# Patient Record
Sex: FEMALE | Race: WHITE | NOT HISPANIC OR LATINO | Employment: OTHER | URBAN - NONMETROPOLITAN AREA
[De-identification: names, ages, dates, MRNs, and addresses within clinical notes are randomized per-mention and may not be internally consistent; named-entity substitution may affect disease eponyms.]

---

## 2017-02-17 ENCOUNTER — APPOINTMENT (OUTPATIENT)
Dept: GENERAL RADIOLOGY | Facility: HOSPITAL | Age: 82
End: 2017-02-17

## 2017-02-17 ENCOUNTER — APPOINTMENT (OUTPATIENT)
Dept: CT IMAGING | Facility: HOSPITAL | Age: 82
End: 2017-02-17

## 2017-02-17 ENCOUNTER — HOSPITAL ENCOUNTER (EMERGENCY)
Facility: HOSPITAL | Age: 82
Discharge: HOME | End: 2017-02-17
Attending: EMERGENCY MEDICINE | Admitting: EMERGENCY MEDICINE

## 2017-02-17 VITALS
OXYGEN SATURATION: 99 % | SYSTOLIC BLOOD PRESSURE: 123 MMHG | BODY MASS INDEX: 25.52 KG/M2 | HEIGHT: 63 IN | WEIGHT: 144 LBS | RESPIRATION RATE: 17 BRPM | DIASTOLIC BLOOD PRESSURE: 47 MMHG | TEMPERATURE: 97.9 F | HEART RATE: 60 BPM

## 2017-02-17 DIAGNOSIS — R55 VASO-VAGAL REACTION: Primary | ICD-10-CM

## 2017-02-17 LAB
ALBUMIN SERPL-MCNC: 4.1 G/DL (ref 3.5–5)
ALBUMIN/GLOB SERPL: 1.6 G/DL (ref 1.1–2.5)
ALP SERPL-CCNC: 71 U/L (ref 24–120)
ALT SERPL W P-5'-P-CCNC: 34 U/L (ref 0–54)
ANION GAP SERPL CALCULATED.3IONS-SCNC: 10 MMOL/L (ref 4–13)
AST SERPL-CCNC: 31 U/L (ref 7–45)
BASOPHILS # BLD AUTO: 0.02 10*3/MM3 (ref 0–0.2)
BASOPHILS NFR BLD AUTO: 0.3 % (ref 0–2)
BILIRUB SERPL-MCNC: 0.8 MG/DL (ref 0.1–1)
BUN BLD-MCNC: 19 MG/DL (ref 5–21)
BUN/CREAT SERPL: 23.8 (ref 7–25)
CALCIUM SPEC-SCNC: 9.6 MG/DL (ref 8.4–10.4)
CHLORIDE SERPL-SCNC: 104 MMOL/L (ref 98–110)
CO2 SERPL-SCNC: 27 MMOL/L (ref 24–31)
CREAT BLD-MCNC: 0.8 MG/DL (ref 0.5–1.4)
DEPRECATED RDW RBC AUTO: 46.1 FL (ref 40–54)
EOSINOPHIL # BLD AUTO: 0.03 10*3/MM3 (ref 0–0.7)
EOSINOPHIL NFR BLD AUTO: 0.5 % (ref 0–4)
ERYTHROCYTE [DISTWIDTH] IN BLOOD BY AUTOMATED COUNT: 13.8 % (ref 12–15)
GFR SERPL CREATININE-BSD FRML MDRD: 67 ML/MIN/1.73
GLOBULIN UR ELPH-MCNC: 2.5 GM/DL
GLUCOSE BLD-MCNC: 133 MG/DL (ref 70–100)
GLUCOSE BLDC GLUCOMTR-MCNC: 135 MG/DL (ref 70–130)
HCT VFR BLD AUTO: 37.5 % (ref 37–47)
HGB BLD-MCNC: 12 G/DL (ref 12–16)
IMM GRANULOCYTES # BLD: 0.02 10*3/MM3 (ref 0–0.03)
IMM GRANULOCYTES NFR BLD: 0.3 % (ref 0–5)
LYMPHOCYTES # BLD AUTO: 1.67 10*3/MM3 (ref 0.72–4.86)
LYMPHOCYTES NFR BLD AUTO: 25.8 % (ref 15–45)
MCH RBC QN AUTO: 29.4 PG (ref 28–32)
MCHC RBC AUTO-ENTMCNC: 32 G/DL (ref 33–36)
MCV RBC AUTO: 91.9 FL (ref 82–98)
MONOCYTES # BLD AUTO: 0.34 10*3/MM3 (ref 0.19–1.3)
MONOCYTES NFR BLD AUTO: 5.3 % (ref 4–12)
NEUTROPHILS # BLD AUTO: 4.39 10*3/MM3 (ref 1.87–8.4)
NEUTROPHILS NFR BLD AUTO: 67.8 % (ref 39–78)
PLATELET # BLD AUTO: 274 10*3/MM3 (ref 130–400)
PMV BLD AUTO: 9.9 FL (ref 6–12)
POTASSIUM BLD-SCNC: 4.5 MMOL/L (ref 3.5–5.3)
PROT SERPL-MCNC: 6.6 G/DL (ref 6.3–8.7)
RBC # BLD AUTO: 4.08 10*6/MM3 (ref 4.2–5.4)
SODIUM BLD-SCNC: 141 MMOL/L (ref 135–145)
TROPONIN I SERPL-MCNC: 0 NG/ML (ref 0–0.07)
WBC NRBC COR # BLD: 6.47 10*3/MM3 (ref 4.8–10.8)

## 2017-02-17 PROCEDURE — 71010 HC CHEST PA OR AP: CPT

## 2017-02-17 PROCEDURE — 96360 HYDRATION IV INFUSION INIT: CPT

## 2017-02-17 PROCEDURE — 82962 GLUCOSE BLOOD TEST: CPT

## 2017-02-17 PROCEDURE — 70450 CT HEAD/BRAIN W/O DYE: CPT

## 2017-02-17 PROCEDURE — 99283 EMERGENCY DEPT VISIT LOW MDM: CPT

## 2017-02-17 PROCEDURE — 80053 COMPREHEN METABOLIC PANEL: CPT | Performed by: EMERGENCY MEDICINE

## 2017-02-17 PROCEDURE — 84484 ASSAY OF TROPONIN QUANT: CPT

## 2017-02-17 PROCEDURE — 93005 ELECTROCARDIOGRAM TRACING: CPT | Performed by: EMERGENCY MEDICINE

## 2017-02-17 PROCEDURE — 85025 COMPLETE CBC W/AUTO DIFF WBC: CPT | Performed by: EMERGENCY MEDICINE

## 2017-02-17 PROCEDURE — 96361 HYDRATE IV INFUSION ADD-ON: CPT

## 2017-02-17 PROCEDURE — 93010 ELECTROCARDIOGRAM REPORT: CPT | Performed by: INTERNAL MEDICINE

## 2017-02-17 RX ADMIN — SODIUM CHLORIDE 500 ML: 0.9 INJECTION, SOLUTION INTRAVENOUS at 08:26

## 2017-02-17 NOTE — ED PROVIDER NOTES
Subjective   Patient is a 89 y.o. female presenting with syncope.   Syncope   Episode history:  Single  Most recent episode:  Today  Timing:  Constant  Progression:  Resolved  Chronicity:  New  Context: not blood draw, not bowel movement, not dehydration, not exertion, not inactivity, not medication change, not with normal activity, not sight of blood, not sitting down, not standing up and not urination    Witnessed: yes    Worsened by:  Nothing  Associated symptoms: no anxiety, no chest pain, no difficulty breathing, no dizziness, no fever, no headaches, no malaise/fatigue, no nausea, no palpitations, no recent fall, no recent injury, no recent surgery, no rectal bleeding, no seizures, no shortness of breath, no visual change, no vomiting and no weakness    Risk factors: no congenital heart disease        Review of Systems   Constitutional: Negative.  Negative for fever and malaise/fatigue.   HENT: Negative.    Eyes: Negative.    Respiratory: Negative.  Negative for shortness of breath.    Cardiovascular: Positive for syncope. Negative for chest pain and palpitations.   Gastrointestinal: Negative.  Negative for nausea and vomiting.   Musculoskeletal: Negative.  Negative for back pain and neck pain.   Skin: Negative.    Neurological: Negative for dizziness, seizures, weakness and headaches.   All other systems reviewed and are negative.      Past Medical History   Diagnosis Date   • Bradycardia    • CAD in native artery    • Cardiac device in situ 03/29/2011     RCA stent   • Chest pain    • Diabetes mellitus type 2, uncomplicated    • GERD (gastroesophageal reflux disease)    • Hard of hearing    • History of myocardial infarction 03/29/2011     EF 45% 2/11 cath   • Hyperlipemia, mixed    • Hypertension, benign    • Palpitation    • SSS (sick sinus syndrome)    • Status cardiac pacemaker    • Syncope        No Known Allergies    Past Surgical History   Procedure Laterality Date   • Hysterectomy       total   •  Pacemaker implantation     • Bladder suspension       with hysterectomy       Family History   Problem Relation Age of Onset   • Coronary artery disease Mother        Social History     Social History   • Marital status:      Spouse name: N/A   • Number of children: N/A   • Years of education: N/A     Social History Main Topics   • Smoking status: Never Smoker   • Smokeless tobacco: None   • Alcohol use No   • Drug use: None   • Sexual activity: Not Asked     Other Topics Concern   • None     Social History Narrative           Objective   Physical Exam   Constitutional: She is oriented to person, place, and time. She appears well-developed and well-nourished.   HENT:   Head: Normocephalic and atraumatic.   Eyes: Conjunctivae and EOM are normal. Pupils are equal, round, and reactive to light.   Neck: Normal range of motion. Neck supple.   Cardiovascular: Normal rate, regular rhythm, normal heart sounds and intact distal pulses.    Pulmonary/Chest: Effort normal and breath sounds normal.   Abdominal: Soft. Bowel sounds are normal.   Musculoskeletal: Normal range of motion.   Neurological: She is alert and oriented to person, place, and time. She has normal reflexes.   Skin: Skin is warm and dry.   Psychiatric: She has a normal mood and affect.   Nursing note and vitals reviewed.      Procedures         ED Course  ED Course   Comment By Time   nsr Robinson Michelle MD 02/17 0802   Case discussed extensively with the patient Robinson Michelle MD 02/17 1002    The patient's symptoms are now better.  Patient is not having pain.  No chest pain, difficulty breathing, nausea, vomiting or palpitations.  No anxiety or dizziness.  Vital signs have been reviewed and appear to be correct.  Physical exam findings are improved.  Alert.  Oriented X3 .  No acute distress.  Breath sounds normal.  No respiratory distress, decreased breath sounds or wheezes.  Normal heart rate and rhythm.  Heart sounds normal.  .  Abdomen soft and  nontender.  No abdominal tenderness or guarding or rebound tenderness.  Skin warm and dry.  No cyanosis or diaphoresis.  Oriented X 3.  Not anxious.  No alteration in mental status or weakness.         Robinson Michelle MD 02/17 1002   Neck: The patient be admitted to the hospital but they want to go home we will discharge home with a follow-up Holter and try and 2-D echo Robinson Michelle MD 02/17 1002                  [facility]    Final diagnoses:   Vaso-vagal reaction            Robinson Michelle MD  02/18/17 2025

## 2017-02-17 NOTE — ED NOTES
"Patient c/o of near syncope. Patient states she was standing in the kitchen this morning cooking breakfast and a \"strange sensation\" came over her, and she had to sit down. Patient denies pain. Patient denies shortness of breath. Patient denies dizziness. Patient states reports episode lasted about fifteen minutes. Patient states she has not yet eaten this morning. Patient states she has not taken any medications this morning. Patient states she was treated for a double ear infection last week. Patient also report hypertension at home.     Jessie Mcgee RN  02/17/17 0736       Jessie Mcgee RN  02/17/17 0737    "

## 2017-03-08 ENCOUNTER — OFFICE VISIT (OUTPATIENT)
Dept: CARDIOLOGY | Facility: CLINIC | Age: 82
End: 2017-03-08

## 2017-03-08 ENCOUNTER — CLINICAL SUPPORT (OUTPATIENT)
Dept: CARDIOLOGY | Facility: CLINIC | Age: 82
End: 2017-03-08

## 2017-03-08 VITALS
BODY MASS INDEX: 23.82 KG/M2 | HEIGHT: 65 IN | DIASTOLIC BLOOD PRESSURE: 65 MMHG | SYSTOLIC BLOOD PRESSURE: 140 MMHG | WEIGHT: 143 LBS | HEART RATE: 82 BPM

## 2017-03-08 DIAGNOSIS — I49.5 SSS (SICK SINUS SYNDROME) (HCC): ICD-10-CM

## 2017-03-08 DIAGNOSIS — I25.10 CORONARY ARTERY DISEASE INVOLVING NATIVE CORONARY ARTERY OF NATIVE HEART WITHOUT ANGINA PECTORIS: Primary | ICD-10-CM

## 2017-03-08 DIAGNOSIS — Z95.0 CARDIAC PACEMAKER IN SITU: Primary | ICD-10-CM

## 2017-03-08 DIAGNOSIS — R07.2 PRECORDIAL PAIN: ICD-10-CM

## 2017-03-08 DIAGNOSIS — K21.9 GASTROESOPHAGEAL REFLUX DISEASE WITHOUT ESOPHAGITIS: ICD-10-CM

## 2017-03-08 DIAGNOSIS — E78.2 MIXED HYPERLIPIDEMIA: ICD-10-CM

## 2017-03-08 PROCEDURE — 93280 PM DEVICE PROGR EVAL DUAL: CPT | Performed by: INTERNAL MEDICINE

## 2017-03-08 PROCEDURE — 93000 ELECTROCARDIOGRAM COMPLETE: CPT | Performed by: INTERNAL MEDICINE

## 2017-03-08 PROCEDURE — 99214 OFFICE O/P EST MOD 30 MIN: CPT | Performed by: INTERNAL MEDICINE

## 2017-03-08 NOTE — PROGRESS NOTES
Dual Chamber Pacemaker Evaluation Report  In office    March 8, 2017    Primary Cardiologist: Kaylen  : [employer] Model: Revo MRI  Implant date: January 21, 2013    Reason for evaluation:routine  Indication for pacemaker: sick sinus syndrome    Measurements  Atrial sensing - P wave: 5.1 mV  Atrial threshold: 0.5V@ 0.4ms  Atrial lead impedance: 456 ohms  Ventricular sensing - R wave: 10.5 mV  Ventricular threshold: 1 V @ 0.4 ms  Ventricular lead impedance:   440 ohms     Diagnostic Data  Atrial paced: 27.9 %  Ventricular paced: <0.2 %  Other: 2 SVT/ a-flutter episodes- both 8-10 seconds each, max v rate 182 bpm, pt believes this may have been around the time she felt the symptoms she mentioned to Dr. Abdullahi   Notified Dr. Abdullahi of results.  Battery status: satisfactory  3 V      Final Parameters  Mode:  AAIR+  Lower rate: 60 bpm   Upper rate: 130 bpm  AV Delay: paced- 180 ms  Sensed-150 ms  Atrial - Amplitude: 1.5 V   Pulse width: 0.4 ms   Sensitivity: 0.3 mV     Ventricular - Amplitude: 2 V  Pulse width: 0.4 ms  Sensitivity: 0.9 mV    Changes made: changed atrial output to 1.5V  Conclusions: normal pacemaker function and stable pacing and sensing thresholds    Follow up: 6 months

## 2017-03-08 NOTE — PROGRESS NOTES
Emiliano Recinos  [id number]  2/25/1923  89 y.o.  female    Referring Provider: Florentin Costello MD    Reason for Follow-up Visit: Dizziness Recent increasing dyspnea on exzertion    Overall doing well  Intermittent  chest pain mostly epigastric  Occurs mostly with exertion  Moderateshortness of breath  Compliant with medications    History of present illness:  Emiliano Recinos is a 89 y.o. yo female with history of dizziness who presents today for   Chief Complaint   Patient presents with   • Coronary Artery Disease     6 mon fu    • Dizziness   .    History  Past Medical History   Diagnosis Date   • Bradycardia    • CAD in native artery    • Cardiac device in situ 03/29/2011     RCA stent   • Chest pain    • Diabetes mellitus type 2, uncomplicated    • GERD (gastroesophageal reflux disease)    • Hard of hearing    • History of myocardial infarction 03/29/2011     EF 45% 2/11 cath   • Hyperlipemia, mixed    • Hypertension, benign    • Palpitation    • SSS (sick sinus syndrome)    • Status cardiac pacemaker    • Syncope    ,   Past Surgical History   Procedure Laterality Date   • Hysterectomy       total   • Pacemaker implantation     • Bladder suspension       with hysterectomy   ,   Family History   Problem Relation Age of Onset   • Coronary artery disease Mother    • Heart attack Mother    ,   Social History   Substance Use Topics   • Smoking status: Never Smoker   • Smokeless tobacco: Never Used   • Alcohol use No   ,     Medications  Current Outpatient Prescriptions   Medication Sig Dispense Refill   • aspirin 81 MG tablet Take 81 mg by mouth daily.     • clopidogrel (PLAVIX) 75 MG tablet Take 75 mg by mouth daily.     • furosemide (LASIX) 20 MG tablet Take  by mouth as needed.     • lisinopril (PRINIVIL,ZESTRIL) 5 MG tablet Take 2.5 mg by mouth daily.     • MULTIPLE VITAMIN PO Take  by mouth.       • nitroglycerin (NITROSTAT) 0.4 MG SL tablet Place 0.4 mg under the tongue daily.     • omeprazole (PriLOSEC)  "20 MG capsule Take 20 mg by mouth daily.     • rosuvastatin (CRESTOR) 10 MG tablet Take 10 mg by mouth. Monday, Wed, Fri       No current facility-administered medications for this visit.        Allergies:  Review of patient's allergies indicates no known allergies.    Review of Systems  Review of Systems   Constitution: Negative.   HENT: Negative.    Eyes: Negative.    Cardiovascular: Positive for chest pain, dyspnea on exertion and near-syncope. Negative for claudication, cyanosis, irregular heartbeat, leg swelling, orthopnea, palpitations, paroxysmal nocturnal dyspnea and syncope.   Respiratory: Negative.    Endocrine: Negative.    Hematologic/Lymphatic: Negative.    Skin: Negative.    Gastrointestinal: Negative for anorexia.   Genitourinary: Negative.    Neurological: Negative.    Psychiatric/Behavioral: Negative.        Objective     Physical Exam:  Visit Vitals   • /65   • Pulse 82   • Ht 65\" (165.1 cm)   • Wt 143 lb (64.9 kg)   • BMI 23.8 kg/m2     Physical Exam   Constitutional: She appears well-developed.   HENT:   Head: Normocephalic.   Neck: Normal carotid pulses and no JVD present. No tracheal tenderness present. Carotid bruit is not present. No tracheal deviation and no edema present.   Cardiovascular: Regular rhythm and normal pulses.    Murmur heard.   Systolic murmur is present with a grade of 2/6   Pulmonary/Chest: Effort normal. No stridor.   Abdominal: Soft.   Neurological: She is alert. She has normal strength. No cranial nerve deficit or sensory deficit.   Skin: Skin is warm.   Psychiatric: She has a normal mood and affect. Her speech is normal and behavior is normal.       Results Review:       ECG 12 Lead  Date/Time: 3/8/2017 10:37 AM  Performed by: JENNIFER RALPH  Authorized by: JENNIFER RALPH   Comparison: compared with previous ECG from 2/17/2017  Similar to previous ECG  Rhythm: sinus rhythm  Rate: normal  T Waves: T waves normal  QRS axis: normal  Clinical impression: normal " ECG            Assessment/Plan   Patient Active Problem List   Diagnosis   • CAD (coronary artery disease)   • SSS (sick sinus syndrome)   • GERD (gastroesophageal reflux disease)   • HLD (hyperlipidemia)       No palpitations. No significant pedal edema. Compliant with medications and diet. Latest labs and medications reviewed.    Plan:  Will recheck pacer as recent dizziness  Close follow up with you as scheduled.  Intensive factor modifications.  See order list.    Counseled regarding disease appropriate diet, fluid, caffeine, stimulants and sodium intake as well as importance of compliance to diet, exercise and regular follow up.  Avoid NSAIDS and COX2 inhibitors. Use Acetaminophen PRN.    Return in about 4 weeks (around 4/5/2017).

## 2017-03-13 NOTE — PROGRESS NOTES
I have reviewed the notes, assessments, and/or procedures performed by Griselda Garsia, I concur with her documentation of  Emiliano Recinos.

## 2017-03-29 ENCOUNTER — HOSPITAL ENCOUNTER (OUTPATIENT)
Dept: CARDIOLOGY | Facility: HOSPITAL | Age: 82
Discharge: HOME | End: 2017-03-29
Attending: INTERNAL MEDICINE | Admitting: INTERNAL MEDICINE

## 2017-03-29 ENCOUNTER — HOSPITAL ENCOUNTER (OUTPATIENT)
Dept: CARDIOLOGY | Facility: HOSPITAL | Age: 82
Discharge: HOME | End: 2017-03-29
Attending: INTERNAL MEDICINE

## 2017-03-29 VITALS
HEIGHT: 65 IN | SYSTOLIC BLOOD PRESSURE: 152 MMHG | BODY MASS INDEX: 23.82 KG/M2 | WEIGHT: 143 LBS | DIASTOLIC BLOOD PRESSURE: 64 MMHG

## 2017-03-29 VITALS — SYSTOLIC BLOOD PRESSURE: 117 MMHG | DIASTOLIC BLOOD PRESSURE: 63 MMHG | HEART RATE: 61 BPM

## 2017-03-29 DIAGNOSIS — R07.2 PRECORDIAL PAIN: ICD-10-CM

## 2017-03-29 LAB
BH CV NUCLEAR PRIOR STUDY: 2
BH CV STRESS BP STAGE 1: NORMAL
BH CV STRESS COMMENTS STAGE 1: NORMAL
BH CV STRESS DOSE REGADENOSON STAGE 1: 0.4
BH CV STRESS DURATION MIN STAGE 1: 0
BH CV STRESS DURATION SEC STAGE 1: 10
BH CV STRESS HR STAGE 1: 60
BH CV STRESS PROTOCOL 1: NORMAL
BH CV STRESS RECOVERY BP: NORMAL MMHG
BH CV STRESS RECOVERY HR: 76 BPM
BH CV STRESS STAGE 1: 1
MAXIMAL PREDICTED HEART RATE: 126 BPM
PERCENT MAX PREDICTED HR: 47.62 %
STRESS BASELINE BP: NORMAL MMHG
STRESS BASELINE HR: 61 BPM
STRESS PERCENT HR: 56 %
STRESS POST EXERCISE DUR SEC: 10 SEC
STRESS POST PEAK BP: NORMAL MMHG
STRESS POST PEAK HR: 60 BPM
STRESS TARGET HR: 107 BPM

## 2017-03-29 PROCEDURE — A9500 TC99M SESTAMIBI: HCPCS | Performed by: INTERNAL MEDICINE

## 2017-03-29 PROCEDURE — 93018 CV STRESS TEST I&R ONLY: CPT | Performed by: INTERNAL MEDICINE

## 2017-03-29 PROCEDURE — 93306 TTE W/DOPPLER COMPLETE: CPT

## 2017-03-29 PROCEDURE — 25010000002 REGADENOSON 0.4 MG/5ML SOLUTION: Performed by: INTERNAL MEDICINE

## 2017-03-29 PROCEDURE — 78452 HT MUSCLE IMAGE SPECT MULT: CPT | Performed by: INTERNAL MEDICINE

## 2017-03-29 PROCEDURE — 0 TECHNETIUM SESTAMIBI: Performed by: INTERNAL MEDICINE

## 2017-03-29 PROCEDURE — 78452 HT MUSCLE IMAGE SPECT MULT: CPT

## 2017-03-29 PROCEDURE — 93017 CV STRESS TEST TRACING ONLY: CPT

## 2017-03-29 PROCEDURE — 93306 TTE W/DOPPLER COMPLETE: CPT | Performed by: INTERNAL MEDICINE

## 2017-03-29 RX ADMIN — Medication 1 DOSE: at 09:06

## 2017-03-29 RX ADMIN — REGADENOSON 0.4 MG: 0.08 INJECTION, SOLUTION INTRAVENOUS at 08:49

## 2017-03-29 RX ADMIN — Medication 1 DOSE: at 07:28

## 2017-04-01 LAB
BH CV ECHO MEAS - AO MAX PG (FULL): 5 MMHG
BH CV ECHO MEAS - AO MAX PG: 9.1 MMHG
BH CV ECHO MEAS - AO MEAN PG (FULL): 3 MMHG
BH CV ECHO MEAS - AO MEAN PG: 6 MMHG
BH CV ECHO MEAS - AO ROOT AREA (BSA CORRECTED): 1.7
BH CV ECHO MEAS - AO ROOT AREA: 6.6 CM^2
BH CV ECHO MEAS - AO ROOT DIAM: 2.9 CM
BH CV ECHO MEAS - AO V2 MAX: 151 CM/SEC
BH CV ECHO MEAS - AO V2 MEAN: 118 CM/SEC
BH CV ECHO MEAS - AO V2 VTI: 46.9 CM
BH CV ECHO MEAS - AVA(I,A): 1.7 CM^2
BH CV ECHO MEAS - AVA(I,D): 1.7 CM^2
BH CV ECHO MEAS - AVA(V,A): 1.9 CM^2
BH CV ECHO MEAS - AVA(V,D): 1.9 CM^2
BH CV ECHO MEAS - BSA(HAYCOCK): 1.7 M^2
BH CV ECHO MEAS - BSA: 1.7 M^2
BH CV ECHO MEAS - BZI_BMI: 23.8 KILOGRAMS/M^2
BH CV ECHO MEAS - BZI_METRIC_HEIGHT: 165.1 CM
BH CV ECHO MEAS - BZI_METRIC_WEIGHT: 64.9 KG
BH CV ECHO MEAS - CONTRAST EF 4CH: 59.5 ML/M^2
BH CV ECHO MEAS - EDV(CUBED): 42.9 ML
BH CV ECHO MEAS - EDV(MOD-SP4): 52.8 ML
BH CV ECHO MEAS - EDV(TEICH): 50.9 ML
BH CV ECHO MEAS - EF(CUBED): 67.8 %
BH CV ECHO MEAS - EF(MOD-SP4): 59.5 %
BH CV ECHO MEAS - EF(TEICH): 60.4 %
BH CV ECHO MEAS - ESV(CUBED): 13.8 ML
BH CV ECHO MEAS - ESV(MOD-SP4): 21.4 ML
BH CV ECHO MEAS - ESV(TEICH): 20.2 ML
BH CV ECHO MEAS - FS: 31.4 %
BH CV ECHO MEAS - IVS/LVPW: 0.94
BH CV ECHO MEAS - IVSD: 1.6 CM
BH CV ECHO MEAS - LA DIMENSION: 3.7 CM
BH CV ECHO MEAS - LA/AO: 1.3
BH CV ECHO MEAS - LV DIASTOLIC VOL/BSA (35-75): 30.8 ML/M^2
BH CV ECHO MEAS - LV MASS(C)D: 226.5 GRAMS
BH CV ECHO MEAS - LV MASS(C)DI: 132.1 GRAMS/M^2
BH CV ECHO MEAS - LV MAX PG: 4.1 MMHG
BH CV ECHO MEAS - LV MEAN PG: 3 MMHG
BH CV ECHO MEAS - LV SYSTOLIC VOL/BSA (12-30): 12.5 ML/M^2
BH CV ECHO MEAS - LV V1 MAX: 101 CM/SEC
BH CV ECHO MEAS - LV V1 MEAN: 74.3 CM/SEC
BH CV ECHO MEAS - LV V1 VTI: 27.8 CM
BH CV ECHO MEAS - LVIDD: 3.5 CM
BH CV ECHO MEAS - LVIDS: 2.4 CM
BH CV ECHO MEAS - LVLD AP4: 6.7 CM
BH CV ECHO MEAS - LVLS AP4: 6.3 CM
BH CV ECHO MEAS - LVOT AREA (M): 2.8 CM^2
BH CV ECHO MEAS - LVOT AREA: 2.8 CM^2
BH CV ECHO MEAS - LVOT DIAM: 1.9 CM
BH CV ECHO MEAS - LVPWD: 1.7 CM
BH CV ECHO MEAS - MV A MAX VEL: 140 CM/SEC
BH CV ECHO MEAS - MV DEC TIME: 0.32 SEC
BH CV ECHO MEAS - MV E MAX VEL: 97 CM/SEC
BH CV ECHO MEAS - MV E/A: 0.69
BH CV ECHO MEAS - RAP SYSTOLE: 5 MMHG
BH CV ECHO MEAS - RVSP: 33.1 MMHG
BH CV ECHO MEAS - SI(AO): 180.6 ML/M^2
BH CV ECHO MEAS - SI(CUBED): 16.9 ML/M^2
BH CV ECHO MEAS - SI(LVOT): 46 ML/M^2
BH CV ECHO MEAS - SI(MOD-SP4): 18.3 ML/M^2
BH CV ECHO MEAS - SI(TEICH): 17.9 ML/M^2
BH CV ECHO MEAS - SV(AO): 309.8 ML
BH CV ECHO MEAS - SV(CUBED): 29.1 ML
BH CV ECHO MEAS - SV(LVOT): 78.8 ML
BH CV ECHO MEAS - SV(MOD-SP4): 31.4 ML
BH CV ECHO MEAS - SV(TEICH): 30.7 ML
BH CV ECHO MEAS - TR MAX VEL: 265 CM/SEC
E/E' RATIO: 24.1
LEFT ATRIUM VOLUME INDEX: 28.8 ML/M2
LEFT ATRIUM VOLUME: 49.5 CM3
LV EF 2D ECHO EST: 65 %

## 2017-04-11 ENCOUNTER — OFFICE VISIT (OUTPATIENT)
Dept: CARDIOLOGY | Facility: CLINIC | Age: 82
End: 2017-04-11

## 2017-04-11 VITALS
OXYGEN SATURATION: 98 % | SYSTOLIC BLOOD PRESSURE: 130 MMHG | DIASTOLIC BLOOD PRESSURE: 70 MMHG | BODY MASS INDEX: 23.8 KG/M2 | HEART RATE: 63 BPM | WEIGHT: 143 LBS

## 2017-04-11 DIAGNOSIS — K21.9 GASTROESOPHAGEAL REFLUX DISEASE WITHOUT ESOPHAGITIS: ICD-10-CM

## 2017-04-11 DIAGNOSIS — E78.2 MIXED HYPERLIPIDEMIA: ICD-10-CM

## 2017-04-11 DIAGNOSIS — I25.10 CORONARY ARTERY DISEASE INVOLVING NATIVE CORONARY ARTERY OF NATIVE HEART WITHOUT ANGINA PECTORIS: Primary | ICD-10-CM

## 2017-04-11 DIAGNOSIS — I49.5 SSS (SICK SINUS SYNDROME) (HCC): ICD-10-CM

## 2017-04-11 DIAGNOSIS — I25.10 CAD IN NATIVE ARTERY: ICD-10-CM

## 2017-04-11 PROCEDURE — 99214 OFFICE O/P EST MOD 30 MIN: CPT | Performed by: INTERNAL MEDICINE

## 2017-04-11 NOTE — PROGRESS NOTES
Emiliano Recinos  [id number]  2/25/1923  89 y.o.  female    Referring Provider: Florentin Costello MD    Reason for Follow-up Visit: Dizziness Recent increasing dyspnea on exzertion    Overall doing well  Intermittent  chest pain mostly epigastric  Occurs mostly with exertion  Moderateshortness of breath  Compliant with medications    History of present illness:  Emiliano Recinos is a 89 y.o. yo female with history of dizziness who presents today for   Chief Complaint   Patient presents with   • Coronary Artery Disease     1 mo f/u - test results   .    History  Past Medical History:   Diagnosis Date   • Bradycardia    • CAD in native artery    • Cardiac device in situ 03/29/2011    RCA stent   • Chest pain    • Diabetes mellitus type 2, uncomplicated    • GERD (gastroesophageal reflux disease)    • Hard of hearing    • History of myocardial infarction 03/29/2011    EF 45% 2/11 cath   • Hyperlipemia, mixed    • Hypertension, benign    • Palpitation    • SSS (sick sinus syndrome)    • Status cardiac pacemaker    • Syncope    ,   Past Surgical History:   Procedure Laterality Date   • BLADDER SUSPENSION      with hysterectomy   • CARDIAC CATHETERIZATION     • CORONARY STENT PLACEMENT      x 1   • HYSTERECTOMY      total   • INSERT / REPLACE / REMOVE PACEMAKER     • PACEMAKER IMPLANTATION     ,   Family History   Problem Relation Age of Onset   • Coronary artery disease Mother    • Heart attack Mother    ,   Social History   Substance Use Topics   • Smoking status: Never Smoker   • Smokeless tobacco: Never Used   • Alcohol use No   ,     Medications  Current Outpatient Prescriptions   Medication Sig Dispense Refill   • aspirin 81 MG tablet Take 81 mg by mouth daily.     • clopidogrel (PLAVIX) 75 MG tablet Take 75 mg by mouth daily.     • furosemide (LASIX) 20 MG tablet Take  by mouth as needed.     • lisinopril (PRINIVIL,ZESTRIL) 5 MG tablet Take 2.5 mg by mouth daily.     • nitroglycerin (NITROSTAT) 0.4 MG SL tablet Place  0.4 mg under the tongue daily.     • omeprazole (PriLOSEC) 20 MG capsule Take 20 mg by mouth daily.     • Polyethylene Glycol 3350 (MIRALAX PO) Take  by mouth.     • rosuvastatin (CRESTOR) 10 MG tablet Take 10 mg by mouth. Monday, Wed, Fri       No current facility-administered medications for this visit.        Allergies:  Metoclopramide    Review of Systems  Review of Systems   Constitution: Negative.   HENT: Negative.    Eyes: Negative.    Cardiovascular: Positive for chest pain, dyspnea on exertion and near-syncope. Negative for claudication, cyanosis, irregular heartbeat, leg swelling, orthopnea, palpitations, paroxysmal nocturnal dyspnea and syncope.   Respiratory: Negative.    Endocrine: Negative.    Hematologic/Lymphatic: Negative.    Skin: Negative.    Gastrointestinal: Negative for anorexia.   Genitourinary: Negative.    Neurological: Negative.    Psychiatric/Behavioral: Negative.        Objective     Physical Exam:  /70  Pulse 63  Wt 143 lb (64.9 kg)  SpO2 98%  BMI 23.8 kg/m2  Physical Exam   Constitutional: She appears well-developed.   HENT:   Head: Normocephalic.   Neck: Normal carotid pulses and no JVD present. No tracheal tenderness present. Carotid bruit is not present. No tracheal deviation and no edema present.   Cardiovascular: Regular rhythm and normal pulses.    Murmur heard.   Systolic murmur is present with a grade of 2/6   Pulmonary/Chest: Effort normal. No stridor.   Abdominal: Soft.   Neurological: She is alert. She has normal strength. No cranial nerve deficit or sensory deficit.   Skin: Skin is warm.   Psychiatric: She has a normal mood and affect. Her speech is normal and behavior is normal.       Results Review:     Procedures    Assessment/Plan   Patient Active Problem List   Diagnosis   • CAD (coronary artery disease)   • SSS (sick sinus syndrome)   • GERD (gastroesophageal reflux disease)   • HLD (hyperlipidemia)   • CAD in native artery       Lexiscan stress test low risk  of ischemia  Echo moderate LVH normal LVEF    No palpitations. No significant pedal edema. Compliant with medications and diet. Latest labs and medications reviewed.    Plan:    Close follow up with you as scheduled.  Intensive factor modifications.  See order list.    Counseled regarding disease appropriate diet, fluid, caffeine, stimulants and sodium intake as well as importance of compliance to diet, exercise and regular follow up.  Avoid NSAIDS and COX2 inhibitors. Use Acetaminophen PRN.    Return in about 9 months (around 1/11/2018).

## 2017-09-07 ENCOUNTER — CLINICAL SUPPORT (OUTPATIENT)
Dept: CARDIOLOGY | Facility: CLINIC | Age: 82
End: 2017-09-07

## 2017-09-07 DIAGNOSIS — I49.5 SSS (SICK SINUS SYNDROME) (HCC): Primary | ICD-10-CM

## 2017-09-07 PROCEDURE — 93288 INTERROG EVL PM/LDLS PM IP: CPT | Performed by: INTERNAL MEDICINE

## 2017-09-07 NOTE — PROGRESS NOTES
Dual Chamber Pacemaker Evaluation Report  In Office     September 7, 2017    Primary Cardiologist: Kaylen  : [employer] Model: Revo MRI  Implant date: January 21,2013    Reason for evaluation: routine  Indication for pacemaker: sick sinus syndrome    Measurements  Atrial sensing - P wave: 2.6 mV  Atrial threshold: 0.5V@ 0.40ms  Atrial lead impedance: 456 ohms  Ventricular sensing - R wave: 6.4 mV  Ventricular threshold: 0.5 V @ 0.40 ms  Ventricular lead impedance:   424 ohms     Diagnostic Data  Atrial paced: 18.4 %  Ventricular paced: <0.1 %  Other: None   Battery status: satisfactory   3.0V      Final Parameters  Mode:  AAIR+  Lower rate: 60 bpm   Upper rate: 130 bpm  AV Delay: paced- 180 ms  Sensed-150 ms  Atrial - Amplitude: 1.5 V   Pulse width: 0.40 ms   Sensitivity: 0.3 mV     Ventricular - Amplitude: 2.0 V  Pulse width: 0.40 ms  Sensitivity: 0.9 mV    Changes made: None  Conclusions: normal pacemaker function and stable pacing and sensing thresholds    Follow up: 6 month.     Interrogation done by company device rep.

## 2017-09-08 NOTE — PROGRESS NOTES
I have reviewed the notes, assessments, and/or procedures performed by Cat Tyler CMA , I concur with her  documentation of  Emiliano Recinos.

## 2018-01-10 ENCOUNTER — OFFICE VISIT (OUTPATIENT)
Dept: CARDIOLOGY | Facility: CLINIC | Age: 83
End: 2018-01-10

## 2018-01-10 VITALS
DIASTOLIC BLOOD PRESSURE: 58 MMHG | OXYGEN SATURATION: 98 % | WEIGHT: 148 LBS | HEART RATE: 69 BPM | SYSTOLIC BLOOD PRESSURE: 162 MMHG | BODY MASS INDEX: 24.66 KG/M2 | HEIGHT: 65 IN

## 2018-01-10 DIAGNOSIS — I25.10 CORONARY ARTERY DISEASE INVOLVING NATIVE CORONARY ARTERY OF NATIVE HEART WITHOUT ANGINA PECTORIS: Primary | ICD-10-CM

## 2018-01-10 DIAGNOSIS — K21.9 GASTROESOPHAGEAL REFLUX DISEASE, ESOPHAGITIS PRESENCE NOT SPECIFIED: ICD-10-CM

## 2018-01-10 DIAGNOSIS — I49.5 SSS (SICK SINUS SYNDROME) (HCC): ICD-10-CM

## 2018-01-10 DIAGNOSIS — I25.10 CAD IN NATIVE ARTERY: ICD-10-CM

## 2018-01-10 DIAGNOSIS — E78.2 MIXED HYPERLIPIDEMIA: ICD-10-CM

## 2018-01-10 PROCEDURE — 93000 ELECTROCARDIOGRAM COMPLETE: CPT | Performed by: INTERNAL MEDICINE

## 2018-01-10 PROCEDURE — 99214 OFFICE O/P EST MOD 30 MIN: CPT | Performed by: INTERNAL MEDICINE

## 2018-01-10 NOTE — PROGRESS NOTES
Emiliano Rceinos  [id number]  2/25/1923  89 y.o.  female    Referring Provider: Florentin Costello MD    Reason for Follow-up Visit:  Routine follow up.    Subjective    Overall feeling well   No chest pain No palpitations  No significant pedal edema  Compliant with medications and dietary advice  Decreased effort tolerance  Effort tolerance limited more by orthopedic rather than cardiac related issues.    No presyncope or syncope  Compliant with medications        History of present illness:  Emiliano Recinos is a 89 y.o. yo female with history of dizziness sick sinus syndrome s/p pacemaker who presents today for   Chief Complaint   Patient presents with   • Coronary Artery Disease     9 MON FU    .    History  Past Medical History:   Diagnosis Date   • Bradycardia    • CAD in native artery    • Cardiac device in situ 03/29/2011    RCA stent   • Chest pain    • Diabetes mellitus type 2, uncomplicated    • GERD (gastroesophageal reflux disease)    • Hard of hearing    • History of myocardial infarction 03/29/2011    EF 45% 2/11 cath   • Hyperlipemia, mixed    • Hypertension, benign    • Palpitation    • SSS (sick sinus syndrome)    • Status cardiac pacemaker    • Syncope    ,   Past Surgical History:   Procedure Laterality Date   • BLADDER SUSPENSION      with hysterectomy   • CARDIAC CATHETERIZATION     • CORONARY STENT PLACEMENT      x 1   • HYSTERECTOMY      total   • INSERT / REPLACE / REMOVE PACEMAKER     • PACEMAKER IMPLANTATION     ,   Family History   Problem Relation Age of Onset   • Coronary artery disease Mother    • Heart attack Mother    ,   Social History   Substance Use Topics   • Smoking status: Never Smoker   • Smokeless tobacco: Never Used   • Alcohol use No   ,     Medications  Current Outpatient Prescriptions   Medication Sig Dispense Refill   • aspirin 81 MG tablet Take 81 mg by mouth daily.     • clopidogrel (PLAVIX) 75 MG tablet Take 75 mg by mouth daily.     • omeprazole (PriLOSEC) 20 MG capsule  "Take 20 mg by mouth daily.     • furosemide (LASIX) 20 MG tablet Take  by mouth as needed.     • lisinopril (PRINIVIL,ZESTRIL) 5 MG tablet Take 2.5 mg by mouth daily.     • nitroglycerin (NITROSTAT) 0.4 MG SL tablet Place 0.4 mg under the tongue daily.     • Polyethylene Glycol 3350 (MIRALAX PO) Take  by mouth.     • rosuvastatin (CRESTOR) 10 MG tablet Take 10 mg by mouth. Monday, Wed, Fri       No current facility-administered medications for this visit.        Allergies:  Metoclopramide    Review of Systems  Review of Systems   Constitution: Negative.   HENT: Negative.    Eyes: Negative.    Cardiovascular: Negative for chest pain, claudication, cyanosis, dyspnea on exertion, irregular heartbeat, leg swelling, near-syncope, orthopnea, palpitations, paroxysmal nocturnal dyspnea and syncope.   Respiratory: Negative.    Endocrine: Negative.    Hematologic/Lymphatic: Negative.    Skin: Negative.    Musculoskeletal: Positive for arthritis and back pain.   Gastrointestinal: Negative for anorexia.   Genitourinary: Negative.    Neurological: Negative.    Psychiatric/Behavioral: Negative.        Objective     Physical Exam:  /58  Pulse 69  Ht 165.1 cm (65\")  Wt 67.1 kg (148 lb)  SpO2 98%  BMI 24.63 kg/m2  Physical Exam   Constitutional: She appears well-developed.   HENT:   Head: Normocephalic.   Neck: Normal carotid pulses and no JVD present. No tracheal tenderness present. Carotid bruit is not present. No tracheal deviation and no edema present.   Cardiovascular: Regular rhythm and normal pulses.    Murmur heard.   Systolic murmur is present with a grade of 2/6   Pulmonary/Chest: Effort normal. No stridor.   Abdominal: Soft.   Neurological: She is alert. She has normal strength. No cranial nerve deficit or sensory deficit.   Skin: Skin is warm.   Psychiatric: She has a normal mood and affect. Her speech is normal and behavior is normal.       Results Review:       ECG 12 Lead  Date/Time: 1/10/2018 8:29 " "AM  Performed by: JENNIFER RALPH  Authorized by: JENNIFER RALPH   Comparison: compared with previous ECG from 3/8/2017  Comparison to previous ECG: APCs demand atrial pacing  Rate: normal  Conduction: conduction normal  ST Segments: ST segments normal  T Waves: T waves normal  QRS axis: normal  Clinical impression: abnormal ECG            Assessment/Plan   Patient Active Problem List   Diagnosis   • CAD (coronary artery disease)   • SSS (sick sinus syndrome)   • GERD (gastroesophageal reflux disease)   • HLD (hyperlipidemia)   • CAD in native artery       Lexiscan stress test low risk of ischemia  Echo moderate LVH normal LVEF  BP well controlled at home.       No palpitations. No significant pedal edema. Compliant with medications and diet. Latest labs and medications reviewed.    Plan:    Low salt/ HTN/ Heart healthy carbohydrate restricted cardiac diet as applicable to this patient's current diagnoses.   This handout has relevant information regarding shopping for food, preparing meals, what to eat at restaurants, tracking of food intake, information regarding sodium intake and salt content, how to read food labels, knowing what to eat, tips reagarding physical activity, calorie count and calorie expenditure. What foods to avoid. Information regarding alcoholic drinks along with \"good\" and \"bad\" fats.  Relevant printed educational materials given pertinent to above diagnoses     No additional cardiac testing required at this point in time.  Monitor for any signs of bleeding including red or dark stools. Fall precautions.   Patient is asked to monitor BP at home or work, several times per month and return with written values at next office visit.  Monitor CBC, CMP and Lipid Panel by primary  Monitor cardiac rhythm device function regularly per established schedule or PRN   Close follow up with you as scheduled.  Intensive factor modifications.  See order list.    Counseled regarding disease appropriate diet, fluid, " caffeine, stimulants and sodium intake as well as importance of compliance to diet, exercise and regular follow up.  Avoid NSAIDS and COX2 inhibitors. Use Acetaminophen PRN.  Gave a copy of my notes and relevant tests/ prior ECG etc for the patient to review and follow specific advise and relevant findings if any, prognosis, along with my current and future plans.       Return in about 6 months (around 7/10/2018).

## 2018-03-08 ENCOUNTER — CLINICAL SUPPORT NO REQUIREMENTS (OUTPATIENT)
Dept: CARDIOLOGY | Facility: CLINIC | Age: 83
End: 2018-03-08

## 2018-03-08 DIAGNOSIS — I49.5 SSS (SICK SINUS SYNDROME) (HCC): ICD-10-CM

## 2018-03-08 DIAGNOSIS — Z95.0 PACEMAKER: Primary | ICD-10-CM

## 2018-03-08 PROCEDURE — 93288 INTERROG EVL PM/LDLS PM IP: CPT | Performed by: INTERNAL MEDICINE

## 2018-03-08 NOTE — PROGRESS NOTES
Dual Chamber Pacemaker Evaluation Report  IN OFFICE    March 8, 2018    Primary Cardiologist: Kaylen  : Medtronic Model: Revo MRI  Implant date: 01/21/2013    Reason for evaluation: routine  Indication for pacemaker: sick sinus syndrome    Measurements  Atrial sensing - P wave: 2.8 mV  Atrial threshold: 0.5V@ 0.4ms  Atrial lead impedance: 496 ohms  Ventricular sensing - R wave: 6.4 mV  Ventricular threshold: 1 V @ 0.4 ms  Ventricular lead impedance:   440 ohms     Diagnostic Data  Atrial paced: 14.9 %  Ventricular paced: <0.1 %  Other: No episodes noted. Meds include aspirin and plavix.  Battery status: satisfactory   2.99V      Final Parameters  Mode:  AAIR+  Lower rate: 60 bpm   Upper rate: 130 bpm  AV Delay: paced- 180 ms  Sensed-150 ms  Atrial - Amplitude: 1.5 V   Pulse width: 0.4 ms   Sensitivity: 0.3 mV     Ventricular - Amplitude: 2 V  Pulse width: 0.4 ms  Sensitivity: 0.9 mV    Changes made: No changes made  Conclusions: normal pacemaker function, stable pacing and sensing thresholds and adequate battery reserve    Follow up: 6 months

## 2018-03-10 NOTE — PROGRESS NOTES
I have reviewed the notes, assessments, and/or procedures performed by  Roselyn Álvarez RN, I concur with her  documentation of  Emiliano Recinos.

## 2018-04-02 ENCOUNTER — TELEPHONE (OUTPATIENT)
Dept: CARDIOLOGY | Facility: CLINIC | Age: 83
End: 2018-04-02

## 2018-04-02 NOTE — TELEPHONE ENCOUNTER
[facility]   IS REQUESTING CLEARANCE FOR RIGHT HIP INJECTION WITH DR MITTAL. THIS IS NOT SCHEDULED YET.     PT HAS CAD/SSS WITH PACEMAKER & WAS LAST STENTED 03/29/2011.     ON ASA AND PLAVIX  - LOV WITH YOU WAS 01/10/18     PLEASE ADVISE

## 2018-04-02 NOTE — TELEPHONE ENCOUNTER
OK to hold Plavix x 5 days prior to procedure and resume when possible when no bleeding risks after procedure. Do not  stop Aspirin.

## 2018-04-06 ENCOUNTER — TRANSCRIBE ORDERS (OUTPATIENT)
Dept: ADMINISTRATIVE | Facility: HOSPITAL | Age: 83
End: 2018-04-06

## 2018-04-06 DIAGNOSIS — M16.11 PRIMARY OSTEOARTHRITIS OF RIGHT HIP: Primary | ICD-10-CM

## 2018-04-25 ENCOUNTER — HOSPITAL ENCOUNTER (OUTPATIENT)
Dept: GENERAL RADIOLOGY | Facility: HOSPITAL | Age: 83
Discharge: HOME | End: 2018-04-25
Admitting: ORTHOPAEDIC SURGERY

## 2018-04-25 VITALS — DIASTOLIC BLOOD PRESSURE: 74 MMHG | OXYGEN SATURATION: 98 % | HEART RATE: 74 BPM | SYSTOLIC BLOOD PRESSURE: 194 MMHG

## 2018-04-25 DIAGNOSIS — M16.11 PRIMARY OSTEOARTHRITIS OF RIGHT HIP: ICD-10-CM

## 2018-04-25 PROCEDURE — 25010000002 TRIAMCINOLONE PER 10 MG: Performed by: ORTHOPAEDIC SURGERY

## 2018-04-25 PROCEDURE — 0 IOPAMIDOL 41 % SOLUTION: Performed by: ORTHOPAEDIC SURGERY

## 2018-04-25 PROCEDURE — 77002 NEEDLE LOCALIZATION BY XRAY: CPT

## 2018-04-25 RX ORDER — TRIAMCINOLONE ACETONIDE 40 MG/ML
40 INJECTION, SUSPENSION INTRA-ARTICULAR; INTRAMUSCULAR ONCE
Status: COMPLETED | OUTPATIENT
Start: 2018-04-25 | End: 2018-04-25

## 2018-04-25 RX ORDER — LIDOCAINE HYDROCHLORIDE 10 MG/ML
20 INJECTION, SOLUTION INFILTRATION; PERINEURAL ONCE
Status: COMPLETED | OUTPATIENT
Start: 2018-04-25 | End: 2018-04-25

## 2018-04-25 RX ADMIN — TRIAMCINOLONE ACETONIDE 40 MG: 40 INJECTION, SUSPENSION INTRA-ARTICULAR; INTRAMUSCULAR at 13:45

## 2018-04-25 RX ADMIN — IOPAMIDOL 20 ML: 408 INJECTION, SOLUTION INTRATHECAL at 13:45

## 2018-04-25 RX ADMIN — LIDOCAINE HYDROCHLORIDE 20 ML: 10 INJECTION, SOLUTION INFILTRATION; PERINEURAL at 13:45

## 2018-06-25 ENCOUNTER — TRANSCRIBE ORDERS (OUTPATIENT)
Dept: GENERAL RADIOLOGY | Facility: HOSPITAL | Age: 83
End: 2018-06-25

## 2018-06-25 ENCOUNTER — LAB (OUTPATIENT)
Dept: LAB | Facility: HOSPITAL | Age: 83
End: 2018-06-25

## 2018-06-25 ENCOUNTER — HOSPITAL ENCOUNTER (OUTPATIENT)
Dept: ULTRASOUND IMAGING | Facility: HOSPITAL | Age: 83
Discharge: HOME | End: 2018-06-25
Admitting: NURSE PRACTITIONER

## 2018-06-25 ENCOUNTER — HOSPITAL ENCOUNTER (OUTPATIENT)
Dept: GENERAL RADIOLOGY | Facility: HOSPITAL | Age: 83
Discharge: HOME | End: 2018-06-25

## 2018-06-25 DIAGNOSIS — R53.83 OTHER FATIGUE: ICD-10-CM

## 2018-06-25 DIAGNOSIS — R60.9 EDEMA, UNSPECIFIED TYPE: ICD-10-CM

## 2018-06-25 DIAGNOSIS — R60.9 EDEMA, UNSPECIFIED TYPE: Primary | ICD-10-CM

## 2018-06-25 DIAGNOSIS — M79.604 RIGHT LEG PAIN: ICD-10-CM

## 2018-06-25 DIAGNOSIS — M79.605 LEFT LEG PAIN: ICD-10-CM

## 2018-06-25 LAB
ALBUMIN SERPL-MCNC: 4.4 G/DL (ref 3.5–5)
ALBUMIN/GLOB SERPL: 1.5 G/DL (ref 1.1–2.5)
ALP SERPL-CCNC: 84 U/L (ref 24–120)
ALT SERPL W P-5'-P-CCNC: 29 U/L (ref 0–54)
ANION GAP SERPL CALCULATED.3IONS-SCNC: 13 MMOL/L (ref 4–13)
AST SERPL-CCNC: 31 U/L (ref 7–45)
AUTO MIXED CELLS #: 0.5 10*3/MM3 (ref 0.1–2.6)
AUTO MIXED CELLS %: 7.7 % (ref 0.1–24)
BILIRUB SERPL-MCNC: 0.5 MG/DL (ref 0.1–1)
BILIRUB UR QL STRIP: NEGATIVE
BUN BLD-MCNC: 26 MG/DL (ref 5–21)
BUN/CREAT SERPL: 28
CALCIUM SPEC-SCNC: 9.3 MG/DL (ref 8.4–10.4)
CHLORIDE SERPL-SCNC: 99 MMOL/L (ref 98–110)
CLARITY UR: CLEAR
CO2 SERPL-SCNC: 30 MMOL/L (ref 24–31)
COLOR UR: YELLOW
CREAT BLD-MCNC: 0.93 MG/DL (ref 0.5–1.4)
ERYTHROCYTE [DISTWIDTH] IN BLOOD BY AUTOMATED COUNT: 13.8 % (ref 12–15)
GFR SERPL CREATININE-BSD FRML MDRD: 56 ML/MIN/1.73
GLOBULIN UR ELPH-MCNC: 2.9 GM/DL
GLUCOSE BLD-MCNC: 158 MG/DL (ref 70–100)
GLUCOSE UR STRIP-MCNC: NEGATIVE MG/DL
HCT VFR BLD AUTO: 36.5 % (ref 37–47)
HGB BLD-MCNC: 12.1 G/DL (ref 12–16)
HGB UR QL STRIP.AUTO: NEGATIVE
KETONES UR QL STRIP: NEGATIVE
LEUKOCYTE ESTERASE UR QL STRIP.AUTO: NEGATIVE
LYMPHOCYTES # BLD AUTO: 2.2 10*3/MM3 (ref 0.8–7)
LYMPHOCYTES NFR BLD AUTO: 30.6 % (ref 15–45)
MCH RBC QN AUTO: 29.4 PG (ref 28–32)
MCHC RBC AUTO-ENTMCNC: 33.2 G/DL (ref 33–36)
MCV RBC AUTO: 88.8 FL (ref 82–98)
NEUTROPHILS # BLD AUTO: 4.4 10*3/MM3 (ref 1.5–8.3)
NEUTROPHILS NFR BLD AUTO: 61.7 % (ref 39–78)
NITRITE UR QL STRIP: NEGATIVE
PH UR STRIP.AUTO: 5.5 [PH] (ref 5–8)
PLATELET # BLD AUTO: 312 10*3/MM3 (ref 130–400)
PMV BLD AUTO: 8.1 FL (ref 6–12)
POTASSIUM BLD-SCNC: 4 MMOL/L (ref 3.5–5.3)
PROT SERPL-MCNC: 7.3 G/DL (ref 6.3–8.7)
PROT UR QL STRIP: NEGATIVE
RBC # BLD AUTO: 4.11 10*6/MM3 (ref 4.2–5.4)
SODIUM BLD-SCNC: 142 MMOL/L (ref 135–145)
SP GR UR STRIP: 1.01 (ref 1–1.03)
UROBILINOGEN UR QL STRIP: NORMAL
WBC NRBC COR # BLD: 7.1 10*3/MM3 (ref 4.8–10.8)

## 2018-06-25 PROCEDURE — 80053 COMPREHEN METABOLIC PANEL: CPT

## 2018-06-25 PROCEDURE — 84439 ASSAY OF FREE THYROXINE: CPT | Performed by: NURSE PRACTITIONER

## 2018-06-25 PROCEDURE — 36415 COLL VENOUS BLD VENIPUNCTURE: CPT

## 2018-06-25 PROCEDURE — 82607 VITAMIN B-12: CPT | Performed by: NURSE PRACTITIONER

## 2018-06-25 PROCEDURE — 81003 URINALYSIS AUTO W/O SCOPE: CPT

## 2018-06-25 PROCEDURE — 84443 ASSAY THYROID STIM HORMONE: CPT | Performed by: NURSE PRACTITIONER

## 2018-06-25 PROCEDURE — 85025 COMPLETE CBC W/AUTO DIFF WBC: CPT

## 2018-06-25 PROCEDURE — 71046 X-RAY EXAM CHEST 2 VIEWS: CPT

## 2018-06-25 PROCEDURE — 93970 EXTREMITY STUDY: CPT

## 2018-06-26 LAB
T4 FREE SERPL-MCNC: 1.35 NG/DL (ref 0.78–2.19)
TSH SERPL DL<=0.05 MIU/L-ACNC: 2.23 MIU/ML (ref 0.47–4.68)
VIT B12 BLD-MCNC: 723 PG/ML (ref 239–931)

## 2018-07-23 ENCOUNTER — OFFICE VISIT (OUTPATIENT)
Dept: CARDIOLOGY | Facility: CLINIC | Age: 83
End: 2018-07-23

## 2018-07-23 VITALS
HEART RATE: 88 BPM | SYSTOLIC BLOOD PRESSURE: 138 MMHG | OXYGEN SATURATION: 99 % | HEIGHT: 65 IN | BODY MASS INDEX: 25.33 KG/M2 | DIASTOLIC BLOOD PRESSURE: 64 MMHG | WEIGHT: 152 LBS

## 2018-07-23 DIAGNOSIS — R60.0 PEDAL EDEMA: ICD-10-CM

## 2018-07-23 DIAGNOSIS — Z95.5 STENTED CORONARY ARTERY: ICD-10-CM

## 2018-07-23 DIAGNOSIS — I25.10 CORONARY ARTERY DISEASE INVOLVING NATIVE CORONARY ARTERY WITHOUT ANGINA PECTORIS, UNSPECIFIED WHETHER NATIVE OR TRANSPLANTED HEART: Primary | ICD-10-CM

## 2018-07-23 DIAGNOSIS — R06.09 DOE (DYSPNEA ON EXERTION): ICD-10-CM

## 2018-07-23 DIAGNOSIS — E78.2 MIXED HYPERLIPIDEMIA: ICD-10-CM

## 2018-07-23 DIAGNOSIS — I49.5 SSS (SICK SINUS SYNDROME) (HCC): ICD-10-CM

## 2018-07-23 DIAGNOSIS — I25.10 CAD IN NATIVE ARTERY: ICD-10-CM

## 2018-07-23 DIAGNOSIS — K21.9 GASTROESOPHAGEAL REFLUX DISEASE WITHOUT ESOPHAGITIS: ICD-10-CM

## 2018-07-23 PROCEDURE — 93000 ELECTROCARDIOGRAM COMPLETE: CPT | Performed by: INTERNAL MEDICINE

## 2018-07-23 PROCEDURE — 99214 OFFICE O/P EST MOD 30 MIN: CPT | Performed by: INTERNAL MEDICINE

## 2018-07-23 RX ORDER — BUMETANIDE 1 MG/1
1 TABLET ORAL DAILY
COMMUNITY
End: 2018-08-03 | Stop reason: SDUPTHER

## 2018-07-23 NOTE — PROGRESS NOTES
Referring Provider: Florentin Costello MD    Reason for Follow-up Visit: Here for routine follow up   coronary artery disease   stented coronary artery     Subjective .     Subjective    Mild chronic exertional shortness of breath on exertion relieved with rest  No significant cough or wheezing  Going on for several months  No palpitations  No associated chest pain  Moderate pedal edema  No fever or chills  No significant expectoration  No hemoptysis  No presyncope or syncope   Chronic low back pain    Arthritic pain in small joints   Effort tolerance limited more by orthopedic rather than cardiac related issues, therefore difficult to assess functional capacity.     No symptoms reminiscent of prior angina.        History of present illness:  Emiliano Recinos is a 89 y.o. yo female with history of coronary artery disease stented coronary artery who presents today for   Chief Complaint   Patient presents with   • Coronary Artery Disease     6 MON FU    • Edema     LEGS,FEET, HANDS   .    History  Past Medical History:   Diagnosis Date   • Bradycardia    • CAD in native artery    • Cardiac device in situ 03/29/2011    RCA stent   • Chest pain    • Diabetes mellitus type 2, uncomplicated (CMS/[facility])    • GERD (gastroesophageal reflux disease)    • Hard of hearing    • History of myocardial infarction 03/29/2011    EF 45% 2/11 cath   • Hyperlipemia, mixed    • Hypertension, benign    • Palpitation    • SSS (sick sinus syndrome) (CMS/[facility])    • Status cardiac pacemaker    • Syncope    ,   Past Surgical History:   Procedure Laterality Date   • BLADDER SUSPENSION      with hysterectomy   • CARDIAC CATHETERIZATION     • CORONARY STENT PLACEMENT      x 1   • HYSTERECTOMY      total   • INSERT / REPLACE / REMOVE PACEMAKER     • PACEMAKER IMPLANTATION     ,   Family History   Problem Relation Age of Onset   • Coronary artery disease Mother    • Heart attack Mother    ,   Social History   Substance Use Topics   • Smoking status:  "Never Smoker   • Smokeless tobacco: Never Used   • Alcohol use No   ,     Medications  Current Outpatient Prescriptions   Medication Sig Dispense Refill   • aspirin 81 MG tablet Take 81 mg by mouth daily.     • bumetanide (BUMEX) 1 MG tablet Take 1 mg by mouth Daily.     • clopidogrel (PLAVIX) 75 MG tablet Take 75 mg by mouth daily.     • lisinopril (PRINIVIL,ZESTRIL) 5 MG tablet Take 2.5 mg by mouth daily.     • nitroglycerin (NITROSTAT) 0.4 MG SL tablet Place 0.4 mg under the tongue daily.     • omeprazole (PriLOSEC) 20 MG capsule Take 20 mg by mouth daily.     • Polyethylene Glycol 3350 (MIRALAX PO) Take  by mouth.     • rosuvastatin (CRESTOR) 10 MG tablet Take 10 mg by mouth. Monday, Wed, Fri       No current facility-administered medications for this visit.        Allergies:  Metoclopramide    Review of Systems  Review of Systems   Constitution: Negative.   HENT: Negative.    Eyes: Negative.    Cardiovascular: Negative for chest pain, claudication, cyanosis, dyspnea on exertion, irregular heartbeat, leg swelling, near-syncope, orthopnea, palpitations, paroxysmal nocturnal dyspnea and syncope.   Respiratory: Negative.    Endocrine: Negative.    Hematologic/Lymphatic: Negative.    Skin: Negative.    Gastrointestinal: Negative for anorexia.   Genitourinary: Negative.    Neurological: Negative.    Psychiatric/Behavioral: Negative.        Objective     Physical Exam:  /64   Pulse 88   Ht 165.1 cm (65\")   Wt 68.9 kg (152 lb)   SpO2 99%   BMI 25.29 kg/m²   Physical Exam   Constitutional: She appears well-developed.   HENT:   Head: Normocephalic.   Neck: Normal carotid pulses and no JVD present. No tracheal tenderness present. Carotid bruit is not present. No tracheal deviation and no edema present.   Cardiovascular: Regular rhythm, normal heart sounds and normal pulses.    Pulmonary/Chest: Effort normal. No stridor.   Abdominal: Soft.   Neurological: She is alert. She has normal strength. No cranial nerve " deficit or sensory deficit.   Skin: Skin is warm.   Psychiatric: She has a normal mood and affect. Her speech is normal and behavior is normal.       Results Review:     Lab on 06/25/2018   Component Date Value Ref Range Status   • Glucose 06/25/2018 158* 70 - 100 mg/dL Final   • BUN 06/25/2018 26* 5 - 21 mg/dL Final   • Creatinine 06/25/2018 0.93  0.50 - 1.40 mg/dL Final   • Sodium 06/25/2018 142  135 - 145 mmol/L Final   • Potassium 06/25/2018 4.0  3.5 - 5.3 mmol/L Final   • Chloride 06/25/2018 99  98 - 110 mmol/L Final   • CO2 06/25/2018 30.0  24.0 - 31.0 mmol/L Final   • Calcium 06/25/2018 9.3  8.4 - 10.4 mg/dL Final   • Total Protein 06/25/2018 7.3  6.3 - 8.7 g/dL Final   • Albumin 06/25/2018 4.40  3.50 - 5.00 g/dL Final   • ALT (SGPT) 06/25/2018 29  0 - 54 U/L Final   • AST (SGOT) 06/25/2018 31  7 - 45 U/L Final   • Alkaline Phosphatase 06/25/2018 84  24 - 120 U/L Final   • Total Bilirubin 06/25/2018 0.5  0.1 - 1.0 mg/dL Final   • eGFR Non African Amer 06/25/2018 56* >60 mL/min/1.73 Final   • Globulin 06/25/2018 2.9  gm/dL Final   • A/G Ratio 06/25/2018 1.5  1.1 - 2.5 g/dL Final   • BUN/Creatinine Ratio 06/25/2018 28.0*   Final   • Anion Gap 06/25/2018 13.0  4.0 - 13.0 mmol/L Final   • TSH 06/25/2018 2.230  0.470 - 4.680 mIU/mL Final   • Vitamin B-12 06/25/2018 723  239 - 931 pg/mL Final   • Color, UA 06/25/2018 Yellow  Yellow, Straw Final   • Appearance, UA 06/25/2018 Clear  Clear Final   • pH, UA 06/25/2018 5.5  5.0 - 8.0 Final   • Specific Gravity, UA 06/25/2018 1.010  1.005 - 1.030 Final   • Glucose, UA 06/25/2018 Negative  Negative Final   • Ketones, UA 06/25/2018 Negative  Negative Final   • Bilirubin, UA 06/25/2018 Negative  Negative Final   • Blood, UA 06/25/2018 Negative  Negative Final   • Protein, UA 06/25/2018 Negative  Negative Final   • Leuk Esterase, UA 06/25/2018 Negative  Negative Final   • Nitrite, UA 06/25/2018 Negative  Negative Final   • Urobilinogen, UA 06/25/2018 0.2 E.U./dL  0.2 - 1.0  E.U./dL Final   • Free T4 06/25/2018 1.35  0.78 - 2.19 ng/dL Final   • WBC 06/25/2018 7.10  4.80 - 10.80 10*3/mm3 Final   • RBC 06/25/2018 4.11* 4.20 - 5.40 10*6/mm3 Final   • Hemoglobin 06/25/2018 12.1  12.0 - 16.0 g/dL Final   • Hematocrit 06/25/2018 36.5* 37.0 - 47.0 % Final   • MCV 06/25/2018 88.8  82.0 - 98.0 fL Final   • MCH 06/25/2018 29.4  28.0 - 32.0 pg Final   • MCHC 06/25/2018 33.2  33.0 - 36.0 g/dL Final   • RDW 06/25/2018 13.8  12.0 - 15.0 % Final   • MPV 06/25/2018 8.1  6.0 - 12.0 fL Final   • Platelets 06/25/2018 312  130 - 400 10*3/mm3 Final   • Neutrophil % 06/25/2018 61.7  39.0 - 78.0 % Final   • Lymphocyte % 06/25/2018 30.6  15.0 - 45.0 % Final   • Auto Mixed Cells % 06/25/2018 7.7  0.1 - 24.0 % Final   • Neutrophils, Absolute 06/25/2018 4.40  1.50 - 8.30 10*3/mm3 Final   • Lymphocytes, Absolute 06/25/2018 2.20  0.80 - 7.00 10*3/mm3 Final   • Auto Mixed Cells # 06/25/2018 0.50  0.10 - 2.60 10*3/mm3 Final     Results for orders placed during the hospital encounter of 03/29/17   Adult Transthoracic Echo Complete    Narrative · Left ventricular wall thickness is consistent with moderate concentric   hypertrophy.  · Left ventricular function is normal. Estimated EF = 65%.  · Left ventricular diastolic dysfunction (grade I) consistent with   impaired relaxation.  · Estimated right ventricular systolic pressure from tricuspid   regurgitation is normal (<35 mmHg).          ECG 12 Lead  Date/Time: 7/23/2018 1:06 PM  Performed by: JENNIFER RALPH  Authorized by: JENNIFER RALPH   Comparison: compared with previous ECG from 1/10/2018  Similar to previous ECG  Rhythm: sinus rhythm  Rate: normal  Conduction: conduction normal  ST Segments: ST segments normal  T Waves: T waves normal  QRS axis: normal  Other: no other findings  Clinical impression: normal ECG            Assessment/Plan   Patient Active Problem List   Diagnosis   • CAD (coronary artery disease)   • SSS (sick sinus syndrome) (CMS/HCC)   • GERD  "(gastroesophageal reflux disease)   • HLD (hyperlipidemia)   • CAD in native artery   • Stented coronary artery   • Pedal edema     Plan    Stable doing well. No chest pain or excessive dyspnea. No palpitations. No significant pedal edema. Compliant with medications and diet. Latest labs and medications reviewed.    Low salt/ HTN/ Heart healthy carbohydrate restricted cardiac diet as applicable to this patient's current diagnoses.   This handout has relevant information regarding shopping for food, preparing meals, what to eat at restaurants, tracking of food intake, information regarding sodium intake and salt content, how to read food labels, knowing what to eat, tips reagarding physical activity, calorie count and calorie expenditure. What foods to avoid. Information regarding alcoholic drinks along with \"good\" and \"bad\" fats.  Reiterated prior recommendations     The patient is advised to reduce or avoid caffeine or other cardiac stimulants.     The patient was advised that NSAID-type medications have three  very important potential side effects: cardiovascular complications, gastrointestinal irritation including hemorrhage and renal injuries.  Do not use anti-inflammatories such as Motrin/ibuprofen, Alleve/naprosyn, Mobic and like medications Use Tylenol instead.The patient expresses understanding of these issues and questions were answered.   Monitor for any signs of bleeding including red or dark stools. Fall precautions.       Monitor for any signs of bleeding including red or dark stools. Fall precautions.   Patient is asked to monitor BP at home or work, several times per month and return with written values at next office visit.     Advised staying uptodate with immunizations per established standard guidelines.    Reviewed available prior notes, consults, prior visits, laboratory findings, radiology And cardiology relevant reports. Updated chart as applicable. I have reviewed the patient's medical " history in detail and updated the computerized patient record as relevant.    Updated patient regarding any new or relevant abnormalities on review of records or any new findings on physical exam. Mentioned to patient about purpose of visit and desirable health short and long term goals and objectives.    Offered to give patient  a copy of my notes and relevant tests/ prior ECG etc for the patient to review and follow specific advise and relevant findings if any, prognosis, along with my current and future plans.      Questions were encouraged, asked and answered to the patient's  understanding and satisfaction. Questions if any regarding current medications and side effects, need for refills and importance of compliance to medications stressed.    Reviewed available prior notes, consults, prior visits, laboratory findings, radiology and cardiology relevant reports. Updated chart as applicable. I have reviewed the patient's medical history in detail and updated the computerized patient record as relevant.    Updated patient regarding any new or relevant abnormalities on review of records or any new findings on physical exam. Mentioned to patient about purpose of visit and desirable health short and long term goals and objectives.     No additional cardiac testing required at this point in time.     Orders Placed This Encounter   Procedures   • ECG 12 Lead     This order was created via procedure documentation   • Adult Transthoracic Echo Complete W/ Cont if Necessary Per Protocol     Standing Status:   Future     Standing Expiration Date:   7/23/2019     Order Specific Question:   Reason for exam?     Answer:   Dyspnea      Keep LDL below 70 mg/dl. Monitor liver and renal functions.   Monitor CBC, CMP, TSH (as indicated) and Lipid Panel by primary     Weigh yourself frequently, at least weekly, preferably daily, call me if more than 2 pounds a day or 5 pounds a week weight gain.  Flexible diuretic  dosing        Return in about 6 weeks (around 9/3/2018).

## 2018-07-26 ENCOUNTER — HOSPITAL ENCOUNTER (OUTPATIENT)
Dept: CARDIOLOGY | Facility: HOSPITAL | Age: 83
Discharge: HOME | End: 2018-07-26
Attending: INTERNAL MEDICINE | Admitting: INTERNAL MEDICINE

## 2018-07-26 VITALS
SYSTOLIC BLOOD PRESSURE: 138 MMHG | DIASTOLIC BLOOD PRESSURE: 64 MMHG | HEIGHT: 65 IN | BODY MASS INDEX: 25.33 KG/M2 | WEIGHT: 152 LBS

## 2018-07-26 DIAGNOSIS — R06.09 DOE (DYSPNEA ON EXERTION): ICD-10-CM

## 2018-07-26 DIAGNOSIS — I25.10 CAD IN NATIVE ARTERY: ICD-10-CM

## 2018-07-26 PROCEDURE — 93306 TTE W/DOPPLER COMPLETE: CPT | Performed by: INTERNAL MEDICINE

## 2018-07-26 PROCEDURE — 93306 TTE W/DOPPLER COMPLETE: CPT

## 2018-07-31 LAB
BH CV ECHO MEAS - AO MAX PG (FULL): 3.2 MMHG
BH CV ECHO MEAS - AO MAX PG: 7 MMHG
BH CV ECHO MEAS - AO MEAN PG (FULL): 2 MMHG
BH CV ECHO MEAS - AO MEAN PG: 4 MMHG
BH CV ECHO MEAS - AO ROOT AREA (BSA CORRECTED): 1.8
BH CV ECHO MEAS - AO ROOT AREA: 7.5 CM^2
BH CV ECHO MEAS - AO ROOT DIAM: 3.1 CM
BH CV ECHO MEAS - AO V2 MAX: 132 CM/SEC
BH CV ECHO MEAS - AO V2 MEAN: 95.5 CM/SEC
BH CV ECHO MEAS - AO V2 VTI: 31.1 CM
BH CV ECHO MEAS - AVA(I,A): 2.6 CM^2
BH CV ECHO MEAS - AVA(I,D): 2.6 CM^2
BH CV ECHO MEAS - AVA(V,A): 2.3 CM^2
BH CV ECHO MEAS - AVA(V,D): 2.3 CM^2
BH CV ECHO MEAS - BSA(HAYCOCK): 1.8 M^2
BH CV ECHO MEAS - BSA: 1.8 M^2
BH CV ECHO MEAS - BZI_BMI: 25.3 KILOGRAMS/M^2
BH CV ECHO MEAS - BZI_METRIC_HEIGHT: 165.1 CM
BH CV ECHO MEAS - BZI_METRIC_WEIGHT: 68.9 KG
BH CV ECHO MEAS - CONTRAST EF 4CH: 61 ML/M^2
BH CV ECHO MEAS - EDV(CUBED): 101.2 ML
BH CV ECHO MEAS - EDV(MOD-SP4): 39.5 ML
BH CV ECHO MEAS - EDV(TEICH): 100.3 ML
BH CV ECHO MEAS - EF(CUBED): 68.5 %
BH CV ECHO MEAS - EF(MOD-SP4): 61 %
BH CV ECHO MEAS - EF(TEICH): 60.1 %
BH CV ECHO MEAS - ESV(CUBED): 31.9 ML
BH CV ECHO MEAS - ESV(MOD-SP4): 15.4 ML
BH CV ECHO MEAS - ESV(TEICH): 40 ML
BH CV ECHO MEAS - FS: 32 %
BH CV ECHO MEAS - IVS/LVPW: 0.77
BH CV ECHO MEAS - IVSD: 0.8 CM
BH CV ECHO MEAS - LA DIMENSION: 3.4 CM
BH CV ECHO MEAS - LA/AO: 1.1
BH CV ECHO MEAS - LAT PEAK E' VEL: 5.8 CM/SEC
BH CV ECHO MEAS - LV DIASTOLIC VOL/BSA (35-75): 22.4 ML/M^2
BH CV ECHO MEAS - LV MASS(C)D: 143.6 GRAMS
BH CV ECHO MEAS - LV MASS(C)DI: 81.6 GRAMS/M^2
BH CV ECHO MEAS - LV MAX PG: 3.8 MMHG
BH CV ECHO MEAS - LV MEAN PG: 2 MMHG
BH CV ECHO MEAS - LV SYSTOLIC VOL/BSA (12-30): 8.7 ML/M^2
BH CV ECHO MEAS - LV V1 MAX: 97.5 CM/SEC
BH CV ECHO MEAS - LV V1 MEAN: 66.4 CM/SEC
BH CV ECHO MEAS - LV V1 VTI: 25.4 CM
BH CV ECHO MEAS - LVIDD: 4.7 CM
BH CV ECHO MEAS - LVIDS: 3.2 CM
BH CV ECHO MEAS - LVLD AP4: 6.4 CM
BH CV ECHO MEAS - LVLS AP4: 5.6 CM
BH CV ECHO MEAS - LVOT AREA (M): 3.1 CM^2
BH CV ECHO MEAS - LVOT AREA: 3.1 CM^2
BH CV ECHO MEAS - LVOT DIAM: 2 CM
BH CV ECHO MEAS - LVPWD: 1 CM
BH CV ECHO MEAS - MV A MAX VEL: 130 CM/SEC
BH CV ECHO MEAS - MV DEC TIME: 0.28 SEC
BH CV ECHO MEAS - MV E MAX VEL: 78.6 CM/SEC
BH CV ECHO MEAS - MV E/A: 0.6
BH CV ECHO MEAS - RAP SYSTOLE: 5 MMHG
BH CV ECHO MEAS - RVSP: 28.8 MMHG
BH CV ECHO MEAS - SI(AO): 133.3 ML/M^2
BH CV ECHO MEAS - SI(CUBED): 39.4 ML/M^2
BH CV ECHO MEAS - SI(LVOT): 45.3 ML/M^2
BH CV ECHO MEAS - SI(MOD-SP4): 13.7 ML/M^2
BH CV ECHO MEAS - SI(TEICH): 34.3 ML/M^2
BH CV ECHO MEAS - SV(AO): 234.7 ML
BH CV ECHO MEAS - SV(CUBED): 69.3 ML
BH CV ECHO MEAS - SV(LVOT): 79.8 ML
BH CV ECHO MEAS - SV(MOD-SP4): 24.1 ML
BH CV ECHO MEAS - SV(TEICH): 60.3 ML
BH CV ECHO MEAS - TR MAX VEL: 244 CM/SEC
LEFT ATRIUM VOLUME INDEX: 37.5 ML/M2
LV EF 2D ECHO EST: 60 %
MAXIMAL PREDICTED HEART RATE: 125 BPM
STRESS TARGET HR: 106 BPM

## 2018-08-05 RX ORDER — BUMETANIDE 1 MG/1
1 TABLET ORAL 2 TIMES DAILY
Qty: 60 TABLET | Refills: 11 | Status: SHIPPED | OUTPATIENT
Start: 2018-08-05 | End: 2020-02-13

## 2018-08-17 ENCOUNTER — TELEPHONE (OUTPATIENT)
Dept: CARDIOLOGY | Facility: CLINIC | Age: 83
End: 2018-08-17

## 2018-08-17 NOTE — TELEPHONE ENCOUNTER
RECEIVED A CALL FROM [facility] STATING THAT PT HAD RECEIVED AN RX FOR TRAMADOL 50 MG TO TREAT PAIN IN LEGS.    SHE WOULD LIKE TO KNOW IF THIS IS OKAY FOR HER TO TAKE?  PLACED A CALL TO PT TO CONFIRM.     PLEASE ADVISE

## 2018-09-13 ENCOUNTER — CLINICAL SUPPORT NO REQUIREMENTS (OUTPATIENT)
Dept: CARDIOLOGY | Facility: CLINIC | Age: 83
End: 2018-09-13

## 2018-09-13 ENCOUNTER — OFFICE VISIT (OUTPATIENT)
Dept: CARDIOLOGY | Facility: CLINIC | Age: 83
End: 2018-09-13

## 2018-09-13 VITALS
BODY MASS INDEX: 23.82 KG/M2 | WEIGHT: 143 LBS | OXYGEN SATURATION: 99 % | SYSTOLIC BLOOD PRESSURE: 110 MMHG | HEIGHT: 65 IN | DIASTOLIC BLOOD PRESSURE: 50 MMHG | HEART RATE: 76 BPM

## 2018-09-13 DIAGNOSIS — I47.29 NSVT (NONSUSTAINED VENTRICULAR TACHYCARDIA) (HCC): ICD-10-CM

## 2018-09-13 DIAGNOSIS — I25.10 CAD IN NATIVE ARTERY: ICD-10-CM

## 2018-09-13 DIAGNOSIS — Z95.0 PACEMAKER: Primary | ICD-10-CM

## 2018-09-13 DIAGNOSIS — E78.2 MIXED HYPERLIPIDEMIA: ICD-10-CM

## 2018-09-13 DIAGNOSIS — I49.5 SSS (SICK SINUS SYNDROME) (HCC): ICD-10-CM

## 2018-09-13 DIAGNOSIS — R60.0 PEDAL EDEMA: ICD-10-CM

## 2018-09-13 DIAGNOSIS — I25.10 CORONARY ARTERY DISEASE INVOLVING NATIVE CORONARY ARTERY WITHOUT ANGINA PECTORIS, UNSPECIFIED WHETHER NATIVE OR TRANSPLANTED HEART: ICD-10-CM

## 2018-09-13 DIAGNOSIS — K21.9 GASTROESOPHAGEAL REFLUX DISEASE WITHOUT ESOPHAGITIS: ICD-10-CM

## 2018-09-13 DIAGNOSIS — Z95.5 STENTED CORONARY ARTERY: Primary | ICD-10-CM

## 2018-09-13 PROCEDURE — 93288 INTERROG EVL PM/LDLS PM IP: CPT | Performed by: INTERNAL MEDICINE

## 2018-09-13 PROCEDURE — 99214 OFFICE O/P EST MOD 30 MIN: CPT | Performed by: INTERNAL MEDICINE

## 2018-09-13 RX ORDER — CARVEDILOL 3.12 MG/1
3.12 TABLET ORAL 2 TIMES DAILY
Qty: 180 TABLET | Refills: 3 | Status: SHIPPED | OUTPATIENT
Start: 2018-09-13 | End: 2019-06-08 | Stop reason: SDUPTHER

## 2018-09-13 NOTE — PROGRESS NOTES
Referring Provider: Florentin Costello MD    Reason for Follow-up Visit:Here for routine follow up   coronary artery disease   stented coronary artery     Subjective .     Subjective    Mild chronic exertional shortness of breath on exertion relieved with rest  No significant cough or wheezing  Going on for several months  No palpitations  No associated chest pain  Moderate pedal edema  No fever or chills  No significant expectoration  No hemoptysis  No presyncope or syncope   Chronic low back pain    Arthritic pain in small joints   Effort tolerance limited more by orthopedic rather than cardiac related issues, therefore difficult to assess functional capacity.     No symptoms reminiscent of prior angina.        History of present illness:  Emiliano Recinos is a 89 y.o. yo female with history of coronary artery disease stented coronary artery who presents today for   Chief Complaint   Patient presents with   • Coronary Artery Disease     6 WK FU/ RESULTS   .    History  Past Medical History:   Diagnosis Date   • Bradycardia    • CAD in native artery    • Cardiac device in situ 03/29/2011    RCA stent   • Chest pain    • Diabetes mellitus type 2, uncomplicated (CMS/[facility])    • GERD (gastroesophageal reflux disease)    • Hard of hearing    • History of myocardial infarction 03/29/2011    EF 45% 2/11 cath   • Hyperlipemia, mixed    • Hypertension, benign    • Palpitation    • SSS (sick sinus syndrome) (CMS/[facility])    • Status cardiac pacemaker    • Syncope    ,   Past Surgical History:   Procedure Laterality Date   • BLADDER SUSPENSION      with hysterectomy   • CARDIAC CATHETERIZATION     • CORONARY STENT PLACEMENT      x 1   • HYSTERECTOMY      total   • INSERT / REPLACE / REMOVE PACEMAKER     • PACEMAKER IMPLANTATION     ,   Family History   Problem Relation Age of Onset   • Coronary artery disease Mother    • Heart attack Mother    ,   Social History   Substance Use Topics   • Smoking status: Never Smoker   •  "Smokeless tobacco: Never Used   • Alcohol use No   ,     Medications  Current Outpatient Prescriptions   Medication Sig Dispense Refill   • aspirin 81 MG tablet Take 81 mg by mouth daily.     • bumetanide (BUMEX) 1 MG tablet Take 1 tablet by mouth 2 (Two) Times a Day. 60 tablet 11   • clopidogrel (PLAVIX) 75 MG tablet Take 75 mg by mouth daily.     • lisinopril (PRINIVIL,ZESTRIL) 5 MG tablet Take 2.5 mg by mouth daily.     • nitroglycerin (NITROSTAT) 0.4 MG SL tablet Place 0.4 mg under the tongue daily.     • omeprazole (PriLOSEC) 20 MG capsule Take 20 mg by mouth daily.     • Polyethylene Glycol 3350 (MIRALAX PO) Take  by mouth.     • rosuvastatin (CRESTOR) 10 MG tablet Take 10 mg by mouth. Monday, Wed, Fri     • carvedilol (COREG) 3.125 MG tablet Take 1 tablet by mouth 2 (Two) Times a Day. 180 tablet 3     No current facility-administered medications for this visit.        Allergies:  Metoclopramide    Review of Systems  Review of Systems   Constitution: Negative.   HENT: Negative.    Eyes: Negative.    Cardiovascular: Negative for chest pain, claudication, cyanosis, dyspnea on exertion, irregular heartbeat, leg swelling, near-syncope, orthopnea, palpitations, paroxysmal nocturnal dyspnea and syncope.   Respiratory: Negative.    Endocrine: Negative.    Hematologic/Lymphatic: Negative.    Skin: Negative.    Musculoskeletal: Positive for arthritis and joint pain.   Gastrointestinal: Negative for anorexia.   Genitourinary: Negative.    Neurological: Negative.    Psychiatric/Behavioral: Negative.        Objective     Physical Exam:  /50   Pulse 76   Ht 165.1 cm (65\")   Wt 64.9 kg (143 lb)   SpO2 99%   BMI 23.80 kg/m²   Physical Exam   Constitutional: She appears well-developed.   HENT:   Head: Normocephalic.   Neck: Normal carotid pulses and no JVD present. No tracheal tenderness present. Carotid bruit is not present. No tracheal deviation and no edema present.   Cardiovascular: Regular rhythm, normal heart " sounds and normal pulses.    Pulmonary/Chest: Effort normal. No stridor.   Abdominal: Soft.   Neurological: She is alert. She has normal strength. No cranial nerve deficit or sensory deficit.   Skin: Skin is warm.   Psychiatric: She has a normal mood and affect. Her speech is normal and behavior is normal.       Results Review:       Results for orders placed during the hospital encounter of 07/26/18   Adult Transthoracic Echo Complete W/ Cont if Necessary Per Protocol    Narrative · Left ventricular systolic function is normal. Estimated EF = 60%.  · Left ventricular diastolic dysfunction.  · No evidence of pulmonary hypertension is present.      myocardial perfusion scan  3/17    · Myocardial perfusion imaging indicates a normal myocardial perfusion study with no evidence of ischemia.  · Impressions are consistent with a low risk study.  · Left ventricular ejection fraction is hyperdynamic (Calculated EF > 70%).  · Raw images reviewed with the following abnormalities noted: scanned with arms down.        Procedures    Assessment/Plan   Patient Active Problem List   Diagnosis   • CAD (coronary artery disease)   • SSS (sick sinus syndrome) (CMS/[facility])   • GERD (gastroesophageal reflux disease)   • HLD (hyperlipidemia)   • CAD in native artery   • Stented coronary artery   • Pedal edema   • NSVT (nonsustained ventricular tachycardia) (CMS/[facility])     Plan      Due to  non sustained ventricular tachycardia on pacer check with normal LVEF and Low risk stress test with normal LVEF by echo cardiogram start  Requested Prescriptions     Signed Prescriptions Disp Refills   • carvedilol (COREG) 3.125 MG tablet 180 tablet 3     Sig: Take 1 tablet by mouth 2 (Two) Times a Day.      Stable doing well. No chest pain or excessive dyspnea. No palpitations. No significant pedal edema. Compliant with medications and diet. Latest labs and medications reviewed.    Low salt/ HTN/ Heart healthy carbohydrate restricted cardiac diet as  "applicable to this patient's current diagnoses.   This handout has relevant information regarding shopping for food, preparing meals, what to eat at restaurants, tracking of food intake, information regarding sodium intake and salt content, how to read food labels, knowing what to eat, tips reagarding physical activity, calorie count and calorie expenditure. What foods to avoid. Information regarding alcoholic drinks along with \"good\" and \"bad\" fats.  Reiterated prior recommendations     The patient is advised to reduce or avoid caffeine or other cardiac stimulants.     The patient was advised that NSAID-type medications have three  very important potential side effects: cardiovascular complications, gastrointestinal irritation including hemorrhage and renal injuries.  Do not use anti-inflammatories such as Motrin/ibuprofen, Alleve/naprosyn, Mobic and like medications Use Tylenol instead.The patient expresses understanding of these issues and questions were answered.   Monitor for any signs of bleeding including red or dark stools. Fall precautions.       Monitor for any signs of bleeding including red or dark stools. Fall precautions.   Patient is asked to monitor BP at home or work, several times per month and return with written values at next office visit.     Advised staying uptodate with immunizations per established standard guidelines.    Reviewed available prior notes, consults, prior visits, laboratory findings, radiology And cardiology relevant reports. Updated chart as applicable. I have reviewed the patient's medical history in detail and updated the computerized patient record as relevant.    Updated patient regarding any new or relevant abnormalities on review of records or any new findings on physical exam. Mentioned to patient about purpose of visit and desirable health short and long term goals and objectives.    Offered to give patient  a copy of my notes and relevant tests/ prior ECG etc for the " patient to review and follow specific advise and relevant findings if any, prognosis, along with my current and future plans.      Questions were encouraged, asked and answered to the patient's  understanding and satisfaction. Questions if any regarding current medications and side effects, need for refills and importance of compliance to medications stressed.    Reviewed available prior notes, consults, prior visits, laboratory findings, radiology and cardiology relevant reports. Updated chart as applicable. I have reviewed the patient's medical history in detail and updated the computerized patient record as relevant.    Updated patient regarding any new or relevant abnormalities on review of records or any new findings on physical exam. Mentioned to patient about purpose of visit and desirable health short and long term goals and objectives.     No additional cardiac testing required at this point in time.        Keep LDL below 70 mg/dl. Monitor liver and renal functions.   Monitor CBC, CMP, TSH (as indicated) and Lipid Panel by primary     Weigh yourself frequently, at least weekly, preferably daily, call me if more than 2 pounds a day or 5 pounds a week weight gain.  Flexible diuretic dosing    Monitor cardiac rhythm device function regularly per established schedule or PRN           Return in about 6 months (around 3/13/2019).

## 2018-09-21 NOTE — PROGRESS NOTES
Dual Chamber Pacemaker Evaluation Report  IN OFFICE INTERROGATION    September 21, 2018    Interrogation Date:  9/13/2018    Primary Cardiologist: Kaylen  : Medtronic Model: Revo MRI RVDR01  Implant date: 1/21/2013    Reason for evaluation: routine  Indication for pacemaker: sick sinus syndrome    Measurements  Atrial sensing - P wave: 4 mV  Atrial threshold: 0.5V@ 0.4ms  Atrial lead impedance: 544 ohms  Ventricular sensing - R wave: 9.1 mV  Ventricular threshold: 0.5 V @ 0.4 ms  Ventricular lead impedance:   472 ohms     Diagnostic Data  Atrial paced: 13.2 %  Ventricular paced: <0.1 %    Episodes recorded since 3/8/2018  1 SVT episode, duration approximately 5 seconds    Battery status: satisfactory, 2.99V.  RN spoke with [employer], who estimated 3.3-3.8 years remaining.      Final Parameters  Mode:  AAIR+  Lower rate: 60 bpm   Upper rate: 130 bpm  AV Delay: paced- 180 ms  Sensed-150 ms  Atrial - Amplitude: 1.5 V   Pulse width: 0.4 ms   Sensitivity: 0.3 mV     Ventricular - Amplitude: 2 V  Pulse width: 0.4 ms  Sensitivity: 0.9 mV    Changes made: No changes made.  Conclusions: normal pacemaker function, stable pacing and sensing thresholds and adequate battery reserve    Follow up: 6 months

## 2018-10-11 ENCOUNTER — OFFICE VISIT (OUTPATIENT)
Dept: RETAIL CLINIC | Facility: CLINIC | Age: 83
End: 2018-10-11

## 2018-10-11 DIAGNOSIS — Z23 FLU VACCINE NEED: Primary | ICD-10-CM

## 2018-12-19 ENCOUNTER — TRANSCRIBE ORDERS (OUTPATIENT)
Dept: ADMINISTRATIVE | Facility: HOSPITAL | Age: 83
End: 2018-12-19

## 2018-12-19 DIAGNOSIS — M54.41 CHRONIC LOW BACK PAIN WITH RIGHT-SIDED SCIATICA, UNSPECIFIED BACK PAIN LATERALITY: Primary | ICD-10-CM

## 2018-12-19 DIAGNOSIS — G89.29 CHRONIC LOW BACK PAIN WITH RIGHT-SIDED SCIATICA, UNSPECIFIED BACK PAIN LATERALITY: Primary | ICD-10-CM

## 2018-12-21 ENCOUNTER — HOSPITAL ENCOUNTER (OUTPATIENT)
Dept: MRI IMAGING | Facility: HOSPITAL | Age: 83
Discharge: HOME | End: 2018-12-21
Admitting: NURSE PRACTITIONER

## 2018-12-21 DIAGNOSIS — M54.41 CHRONIC LOW BACK PAIN WITH RIGHT-SIDED SCIATICA, UNSPECIFIED BACK PAIN LATERALITY: ICD-10-CM

## 2018-12-21 DIAGNOSIS — G89.29 CHRONIC LOW BACK PAIN WITH RIGHT-SIDED SCIATICA, UNSPECIFIED BACK PAIN LATERALITY: ICD-10-CM

## 2018-12-21 PROCEDURE — 72148 MRI LUMBAR SPINE W/O DYE: CPT

## 2019-03-14 ENCOUNTER — OFFICE VISIT (OUTPATIENT)
Dept: CARDIOLOGY | Facility: CLINIC | Age: 84
End: 2019-03-14

## 2019-03-14 ENCOUNTER — CLINICAL SUPPORT NO REQUIREMENTS (OUTPATIENT)
Dept: CARDIOLOGY | Facility: CLINIC | Age: 84
End: 2019-03-14

## 2019-03-14 VITALS
HEART RATE: 75 BPM | DIASTOLIC BLOOD PRESSURE: 60 MMHG | OXYGEN SATURATION: 98 % | WEIGHT: 145 LBS | BODY MASS INDEX: 24.16 KG/M2 | SYSTOLIC BLOOD PRESSURE: 150 MMHG | HEIGHT: 65 IN

## 2019-03-14 DIAGNOSIS — I49.5 SSS (SICK SINUS SYNDROME) (HCC): ICD-10-CM

## 2019-03-14 DIAGNOSIS — E78.2 MIXED HYPERLIPIDEMIA: ICD-10-CM

## 2019-03-14 DIAGNOSIS — Z95.0 PACEMAKER: Primary | ICD-10-CM

## 2019-03-14 DIAGNOSIS — R60.0 PEDAL EDEMA: ICD-10-CM

## 2019-03-14 DIAGNOSIS — K21.9 GASTROESOPHAGEAL REFLUX DISEASE, ESOPHAGITIS PRESENCE NOT SPECIFIED: ICD-10-CM

## 2019-03-14 DIAGNOSIS — Z95.5 STENTED CORONARY ARTERY: ICD-10-CM

## 2019-03-14 DIAGNOSIS — I25.10 CORONARY ARTERY DISEASE INVOLVING NATIVE CORONARY ARTERY OF NATIVE HEART WITHOUT ANGINA PECTORIS: Primary | ICD-10-CM

## 2019-03-14 DIAGNOSIS — I47.29 NSVT (NONSUSTAINED VENTRICULAR TACHYCARDIA) (HCC): ICD-10-CM

## 2019-03-14 PROCEDURE — 93000 ELECTROCARDIOGRAM COMPLETE: CPT | Performed by: INTERNAL MEDICINE

## 2019-03-14 PROCEDURE — 93280 PM DEVICE PROGR EVAL DUAL: CPT | Performed by: INTERNAL MEDICINE

## 2019-03-14 PROCEDURE — 99214 OFFICE O/P EST MOD 30 MIN: CPT | Performed by: INTERNAL MEDICINE

## 2019-03-14 NOTE — PROGRESS NOTES
Dual Chamber Pacemaker Evaluation Report  IN OFFICE INTERROGATION    March 14, 2019    Primary Cardiologist: Kaylen  : Medtronic Model: Revo MRI RVDR01  Implant date: 1/21/2013    Reason for evaluation: routine  Indication for pacemaker: sick sinus syndrome    Measurements  Atrial sensing - P wave: 2.6 mV  Atrial threshold: 1V@ 0.4ms  Atrial lead impedance: 456 ohms  Ventricular sensing - R wave: 7.1 mV  Ventricular threshold: 1 V @ 0.4 ms  Ventricular lead impedance:   440 ohms     Diagnostic Data  Atrial paced: 39.7 %  Ventricular paced: <0.1 %    Episodes recorded since 9/13/2018:  No episodes.    Battery status: satisfactory, 2.97V      Final Parameters  Mode:  AAIR+  Lower rate: 60 bpm   Upper rate: 130 bpm  AV Delay: paced- 180 ms  Sensed-150 ms  Atrial - Amplitude: 2 V   Pulse width: 0.4 ms   Sensitivity: 0.3 mV     Ventricular - Amplitude: 2 V  Pulse width: 0.4 ms  Sensitivity: 0.9 mV    Changes made: Normal rajan atrial output changed to 2V based on threshold testing.  Conclusions: normal pacemaker function, stable pacing and sensing thresholds and adequate battery reserve    Follow up: 6 months in office

## 2019-03-14 NOTE — PROGRESS NOTES
Referring Provider: Florentin Costello MD    Reason for Follow-up Visit:Here for routine follow up   coronary artery disease   stented coronary artery   sick sinus syndrome s/p pacemaker      Subjective    Overall feeling well   No chest pain or shortness of breath     No palpitations  No significant pedal edema    Compliant with medications and dietary advice  Good effort tolerance    No presyncope or syncope  Compliant with medications      Easy fatiguability     Joint pain in small, medium and large joints   Chronic low back pain      History of present illness:  Emiliano Recinos is a 89 y.o. yo female with history of coronary artery disease stented coronary artery who presents today for   Chief Complaint   Patient presents with   • Coronary Artery Disease     6 mo f/u   .    History  Past Medical History:   Diagnosis Date   • Bradycardia    • CAD in native artery    • Cardiac device in situ 03/29/2011    RCA stent   • Chest pain    • Diabetes mellitus type 2, uncomplicated (CMS/[facility])    • GERD (gastroesophageal reflux disease)    • Hard of hearing    • History of myocardial infarction 03/29/2011    EF 45% 2/11 cath   • Hyperlipemia, mixed    • Hypertension, benign    • Palpitation    • SSS (sick sinus syndrome) (CMS/[facility])    • Status cardiac pacemaker    • Syncope    ,   Past Surgical History:   Procedure Laterality Date   • BLADDER SUSPENSION      with hysterectomy   • CARDIAC CATHETERIZATION     • CORONARY STENT PLACEMENT      x 1   • HYSTERECTOMY      total   • INSERT / REPLACE / REMOVE PACEMAKER     • PACEMAKER IMPLANTATION     ,   Family History   Problem Relation Age of Onset   • Coronary artery disease Mother    • Heart attack Mother    ,   Social History     Tobacco Use   • Smoking status: Never Smoker   • Smokeless tobacco: Never Used   Substance Use Topics   • Alcohol use: No   • Drug use: No   ,     Medications  Current Outpatient Medications   Medication Sig Dispense Refill   • aspirin 81 MG tablet  "Take 81 mg by mouth daily.     • bumetanide (BUMEX) 1 MG tablet Take 1 tablet by mouth 2 (Two) Times a Day. 60 tablet 11   • carvedilol (COREG) 3.125 MG tablet Take 1 tablet by mouth 2 (Two) Times a Day. 180 tablet 3   • clopidogrel (PLAVIX) 75 MG tablet Take 75 mg by mouth daily.     • nitroglycerin (NITROSTAT) 0.4 MG SL tablet Place 0.4 mg under the tongue daily.     • omeprazole (PriLOSEC) 20 MG capsule Take 20 mg by mouth daily.     • Polyethylene Glycol 3350 (MIRALAX PO) Take  by mouth.     • rosuvastatin (CRESTOR) 10 MG tablet Take 10 mg by mouth. Monday, Wed, Fri       No current facility-administered medications for this visit.        Allergies:  Metoclopramide    Review of Systems  Review of Systems   Constitution: Positive for weakness and malaise/fatigue.   HENT: Negative.    Eyes: Negative.    Cardiovascular: Positive for leg swelling. Negative for chest pain, claudication, cyanosis, dyspnea on exertion, irregular heartbeat, near-syncope, orthopnea, palpitations, paroxysmal nocturnal dyspnea and syncope.   Respiratory: Negative.    Endocrine: Negative.    Hematologic/Lymphatic: Negative.    Skin: Negative.    Musculoskeletal: Positive for arthritis and joint pain.   Gastrointestinal: Negative for anorexia.   Genitourinary: Negative.    Psychiatric/Behavioral: Negative.        Objective     Physical Exam:  /60   Pulse 75   Ht 165.1 cm (65\")   Wt 65.8 kg (145 lb)   SpO2 98%   BMI 24.13 kg/m²   Physical Exam   Constitutional: She appears well-developed.   HENT:   Head: Normocephalic.   Neck: Normal carotid pulses and no JVD present. No tracheal tenderness present. Carotid bruit is not present. No tracheal deviation and no edema present.   Cardiovascular: Regular rhythm, normal heart sounds and normal pulses.   Pulmonary/Chest: Effort normal. No stridor.   Abdominal: Soft.   Neurological: She is alert. She has normal strength. No cranial nerve deficit or sensory deficit.   Skin: Skin is warm. "   Psychiatric: She has a normal mood and affect. Her speech is normal and behavior is normal.       Results Review:       Results for orders placed during the hospital encounter of 07/26/18   Adult Transthoracic Echo Complete W/ Cont if Necessary Per Protocol    Narrative · Left ventricular systolic function is normal. Estimated EF = 60%.  · Left ventricular diastolic dysfunction.  · No evidence of pulmonary hypertension is present.      myocardial perfusion scan  3/17    · Myocardial perfusion imaging indicates a normal myocardial perfusion study with no evidence of ischemia.  · Impressions are consistent with a low risk study.  · Left ventricular ejection fraction is hyperdynamic (Calculated EF > 70%).  · Raw images reviewed with the following abnormalities noted: scanned with arms down.          ECG 12 Lead  Date/Time: 3/14/2019 10:37 AM  Performed by: Douglas Abdullahi MD  Authorized by: Douglas Abdullahi MD   Comparison: compared with previous ECG from 7/23/2018  Comparison to previous ECG: Poor R wave progression   Rhythm: sinus rhythm  Rate: normal  Q waves: V1, V2 and V3    ST Segments: ST segments normal  T Waves: T waves normal  Other: no other findings    Clinical impression: abnormal EKG            Assessment/Plan   Patient Active Problem List   Diagnosis   • CAD (coronary artery disease)   • SSS (sick sinus syndrome) (CMS/HCC)   • GERD (gastroesophageal reflux disease)   • HLD (hyperlipidemia)   • CAD in native artery   • Stented coronary artery   • Pedal edema   • NSVT (nonsustained ventricular tachycardia) (CMS/[facility])          Plan    Monitor cardiac rhythm device function regularly per established schedule or PRN     Weigh yourself frequently, at least weekly, preferably daily, call me if more than 2 pounds a day or 5 pounds a week weight gain.  Flexible diuretic dosing      ____________________________________________________________________________________________________________________________________________  Health maintenance and recommendations      Low salt/ HTN/ Heart healthy carbohydrate restricted cardiac diet   The patient is advised to reduce or avoid caffeine or other cardiac stimulants.     The patient was advised that NSAID-type medications        Monitor for any signs of bleeding including red or dark stools. Fall precautions.        Advised staying uptodate with immunizations per established standard guidelines.      Offered to give patient  a copy      Questions were encouraged, asked and answered to the patient's  understanding and satisfaction. Questions if any regarding current medications and side effects, need for refills and importance of compliance to medications stressed.    Reviewed available prior notes, consults, prior visits, laboratory findings, radiology and cardiology relevant reports. Updated chart as applicable. I have reviewed the patient's medical history in detail and updated the computerized patient record as relevant.      Updated patient regarding any new or relevant abnormalities on review of records or any new findings on physical exam. Mentioned to patient about purpose of visit and desirable health short and long term goals and objectives.    Primary to monitor CBC CMP Lipid panel and TSH as applicable    ___________________________________________________________________________________________________________________________________________            Monitor cardiac rhythm device function regularly per established schedule or PRN           Return in about 6 months (around 9/14/2019).

## 2019-05-20 ENCOUNTER — TELEPHONE (OUTPATIENT)
Dept: CARDIOLOGY | Facility: CLINIC | Age: 84
End: 2019-05-20

## 2019-05-20 NOTE — TELEPHONE ENCOUNTER
office send us a fax stated that they seen the patient on 05/16/2019 for right hip pain  prescribed MELOXICAM AND TRAMADOL, she told  that she was told by us not to take Tylenol or arthritis medications and they are just wanting to make sure she can take the medication that was prescribed above.     Please advise.

## 2019-06-11 RX ORDER — CARVEDILOL 3.12 MG/1
TABLET ORAL
Qty: 180 TABLET | Refills: 3 | Status: SHIPPED | OUTPATIENT
Start: 2019-06-11 | End: 2020-08-24

## 2019-07-25 ENCOUNTER — HOSPITAL ENCOUNTER (OUTPATIENT)
Dept: ULTRASOUND IMAGING | Facility: HOSPITAL | Age: 84
Discharge: HOME | End: 2019-07-25
Admitting: NURSE PRACTITIONER

## 2019-07-25 ENCOUNTER — TRANSCRIBE ORDERS (OUTPATIENT)
Dept: ADMINISTRATIVE | Facility: HOSPITAL | Age: 84
End: 2019-07-25

## 2019-07-25 DIAGNOSIS — M79.604 RIGHT LEG PAIN: Primary | ICD-10-CM

## 2019-07-25 DIAGNOSIS — M79.604 RIGHT LEG PAIN: ICD-10-CM

## 2019-07-25 PROCEDURE — 93971 EXTREMITY STUDY: CPT | Performed by: SURGERY

## 2019-07-25 PROCEDURE — 93971 EXTREMITY STUDY: CPT

## 2019-09-13 ENCOUNTER — OFFICE VISIT (OUTPATIENT)
Dept: CARDIOLOGY | Facility: CLINIC | Age: 84
End: 2019-09-13

## 2019-09-13 ENCOUNTER — CLINICAL SUPPORT NO REQUIREMENTS (OUTPATIENT)
Dept: CARDIOLOGY | Facility: CLINIC | Age: 84
End: 2019-09-13

## 2019-09-13 VITALS
DIASTOLIC BLOOD PRESSURE: 60 MMHG | WEIGHT: 138 LBS | SYSTOLIC BLOOD PRESSURE: 153 MMHG | HEIGHT: 65 IN | HEART RATE: 65 BPM | OXYGEN SATURATION: 98 % | BODY MASS INDEX: 22.99 KG/M2

## 2019-09-13 DIAGNOSIS — I49.5 SSS (SICK SINUS SYNDROME) (HCC): ICD-10-CM

## 2019-09-13 DIAGNOSIS — Z95.5 STENTED CORONARY ARTERY: ICD-10-CM

## 2019-09-13 DIAGNOSIS — K21.9 GASTROESOPHAGEAL REFLUX DISEASE, ESOPHAGITIS PRESENCE NOT SPECIFIED: ICD-10-CM

## 2019-09-13 DIAGNOSIS — R60.0 PEDAL EDEMA: ICD-10-CM

## 2019-09-13 DIAGNOSIS — E78.2 MIXED HYPERLIPIDEMIA: ICD-10-CM

## 2019-09-13 DIAGNOSIS — I25.10 CORONARY ARTERY DISEASE INVOLVING NATIVE CORONARY ARTERY OF NATIVE HEART WITHOUT ANGINA PECTORIS: ICD-10-CM

## 2019-09-13 DIAGNOSIS — Z95.0 PACEMAKER: Primary | ICD-10-CM

## 2019-09-13 DIAGNOSIS — I25.10 CAD IN NATIVE ARTERY: ICD-10-CM

## 2019-09-13 DIAGNOSIS — I47.29 NSVT (NONSUSTAINED VENTRICULAR TACHYCARDIA) (HCC): Primary | ICD-10-CM

## 2019-09-13 PROCEDURE — 99213 OFFICE O/P EST LOW 20 MIN: CPT | Performed by: INTERNAL MEDICINE

## 2019-09-13 PROCEDURE — 93280 PM DEVICE PROGR EVAL DUAL: CPT | Performed by: INTERNAL MEDICINE

## 2019-09-13 PROCEDURE — 93000 ELECTROCARDIOGRAM COMPLETE: CPT | Performed by: INTERNAL MEDICINE

## 2019-09-13 NOTE — PROGRESS NOTES
Dual Chamber Pacemaker Evaluation Report  IN OFFICE INTERROGATION    September 13, 2019    Primary Cardiologist: Kaylen  : Medtr[employer] Model: Revo MRI RVDR01  Implant date: 1/21/2013    Reason for evaluation: routine  Indication for pacemaker: sick sinus syndrome    Measurements  Atrial sensing - P wave: 4 mV  Atrial threshold: 0.5V@ 0.4ms  Atrial lead impedance: 552 ohms  Ventricular sensing - R wave: 8.8 mV  Ventricular threshold: 1 V @ 0.4 ms  Ventricular lead impedance:   488 ohms     Diagnostic Data  Atrial paced: 37 %  Ventricular paced: <0.1 %    Episodes recorded since 3/14/2019:  2 P-AT/AF episodes noted on cardiac compass.  [place] <0.1%.  Patient is not anticoagulated.     Battery status: satisfactory   2.96V      Final Parameters  Mode:  AAIR+  Lower rate: 60 bpm   Upper rate: 130 bpm  AV Delay: paced- 180 ms  Sensed-150 ms  Atrial - Amplitude: 1.5 V   Pulse width: 0.4 ms   Sensitivity: 0.3 mV     Ventricular - Amplitude: 2 V  Pulse width: 0.4 ms  Sensitivity: 0.9 mV    Changes made: Normal rajan atrial output changed to 1.5V  Conclusions: normal pacemaker function, stable pacing and sensing thresholds and adequate battery reserve    Follow up: 6 months in office

## 2019-09-13 NOTE — PROGRESS NOTES
Referring Provider: Florentin Costello MD    Reason for Follow-up Visit:Here for routine follow up   coronary artery disease   stented coronary artery   sick sinus syndrome s/p pacemaker      Subjective      No new events or complaints since last visit     Overall the patient feels no major change from baseline symptoms     Overall feeling well   No chest pain or shortness of breath     No palpitations  Less pedal edema    Compliant with medications and dietary advice  Decreased effort tolerance    No presyncope or syncope  Compliant with medications      Easy fatiguability     Joint pain in small, medium and large joints   Chronic low back pain      BP well controlled at home.       History of present illness:  Emiliano Recinos is a 89 y.o. yo female with history of coronary artery disease stented coronary artery who presents today for   Chief Complaint   Patient presents with   • Coronary Artery Disease     6 MOM FU    .    History  Past Medical History:   Diagnosis Date   • Bradycardia    • CAD in native artery    • Cardiac device in situ 03/29/2011    RCA stent   • Chest pain    • Diabetes mellitus type 2, uncomplicated (CMS/[facility])    • GERD (gastroesophageal reflux disease)    • Hard of hearing    • History of myocardial infarction 03/29/2011    EF 45% 2/11 cath   • Hyperlipemia, mixed    • Hypertension, benign    • Palpitation    • SSS (sick sinus syndrome) (CMS/[facility])    • Status cardiac pacemaker    • Syncope    ,   Past Surgical History:   Procedure Laterality Date   • BLADDER SUSPENSION      with hysterectomy   • CARDIAC CATHETERIZATION     • CORONARY STENT PLACEMENT      x 1   • HYSTERECTOMY      total   • INSERT / REPLACE / REMOVE PACEMAKER     • PACEMAKER IMPLANTATION     ,   Family History   Problem Relation Age of Onset   • Coronary artery disease Mother    • Heart attack Mother    ,   Social History     Tobacco Use   • Smoking status: Never Smoker   • Smokeless tobacco: Never Used   Substance Use  "Topics   • Alcohol use: No   • Drug use: No   ,     Medications  Current Outpatient Medications   Medication Sig Dispense Refill   • aspirin 81 MG tablet Take 81 mg by mouth daily.     • bumetanide (BUMEX) 1 MG tablet Take 1 tablet by mouth 2 (Two) Times a Day. 60 tablet 11   • carvedilol (COREG) 3.125 MG tablet TAKE (1) TABLET BY MOUTH TWICE A DAY. 180 tablet 3   • clopidogrel (PLAVIX) 75 MG tablet Take 75 mg by mouth daily.     • nitroglycerin (NITROSTAT) 0.4 MG SL tablet Place 0.4 mg under the tongue daily.     • omeprazole (PriLOSEC) 20 MG capsule Take 20 mg by mouth daily.     • Polyethylene Glycol 3350 (MIRALAX PO) Take  by mouth.     • rosuvastatin (CRESTOR) 10 MG tablet Take 10 mg by mouth. Monday, Wed, Fri       No current facility-administered medications for this visit.        Allergies:  Metoclopramide    Review of Systems  Review of Systems   Constitution: Positive for weakness and malaise/fatigue.   HENT: Negative.    Eyes: Negative.    Cardiovascular: Positive for leg swelling. Negative for chest pain, claudication, cyanosis, dyspnea on exertion, irregular heartbeat, near-syncope, orthopnea, palpitations, paroxysmal nocturnal dyspnea and syncope.   Respiratory: Negative.    Endocrine: Negative.    Hematologic/Lymphatic: Negative.    Skin: Negative.    Musculoskeletal: Positive for arthritis and joint pain.   Gastrointestinal: Negative for anorexia.   Genitourinary: Negative.    Psychiatric/Behavioral: Negative.        Objective     Physical Exam:  /60   Pulse 65   Ht 165.1 cm (65\")   Wt 62.6 kg (138 lb)   SpO2 98%   BMI 22.96 kg/m²   Physical Exam   Constitutional: She appears well-developed.   HENT:   Head: Normocephalic.   Neck: Normal carotid pulses and no JVD present. No tracheal tenderness present. Carotid bruit is not present. No tracheal deviation and no edema present.   Cardiovascular: Regular rhythm, normal heart sounds and normal pulses.   Pulmonary/Chest: Effort normal. No " stridor.   Abdominal: Soft. She exhibits no distension. There is no hepatosplenomegaly. There is no tenderness.   Neurological: She is alert. She has normal strength. No cranial nerve deficit or sensory deficit.   Skin: Skin is warm.   Psychiatric: She has a normal mood and affect. Her speech is normal and behavior is normal.       Results Review:       Results for orders placed during the hospital encounter of 07/26/18   Adult Transthoracic Echo Complete W/ Cont if Necessary Per Protocol    Narrative · Left ventricular systolic function is normal. Estimated EF = 60%.  · Left ventricular diastolic dysfunction.  · No evidence of pulmonary hypertension is present.      myocardial perfusion scan  3/17    · Myocardial perfusion imaging indicates a normal myocardial perfusion study with no evidence of ischemia.  · Impressions are consistent with a low risk study.  · Left ventricular ejection fraction is hyperdynamic (Calculated EF > 70%).  · Raw images reviewed with the following abnormalities noted: scanned with arms down.          ECG 12 Lead  Date/Time: 9/13/2019 1:40 PM  Performed by: Douglas Abdullahi MD  Authorized by: Douglas Abdullahi MD   Comparison: compared with previous ECG from 3/14/2019  Similar to previous ECG  Rhythm: sinus rhythm  Rate: normal  Conduction: conduction normal  Q waves: V1, V2 and V3    ST Segments: ST segments normal  T Waves: T waves normal  QRS axis: normal  Other: no other findings    Clinical impression: abnormal EKG            Assessment/Plan   Patient Active Problem List   Diagnosis   • CAD (coronary artery disease)   • SSS (sick sinus syndrome) (CMS/HCC)   • GERD (gastroesophageal reflux disease)   • HLD (hyperlipidemia)   • CAD in native artery   • Stented coronary artery   • Pedal edema   • NSVT (nonsustained ventricular tachycardia) (CMS/HCC)          Plan    Monitor cardiac rhythm device function regularly per established schedule or PRN     Weigh yourself frequently, at least weekly,  preferably daily, call me if more than 2 pounds a day or 5 pounds a week weight gain.  Flexible diuretic dosing       No additional cardiac testing required at this point in time.      ____________________________________________________________________________________________________________________________________________  Health maintenance and recommendations      Similar recommendations as last visit       Offered to give patient  a copy      Questions were encouraged, asked and answered to the patient's  understanding and satisfaction. Questions if any regarding current medications and side effects, need for refills and importance of compliance to medications stressed.    Reviewed available prior notes, consults, prior visits, laboratory findings, radiology and cardiology relevant reports. Updated chart as applicable. I have reviewed the patient's medical history in detail and updated the computerized patient record as relevant.      Updated patient regarding any new or relevant abnormalities on review of records or any new findings on physical exam. Mentioned to patient about purpose of visit and desirable health short and long term goals and objectives.    Primary to monitor CBC CMP Lipid panel and TSH as applicable    ___________________________________________________________________________________________________________________________________________         Return in about 6 months (around 3/13/2020).

## 2020-02-13 RX ORDER — BUMETANIDE 1 MG/1
TABLET ORAL
Qty: 180 TABLET | Refills: 4 | Status: ON HOLD | OUTPATIENT
Start: 2020-02-13 | End: 2021-12-16

## 2020-03-13 ENCOUNTER — CLINICAL SUPPORT NO REQUIREMENTS (OUTPATIENT)
Dept: CARDIOLOGY | Facility: CLINIC | Age: 85
End: 2020-03-13

## 2020-03-13 ENCOUNTER — OFFICE VISIT (OUTPATIENT)
Dept: CARDIOLOGY | Facility: CLINIC | Age: 85
End: 2020-03-13

## 2020-03-13 VITALS
HEIGHT: 65 IN | BODY MASS INDEX: 22.49 KG/M2 | OXYGEN SATURATION: 99 % | HEART RATE: 63 BPM | SYSTOLIC BLOOD PRESSURE: 140 MMHG | WEIGHT: 135 LBS | DIASTOLIC BLOOD PRESSURE: 60 MMHG

## 2020-03-13 DIAGNOSIS — E78.2 MIXED HYPERLIPIDEMIA: ICD-10-CM

## 2020-03-13 DIAGNOSIS — I25.10 CAD IN NATIVE ARTERY: Primary | ICD-10-CM

## 2020-03-13 DIAGNOSIS — I49.5 SSS (SICK SINUS SYNDROME) (HCC): ICD-10-CM

## 2020-03-13 DIAGNOSIS — K21.9 GASTROESOPHAGEAL REFLUX DISEASE, ESOPHAGITIS PRESENCE NOT SPECIFIED: ICD-10-CM

## 2020-03-13 DIAGNOSIS — I47.29 NSVT (NONSUSTAINED VENTRICULAR TACHYCARDIA) (HCC): ICD-10-CM

## 2020-03-13 DIAGNOSIS — Z95.5 STENTED CORONARY ARTERY: ICD-10-CM

## 2020-03-13 DIAGNOSIS — R60.0 PEDAL EDEMA: ICD-10-CM

## 2020-03-13 DIAGNOSIS — I25.10 CORONARY ARTERY DISEASE INVOLVING NATIVE CORONARY ARTERY OF NATIVE HEART WITHOUT ANGINA PECTORIS: ICD-10-CM

## 2020-03-13 DIAGNOSIS — Z95.0 PACEMAKER: Primary | ICD-10-CM

## 2020-03-13 PROCEDURE — 93000 ELECTROCARDIOGRAM COMPLETE: CPT | Performed by: INTERNAL MEDICINE

## 2020-03-13 PROCEDURE — 93288 INTERROG EVL PM/LDLS PM IP: CPT | Performed by: INTERNAL MEDICINE

## 2020-03-13 PROCEDURE — 99213 OFFICE O/P EST LOW 20 MIN: CPT | Performed by: INTERNAL MEDICINE

## 2020-03-13 RX ORDER — GLIPIZIDE 5 MG/1
1 TABLET ORAL
COMMUNITY
End: 2021-12-15

## 2020-03-13 NOTE — PROGRESS NOTES
Dual Chamber Pacemaker Evaluation Report  [facility]    March 13, 2020    Primary Cardiologist: Kaylen  : Medtronic Model: Revo MRI RVDR01  Implant date: 1/21/2013     Reason for evaluation: routine  Indication for pacemaker: sick sinus syndrome    Measurements  Atrial sensing - P wave: 2.1 mV  Atrial threshold: N/P  Atrial lead impedance: 424 ohms  Ventricular sensing - R wave: 5.8 mV  Ventricular threshold: N/P  Ventricular lead impedance:  424 ohms     Diagnostic Data  Atrial paced: 27.4 %  Ventricular paced: 72.6 %  Other: No new episodes  Battery status: satisfactory   2.96V      Final Parameters  Mode:  AAIR+  Lower rate: 60 bpm   Upper rate: 130 bpm  AV Delay: paced- 180 ms  Sensed-150 ms  Atrial - Amplitude: 1.5 V   Pulse width: 0.4 ms   Sensitivity: 0.3 mV     Ventricular - Amplitude: 2 V  Pulse width: 0.4 ms  Sensitivity: 0.9 mV    Changes made: None  Conclusions: normal pacemaker function, stable pacing and sensing thresholds and adequate battery reserve    Follow up: 6 months

## 2020-03-13 NOTE — PROGRESS NOTES
Referring Provider: Florentin Costello MD    Reason for Follow-up Visit:Here for routine follow up   coronary artery disease   stented coronary artery   sick sinus syndrome s/p pacemaker      Subjective        Overall feels well    No new events or complaints since last visit   Overall the patient feels no major change from baseline symptoms   Similar symptoms as during last visit     Tolerating current medications well with no untoward side effects   Compliant with prescribed medication regimen. Tries to adhere to cardiac diet.      Atypical fleeting non exertional chest pain that is focal and reproducible    No new events or complaints since last visit     Overall the patient feels no major change from baseline symptoms     Overall feeling well   No chest pain or shortness of breath     No palpitations  Less pedal edema    Compliant with medications and dietary advice  Decreased effort tolerance    No presyncope or syncope  Compliant with medications      Easy fatiguability     Joint pain in small, medium and large joints   Chronic low back pain      BP well controlled at home.       History of present illness:  Emiliano Recinos is a 89 y.o. yo female with history of coronary artery disease stented coronary artery who presents today for   Chief Complaint   Patient presents with   • Coronary Artery Disease     6 month follow up    .    History  Past Medical History:   Diagnosis Date   • Bradycardia    • CAD in native artery    • Cardiac device in situ 03/29/2011    RCA stent   • Chest pain    • Diabetes mellitus type 2, uncomplicated (CMS/HCC)    • GERD (gastroesophageal reflux disease)    • Hard of hearing    • History of myocardial infarction 03/29/2011    EF 45% 2/11 cath   • Hyperlipemia, mixed    • Hypertension, benign    • Palpitation    • SSS (sick sinus syndrome) (CMS/[facility])    • Status cardiac pacemaker    • Syncope    ,   Past Surgical History:   Procedure Laterality Date   • BLADDER SUSPENSION       with hysterectomy   • CARDIAC CATHETERIZATION     • CORONARY STENT PLACEMENT      x 1   • HYSTERECTOMY      total   • INSERT / REPLACE / REMOVE PACEMAKER     • PACEMAKER IMPLANTATION     ,   Family History   Problem Relation Age of Onset   • Coronary artery disease Mother    • Heart attack Mother    ,   Social History     Tobacco Use   • Smoking status: Never Smoker   • Smokeless tobacco: Never Used   Substance Use Topics   • Alcohol use: No   • Drug use: No   ,     Medications  Current Outpatient Medications   Medication Sig Dispense Refill   • aspirin 81 MG tablet Take 81 mg by mouth daily.     • carvedilol (COREG) 3.125 MG tablet TAKE (1) TABLET BY MOUTH TWICE A DAY. 180 tablet 3   • clopidogrel (PLAVIX) 75 MG tablet Take 75 mg by mouth daily.     • glipizide (GLUCOTROL) 5 MG tablet Take 1 mg by mouth 2 (Two) Times a Day Before Meals.     • nitroglycerin (NITROSTAT) 0.4 MG SL tablet Place 0.4 mg under the tongue daily.     • omeprazole (PriLOSEC) 20 MG capsule Take 20 mg by mouth daily.     • Polyethylene Glycol 3350 (MIRALAX PO) Take  by mouth.     • rosuvastatin (CRESTOR) 10 MG tablet Take 10 mg by mouth. Monday, Wed, Fri     • bumetanide (BUMEX) 1 MG tablet TAKE (1) TABLET BY MOUTH TWICE A DAY. 180 tablet 4     No current facility-administered medications for this visit.        Allergies:  Metoclopramide    Review of Systems  Review of Systems   Constitution: Positive for malaise/fatigue.   HENT: Negative.    Eyes: Negative.    Cardiovascular: Positive for leg swelling. Negative for chest pain, claudication, cyanosis, dyspnea on exertion, irregular heartbeat, near-syncope, orthopnea, palpitations, paroxysmal nocturnal dyspnea and syncope.   Respiratory: Negative.    Endocrine: Negative.    Hematologic/Lymphatic: Negative.    Skin: Negative.    Musculoskeletal: Positive for arthritis and joint pain.   Gastrointestinal: Negative for anorexia.   Genitourinary: Negative.    Neurological: Positive for weakness.  "  Psychiatric/Behavioral: Negative.        Objective     Physical Exam:  /60   Pulse 63   Ht 165.1 cm (65\")   Wt 61.2 kg (135 lb)   SpO2 99%   BMI 22.47 kg/m²   Physical Exam   Constitutional: She appears well-developed.   HENT:   Head: Normocephalic.   Neck: Normal carotid pulses and no JVD present. No tracheal tenderness present. Carotid bruit is not present. No tracheal deviation and no edema present.   Cardiovascular: Regular rhythm, normal heart sounds and normal pulses.   Pulmonary/Chest: Effort normal. No stridor.   Abdominal: Soft. She exhibits no distension. There is no hepatosplenomegaly. There is no tenderness.   Neurological: She is alert. She has normal strength. No cranial nerve deficit or sensory deficit.   Skin: Skin is warm.   Psychiatric: She has a normal mood and affect. Her speech is normal and behavior is normal.       Results Review:       Results for orders placed during the hospital encounter of 07/26/18   Adult Transthoracic Echo Complete W/ Cont if Necessary Per Protocol    Narrative · Left ventricular systolic function is normal. Estimated EF = 60%.  · Left ventricular diastolic dysfunction.  · No evidence of pulmonary hypertension is present.      myocardial perfusion scan  3/17    · Myocardial perfusion imaging indicates a normal myocardial perfusion study with no evidence of ischemia.  · Impressions are consistent with a low risk study.  · Left ventricular ejection fraction is hyperdynamic (Calculated EF > 70%).  · Raw images reviewed with the following abnormalities noted: scanned with arms down.          ECG 12 Lead  Date/Time: 3/13/2020 1:11 PM  Performed by: Douglas Abdullahi MD  Authorized by: Douglas Abdullahi MD   Comparison: compared with previous ECG from 9/13/2019  Similar to previous ECG  Comparison to previous ECG: Atrial pacing   Rhythm: paced  Rate: normal  Q waves: V1, V2 and V3    QRS axis: normal    Clinical impression: abnormal EKG            Assessment/Plan "   Patient Active Problem List   Diagnosis   • CAD (coronary artery disease)   • SSS (sick sinus syndrome) (CMS/[facility])   • GERD (gastroesophageal reflux disease)   • HLD (hyperlipidemia)   • CAD in native artery   • Stented coronary artery   • Pedal edema   • NSVT (nonsustained ventricular tachycardia) (CMS/[facility])          Plan    Monitor cardiac rhythm device function regularly per established schedule or PRN     Weigh yourself frequently, at least weekly, preferably daily, call me if more than 2 pounds a day or 5 pounds a week weight gain.  Flexible diuretic dosing     Conservative medical therapy per patient. Risks, benefits and alternatives explained. The patient understands and wishes to proceed with only conservative therapy.  The patient expressively declined any non-invasive/ invasive testing or treatment         ____________________________________________________________________________________________________________________________________________  Health maintenance and recommendations      Similar recommendations as last visit       Offered to give patient  a copy      Questions were encouraged, asked and answered to the patient's  understanding and satisfaction. Questions if any regarding current medications and side effects, need for refills and importance of compliance to medications stressed.    Reviewed available prior notes, consults, prior visits, laboratory findings, radiology and cardiology relevant reports. Updated chart as applicable. I have reviewed the patient's medical history in detail and updated the computerized patient record as relevant.      Updated patient regarding any new or relevant abnormalities on review of records or any new findings on physical exam. Mentioned to patient about purpose of visit and desirable health short and long term goals and objectives.    Primary to monitor CBC CMP Lipid panel and TSH as  applicable    ___________________________________________________________________________________________________________________________________________         Return in about 6 months (around 9/13/2020).

## 2020-03-15 NOTE — PROGRESS NOTES
I have reviewed the notes, assessments, and/or procedures performed by Monica Mendosa , I concur with her  documentation  of Emiliano Recinos.

## 2020-08-24 RX ORDER — CARVEDILOL 3.12 MG/1
TABLET ORAL
Qty: 180 TABLET | Refills: 4 | Status: SHIPPED | OUTPATIENT
Start: 2020-08-24 | End: 2021-11-05

## 2020-08-26 ENCOUNTER — HOSPITAL ENCOUNTER (OUTPATIENT)
Dept: ULTRASOUND IMAGING | Facility: HOSPITAL | Age: 85
Discharge: HOME | End: 2020-08-26
Admitting: NURSE PRACTITIONER

## 2020-08-26 ENCOUNTER — LAB (OUTPATIENT)
Dept: LAB | Facility: HOSPITAL | Age: 85
End: 2020-08-26

## 2020-08-26 ENCOUNTER — TRANSCRIBE ORDERS (OUTPATIENT)
Dept: ADMINISTRATIVE | Facility: HOSPITAL | Age: 85
End: 2020-08-26

## 2020-08-26 DIAGNOSIS — M79.605 PAIN IN LEFT LEG: ICD-10-CM

## 2020-08-26 DIAGNOSIS — R60.9 EDEMA, UNSPECIFIED TYPE: ICD-10-CM

## 2020-08-26 DIAGNOSIS — L98.9 DISORDER OF THE SKIN AND SUBCUTANEOUS TISSUE, UNSPECIFIED: ICD-10-CM

## 2020-08-26 DIAGNOSIS — M79.604 PAIN IN RIGHT LEG: ICD-10-CM

## 2020-08-26 DIAGNOSIS — L98.9 DISORDER OF THE SKIN AND SUBCUTANEOUS TISSUE, UNSPECIFIED: Primary | ICD-10-CM

## 2020-08-26 LAB
ALBUMIN SERPL-MCNC: 3.8 G/DL (ref 3.5–5)
ALBUMIN/GLOB SERPL: 1.3 G/DL (ref 1.1–2.5)
ALP SERPL-CCNC: 74 U/L (ref 24–120)
ALT SERPL W P-5'-P-CCNC: 26 U/L (ref 0–35)
ANION GAP SERPL CALCULATED.3IONS-SCNC: 6 MMOL/L (ref 4–13)
AST SERPL-CCNC: 37 U/L (ref 7–45)
AUTO MIXED CELLS #: 0.4 10*3/MM3 (ref 0.1–2.6)
AUTO MIXED CELLS %: 6.9 % (ref 0.1–24)
BILIRUB SERPL-MCNC: 0.7 MG/DL (ref 0.1–1)
BUN SERPL-MCNC: 17 MG/DL (ref 5–21)
BUN/CREAT SERPL: 27
CALCIUM SPEC-SCNC: 9.3 MG/DL (ref 8.4–10.4)
CHLORIDE SERPL-SCNC: 98 MMOL/L (ref 98–110)
CO2 SERPL-SCNC: 30 MMOL/L (ref 24–31)
CREAT SERPL-MCNC: 0.63 MG/DL (ref 0.5–1.4)
D DIMER PPP FEU-MCNC: 1.75 MG/L (FEU) (ref 0–0.5)
ERYTHROCYTE [DISTWIDTH] IN BLOOD BY AUTOMATED COUNT: 14 % (ref 12.3–15.4)
GFR SERPL CREATININE-BSD FRML MDRD: 87 ML/MIN/1.73
GLOBULIN UR ELPH-MCNC: 3 GM/DL
GLUCOSE SERPL-MCNC: 117 MG/DL (ref 70–100)
HCT VFR BLD AUTO: 34.8 % (ref 34–46.6)
HGB BLD-MCNC: 11.7 G/DL (ref 12–15.9)
LYMPHOCYTES # BLD AUTO: 1.6 10*3/MM3 (ref 0.7–3.1)
LYMPHOCYTES NFR BLD AUTO: 26.6 % (ref 19.6–45.3)
MCH RBC QN AUTO: 29.8 PG (ref 26.6–33)
MCHC RBC AUTO-ENTMCNC: 33.6 G/DL (ref 31.5–35.7)
MCV RBC AUTO: 88.8 FL (ref 79–97)
NEUTROPHILS NFR BLD AUTO: 4 10*3/MM3 (ref 1.7–7)
NEUTROPHILS NFR BLD AUTO: 66.5 % (ref 42.7–76)
PLATELET # BLD AUTO: 320 10*3/MM3 (ref 140–450)
PMV BLD AUTO: 8.1 FL (ref 6–12)
POTASSIUM SERPL-SCNC: 4.6 MMOL/L (ref 3.5–5.3)
PROT SERPL-MCNC: 6.8 G/DL (ref 6.3–8.7)
RBC # BLD AUTO: 3.92 10*6/MM3 (ref 3.77–5.28)
SODIUM SERPL-SCNC: 134 MMOL/L (ref 135–145)
WBC # BLD AUTO: 6 10*3/MM3 (ref 3.4–10.8)

## 2020-08-26 PROCEDURE — 36415 COLL VENOUS BLD VENIPUNCTURE: CPT | Performed by: NURSE PRACTITIONER

## 2020-08-26 PROCEDURE — 85025 COMPLETE CBC W/AUTO DIFF WBC: CPT

## 2020-08-26 PROCEDURE — 93970 EXTREMITY STUDY: CPT

## 2020-08-26 PROCEDURE — 85379 FIBRIN DEGRADATION QUANT: CPT | Performed by: NURSE PRACTITIONER

## 2020-08-26 PROCEDURE — 80053 COMPREHEN METABOLIC PANEL: CPT | Performed by: NURSE PRACTITIONER

## 2020-10-09 ENCOUNTER — OFFICE VISIT (OUTPATIENT)
Dept: CARDIOLOGY | Facility: CLINIC | Age: 85
End: 2020-10-09

## 2020-10-09 VITALS
BODY MASS INDEX: 23.32 KG/M2 | WEIGHT: 140 LBS | SYSTOLIC BLOOD PRESSURE: 140 MMHG | HEIGHT: 65 IN | DIASTOLIC BLOOD PRESSURE: 60 MMHG

## 2020-10-09 DIAGNOSIS — K21.9 GASTROESOPHAGEAL REFLUX DISEASE, UNSPECIFIED WHETHER ESOPHAGITIS PRESENT: Primary | ICD-10-CM

## 2020-10-09 DIAGNOSIS — R60.0 PEDAL EDEMA: ICD-10-CM

## 2020-10-09 DIAGNOSIS — I25.10 CAD IN NATIVE ARTERY: ICD-10-CM

## 2020-10-09 DIAGNOSIS — E78.2 MIXED HYPERLIPIDEMIA: ICD-10-CM

## 2020-10-09 DIAGNOSIS — Z95.5 STENTED CORONARY ARTERY: ICD-10-CM

## 2020-10-09 DIAGNOSIS — I25.10 CORONARY ARTERY DISEASE INVOLVING NATIVE CORONARY ARTERY OF NATIVE HEART WITHOUT ANGINA PECTORIS: ICD-10-CM

## 2020-10-09 DIAGNOSIS — I47.29 NSVT (NONSUSTAINED VENTRICULAR TACHYCARDIA) (HCC): ICD-10-CM

## 2020-10-09 DIAGNOSIS — R06.09 DOE (DYSPNEA ON EXERTION): ICD-10-CM

## 2020-10-09 DIAGNOSIS — I49.5 SSS (SICK SINUS SYNDROME) (HCC): ICD-10-CM

## 2020-10-09 PROCEDURE — 99214 OFFICE O/P EST MOD 30 MIN: CPT | Performed by: INTERNAL MEDICINE

## 2020-10-09 PROCEDURE — 93000 ELECTROCARDIOGRAM COMPLETE: CPT | Performed by: INTERNAL MEDICINE

## 2020-10-09 NOTE — PROGRESS NOTES
Referring Provider: Florentin Costello MD    Reason for Follow-up Visit:Here for routine follow up   coronary artery disease   stented coronary artery   sick sinus syndrome s/p pacemaker      Subjective    Overall feels the same   Intermittent pedal edema that responds to diuretics   No new events or complaints since last visit   Overall the patient feels no major change from baseline symptoms   Similar symptoms as during last visit     Tolerating current medications well with no untoward side effects   Compliant with prescribed medication regimen. Tries to adhere to cardiac diet.      No further chest pain   Some baseline shortness of breath     No palpitation  Decreased effort tolerance    No presyncope or syncope  Compliant with medications      Easy fatiguability     Joint pain in small, medium and large joints   Chronic low back pain      BP well controlled at home.       History of present illness:  Emiliano Recinos is a 89 y.o. yo female with history of coronary artery disease stented coronary artery who presents today for   Chief Complaint   Patient presents with   • Coronary Artery Disease     6 month follow up    .    History  Past Medical History:   Diagnosis Date   • Bradycardia    • CAD in native artery    • Cardiac device in situ 03/29/2011    RCA stent   • Chest pain    • Diabetes mellitus type 2, uncomplicated (CMS/HCC)    • GERD (gastroesophageal reflux disease)    • Hard of hearing    • History of myocardial infarction 03/29/2011    EF 45% 2/11 cath   • Hyperlipemia, mixed    • Hypertension, benign    • Palpitation    • SSS (sick sinus syndrome) (CMS/HCC)    • Status cardiac pacemaker    • Syncope    ,   Past Surgical History:   Procedure Laterality Date   • BLADDER SUSPENSION      with hysterectomy   • CARDIAC CATHETERIZATION     • CORONARY STENT PLACEMENT      x 1   • HYSTERECTOMY      total   • INSERT / REPLACE / REMOVE PACEMAKER     • PACEMAKER IMPLANTATION     ,   Family History   Problem  Postop  Birth Instructions    Activity       Do not lift more than 10 pounds for 6 weeks after surgery.  Ask family and friends for help when you need it.    No driving until you have stopped taking your pain medications (usually two weeks after surgery).    No heavy exercise or activity for 6 weeks.  Don't do anything that will put a strain on your surgery site.    Don't strain when using the toilet.  Your care team may prescribe a stool softener if you have problems with your bowel movements.     To care for your incision:       Keep the incision clean and dry.    Do not soak your incision in water. No swimming or hot tubs until it has fully healed. You may soak in the bathtub if the water level is below your incision.    Do not use peroxide, gel, cream, lotion, or ointment on your incision.    Adjust your clothes to avoid pressure on your surgery site (check the elastic in your underwear for example).     You may see a small amount of clear or pink drainage and this is normal.  Check with your health care provider:       If the drainage increases or has an odor.    If the incision reddens, you have swelling, or develop a rash.    If you have increased pain and the medicine we prescribed doesn't help.    If you have a fever above 100.4 F (38 C) with or without chills when placing thermometer under your tongue.   The area around your incision (surgery wound), will feel numb.  This is normal. The numbness should go away in less than a year.     Keep your hands clean:  Always wash your hands before touching your incision (surgery wound). This helps reduce your risk of infection. If your hands aren't dirty, you may use an alcohol hand-rub to clean your hands. Keep your nails clean and short.    Call your healthcare provider if you have any of these symptoms:       You soak a sanitary pad with blood within 1 hour, or you see blood clots larger than a golf ball.    Bleeding that lasts more than 6  "Relation Age of Onset   • Coronary artery disease Mother    • Heart attack Mother    ,   Social History     Tobacco Use   • Smoking status: Never Smoker   • Smokeless tobacco: Never Used   Substance Use Topics   • Alcohol use: No   • Drug use: No   ,     Medications  Current Outpatient Medications   Medication Sig Dispense Refill   • aspirin 81 MG tablet Take 81 mg by mouth daily.     • bumetanide (BUMEX) 1 MG tablet TAKE (1) TABLET BY MOUTH TWICE A DAY. 180 tablet 4   • carvedilol (COREG) 3.125 MG tablet TAKE (1) TABLET BY MOUTH TWICE A DAY. 180 tablet 4   • clopidogrel (PLAVIX) 75 MG tablet Take 75 mg by mouth daily.     • glipizide (GLUCOTROL) 5 MG tablet Take 1 mg by mouth 2 (Two) Times a Day Before Meals.     • nitroglycerin (NITROSTAT) 0.4 MG SL tablet Place 0.4 mg under the tongue daily.     • omeprazole (PriLOSEC) 20 MG capsule Take 20 mg by mouth daily.     • Polyethylene Glycol 3350 (MIRALAX PO) Take  by mouth.     • rosuvastatin (CRESTOR) 10 MG tablet Take 10 mg by mouth. Monday, Wed, Fri       No current facility-administered medications for this visit.        Allergies:  Metoclopramide    Review of Systems  Review of Systems   Constitution: Positive for malaise/fatigue.   HENT: Negative.    Eyes: Negative.    Cardiovascular: Positive for leg swelling. Negative for chest pain, claudication, cyanosis, dyspnea on exertion, irregular heartbeat, near-syncope, orthopnea, palpitations, paroxysmal nocturnal dyspnea and syncope.   Respiratory: Negative.    Endocrine: Negative.    Hematologic/Lymphatic: Negative.    Skin: Negative.    Musculoskeletal: Positive for arthritis and joint pain.   Gastrointestinal: Negative for anorexia.   Genitourinary: Negative.    Neurological: Positive for weakness.   Psychiatric/Behavioral: Negative.      Same review of system and physical exam as last visit      Objective     Physical Exam:  /60   Ht 165.1 cm (65\")   Wt 63.5 kg (140 lb)   BMI 23.30 kg/m²   Physical Exam " weeks.    Vaginal discharge that smells bad.    Severe pain, cramping or tenderness in your lower belly area.    A need to urinate more frequently (use the toilet more often), more urgently (use the toilet very quickly), or it burns when you urinate.    Nausea and vomiting.    Redness, swelling or pain around a vein in your leg.    Problems breastfeeding or a red or painful area on your breast.    Chest pain and cough or are gasping for air.    Problems with coping with sadness, anxiety or depression. If you have concerns about hurting yourself or the baby, call your provider immediately.      You have questions or concerns after you return home.                   Constitutional: She appears well-developed.   HENT:   Head: Normocephalic.   Neck: Normal carotid pulses and no JVD present. No tracheal tenderness present. Carotid bruit is not present. No tracheal deviation and no edema present.   Cardiovascular: Regular rhythm, normal heart sounds and normal pulses.   Pulmonary/Chest: Effort normal. No stridor.   Abdominal: Soft. She exhibits no distension. There is no abdominal tenderness.   Neurological: She is alert. No cranial nerve deficit or sensory deficit.   Skin: Skin is warm.   Psychiatric: Her speech is normal and behavior is normal.       Results Review:       Results for orders placed during the hospital encounter of 07/26/18   Adult Transthoracic Echo Complete W/ Cont if Necessary Per Protocol    Narrative · Left ventricular systolic function is normal. Estimated EF = 60%.  · Left ventricular diastolic dysfunction.  · No evidence of pulmonary hypertension is present.      myocardial perfusion scan  3/17    · Myocardial perfusion imaging indicates a normal myocardial perfusion study with no evidence of ischemia.  · Impressions are consistent with a low risk study.  · Left ventricular ejection fraction is hyperdynamic (Calculated EF > 70%).  · Raw images reviewed with the following abnormalities noted: scanned with arms down.          ECG 12 Lead    Date/Time: 10/9/2020 12:11 PM  Performed by: Douglas Abdullahi MD  Authorized by: Douglas Abdullahi MD   Comparison: compared with previous ECG from 3/13/2020  Comparison to previous ECG:  sinus rhythm replaced Atrial pacing   Rhythm: sinus rhythm  Rate: normal  Conduction: conduction normal  Q waves: V1 and V2    ST Segments: ST segments normal  QRS axis: normal  Other findings: low voltage    Clinical impression: abnormal EKG          ____________________________________________________________________________________________________________________________________________  Health maintenance and recommendations  Similar  recommendations as last visit     Offered to give patient  a copy      Questions were encouraged, asked and answered to the patient's  understanding and satisfaction. Questions if any regarding current medications and side effects, need for refills and importance of compliance to medications stressed.    Reviewed available prior notes, consults, prior visits, laboratory findings, radiology and cardiology relevant reports. Updated chart as applicable. I have reviewed the patient's medical history in detail and updated the computerized patient record as relevant.      Updated patient regarding any new or relevant abnormalities on review of records or any new findings on physical exam. Mentioned to patient about purpose of visit and desirable health short and long term goals and objectives.    Primary to monitor CBC CMP Lipid panel and TSH as applicable    ___________________________________________________________________________________________________________________________________________        Assessment/Plan   Patient Active Problem List   Diagnosis   • CAD (coronary artery disease)   • SSS (sick sinus syndrome) (CMS/[facility])   • GERD (gastroesophageal reflux disease)   • HLD (hyperlipidemia)   • CAD in native artery   • Stented coronary artery   • Pedal edema   • NSVT (nonsustained ventricular tachycardia) (CMS/[facility])        Plan    Monitor cardiac rhythm device function regularly per established schedule or PRN     Weigh yourself frequently, at least weekly, preferably daily, call me if more than 2 pounds a day or 5 pounds a week weight gain.  Flexible diuretic dosing        Orders Placed This Encounter   Procedures   • ECG 12 Lead     This order was created via procedure documentation   • Adult Transthoracic Echo Complete W/ Cont if Necessary Per Protocol     Myocardial strain to be performed during echocardiogram as long as technically feasible     Standing Status:   Future     Standing Expiration Date:    10/9/2021     Order Specific Question:   Reason for exam?     Answer:   Dyspnea                    Return in about 6 months (around 4/9/2021).

## 2020-10-13 ENCOUNTER — OFFICE VISIT (OUTPATIENT)
Dept: OTOLARYNGOLOGY | Facility: CLINIC | Age: 85
End: 2020-10-13

## 2020-10-13 VITALS
HEIGHT: 65 IN | WEIGHT: 132.2 LBS | SYSTOLIC BLOOD PRESSURE: 151 MMHG | BODY MASS INDEX: 22.02 KG/M2 | DIASTOLIC BLOOD PRESSURE: 73 MMHG | HEART RATE: 81 BPM | TEMPERATURE: 97.5 F

## 2020-10-13 DIAGNOSIS — D48.9 NEOPLASM OF UNCERTAIN BEHAVIOR: Primary | ICD-10-CM

## 2020-10-13 PROCEDURE — 88305 TISSUE EXAM BY PATHOLOGIST: CPT | Performed by: NURSE PRACTITIONER

## 2020-10-13 PROCEDURE — 99203 OFFICE O/P NEW LOW 30 MIN: CPT | Performed by: NURSE PRACTITIONER

## 2020-10-13 PROCEDURE — 11104 PUNCH BX SKIN SINGLE LESION: CPT | Performed by: NURSE PRACTITIONER

## 2020-10-13 PROCEDURE — 11105 PUNCH BX SKIN EA SEP/ADDL: CPT | Performed by: NURSE PRACTITIONER

## 2020-10-13 NOTE — PROGRESS NOTES
Procedure   Procedures    Preprocedure diagnosis: A changing lesion of the left jawline (anterior)    Post procedure diagnosis: Same    Procedure: Punch biopsy    Details:  Patient consent was obtained.  The skin was cleansed with alcohol.  The skin was infiltrated with 1 mL of 1% lidocaine with 1-100,000 epinephrine.  After approximately 10 minutes, a 3 millimeter punch biopsy was taken by placing circular motion on the biopsy tool, the skin was elevated and clipped with iris scissors.  The specimen was sent in formalin.  The skin was closed using a simple 5-0 fast absorbing gut suture.  Antibiotic ointment was placed over the biopsy site.  The patient tolerated the procedure with minimal discomfort.          Preprocedure diagnosis: A changing lesion of the left jawline (posterior)    Post procedure diagnosis: Same    Procedure: Punch biopsy    Details:  Patient consent was obtained.  The skin was cleansed with alcohol.  The skin was infiltrated with 1 mL of 1% lidocaine with 1-100,000 epinephrine.  After approximately 10 minutes, a 3 millimeter punch biopsy was taken by placing circular motion on the biopsy tool, the skin was elevated and clipped with iris scissors.  The specimen was sent in formalin.  The skin was closed using a simple 5-0 fast absorbing gut suture.  Antibiotic ointment was placed over the biopsy site.  The patient tolerated the procedure with minimal discomfort.          Preprocedure diagnosis: A changing lesion of the right helical rim    Post procedure diagnosis: Same    Procedure: Punch biopsy    Details:  Patient consent was obtained.  The skin was cleansed with alcohol.  The skin was infiltrated with 1 mL of 1% lidocaine with 1-100,000 epinephrine.  After approximately 10 minutes, a 3 millimeter punch biopsy was taken by placing circular motion on the biopsy tool, the skin was elevated and clipped with iris scissors.  The specimen was sent in formalin.  The skin was closed using a simple  5-0 fast absorbing gut suture.  Antibiotic ointment was placed over the biopsy site.  The patient tolerated the procedure with minimal discomfort.        TESSA Collins  10/13/20  10:18 CDT

## 2020-10-13 NOTE — PROGRESS NOTES
PRIMARY CARE PROVIDER: Florentin Costello MD  REFERRING PROVIDER: No ref. provider found    Chief Complaint   Patient presents with   • Skin Lesion       Subjective   History of Present Illness:  Emiliano Recinos is a  89 y.o. female who presents for consultation regarding multiple skin lesions. The lesions have the following characteristics:    Location: left jawline (anterior)  Quality: bleeding, tender, won't heal  Severity: moderate  Duration: couple years  Timing: constant  Modifying Factors: has been treated with cryotherapy x 2 in the past and has recurred  Associated Signs & Symptoms: no enlargement, no change in color, no lymphadenopathy    Location: left jawline (posterior)  Quality: bleeding, tender, won't heal  Severity: moderate  Duration: couple years  Timing: constant  Modifying Factors: has been treated with cryotherapy x 2 in the past and has recurred  Associated Signs & Symptoms: no enlargement, no change in color, no lymphadenopathy    Location: right helical rim  Quality: bleeding, tender  Severity: mild  Duration: several weeks  Timing: constant  Context: history of an excision of a NMSC in this area in 2011 by Dr. Moran   Modifying Factors: none  Associated Signs & Symptoms: no enlargement, no change in color, no lymphadeopathy    She is currently taking aspirin and Plavix-history of pacemaker and stent.  Her cardiologist is Dr. Abdullahi.    Review of Systems:  Review of Systems   Constitutional: Negative for chills and fever.   HENT: Negative for congestion, ear discharge, ear pain, rhinorrhea and sore throat.    Respiratory: Negative for cough and shortness of breath.    Cardiovascular: Negative for chest pain.   Gastrointestinal: Negative for diarrhea, nausea and vomiting.   Musculoskeletal: Positive for arthralgias and gait problem.   Hematological: Bruises/bleeds easily.       Past History:  Past Medical History:   Diagnosis Date   • Bradycardia    • CAD in native artery    • Cardiac device  in situ 03/29/2011    RCA stent   • Chest pain    • Diabetes mellitus type 2, uncomplicated (CMS/[facility])    • GERD (gastroesophageal reflux disease)    • Hard of hearing    • History of myocardial infarction 03/29/2011    EF 45% 2/11 cath   • Hyperlipemia, mixed    • Hypertension, benign    • Palpitation    • SSS (sick sinus syndrome) (CMS/[facility])    • Status cardiac pacemaker    • Syncope      Past Surgical History:   Procedure Laterality Date   • BLADDER SUSPENSION      with hysterectomy   • CARDIAC CATHETERIZATION     • CORONARY STENT PLACEMENT      x 1   • HYSTERECTOMY      total   • INSERT / REPLACE / REMOVE PACEMAKER     • PACEMAKER IMPLANTATION       Family History   Problem Relation Age of Onset   • Coronary artery disease Mother    • Heart attack Mother      Social History     Tobacco Use   • Smoking status: Never Smoker   • Smokeless tobacco: Never Used   Substance Use Topics   • Alcohol use: No   • Drug use: No     Allergies:  Metoclopramide    Current Outpatient Medications:   •  aspirin 81 MG tablet, Take 81 mg by mouth daily., Disp: , Rfl:   •  bumetanide (BUMEX) 1 MG tablet, TAKE (1) TABLET BY MOUTH TWICE A DAY., Disp: 180 tablet, Rfl: 4  •  carvedilol (COREG) 3.125 MG tablet, TAKE (1) TABLET BY MOUTH TWICE A DAY., Disp: 180 tablet, Rfl: 4  •  clopidogrel (PLAVIX) 75 MG tablet, Take 75 mg by mouth daily., Disp: , Rfl:   •  glipizide (GLUCOTROL) 5 MG tablet, Take 1 mg by mouth 2 (Two) Times a Day Before Meals., Disp: , Rfl:   •  nitroglycerin (NITROSTAT) 0.4 MG SL tablet, Place 0.4 mg under the tongue daily., Disp: , Rfl:   •  omeprazole (PriLOSEC) 20 MG capsule, Take 20 mg by mouth daily., Disp: , Rfl:   •  Polyethylene Glycol 3350 (MIRALAX PO), Take  by mouth., Disp: , Rfl:   •  rosuvastatin (CRESTOR) 10 MG tablet, Take 10 mg by mouth. Monday, Wed, Fri, Disp: , Rfl:     Objective     Vital Signs:  Temp:  [97.5 °F (36.4 °C)] 97.5 °F (36.4 °C)  Heart Rate:  [81] 81  BP: (151)/(73) 151/73    Physical  Exam:  Physical Exam  Vitals signs and nursing note reviewed.   Constitutional:       General: She is not in acute distress.     Appearance: Normal appearance. She is well-developed. She is not diaphoretic.   HENT:      Head: Normocephalic and atraumatic.        Right Ear: External ear normal.      Left Ear: External ear normal.      Ears:        Nose: Nose normal.   Eyes:      General: No scleral icterus.        Right eye: No discharge.         Left eye: No discharge.      Conjunctiva/sclera: Conjunctivae normal.      Pupils: Pupils are equal, round, and reactive to light.   Neck:      Musculoskeletal: Normal range of motion and neck supple.      Thyroid: No thyromegaly.      Vascular: No JVD.      Trachea: No tracheal deviation.   Pulmonary:      Effort: Pulmonary effort is normal.      Breath sounds: No stridor.   Musculoskeletal: Normal range of motion.         General: No deformity.   Lymphadenopathy:      Cervical: No cervical adenopathy.   Skin:     General: Skin is warm and dry.      Coloration: Skin is not pale.      Findings: No erythema or rash.   Neurological:      Mental Status: She is alert and oriented to person, place, and time.      Cranial Nerves: No cranial nerve deficit.      Coordination: Coordination normal.   Psychiatric:         Speech: Speech normal.         Behavior: Behavior normal. Behavior is cooperative.         Thought Content: Thought content normal.         Judgment: Judgment normal.               Assessment   Assessment:  1. Neoplasm of uncertain behavior        Plan   Plan:    I have offered biopsy of the lesions today in the office. See procedure note.  Patient tolerated procedure well.  Biopsy care instructions were given.    The risks, benefits, and alternatives of the procedure including but not limited to pain, scarring, bleeding, infection, persistent symptoms, and risks of the anesthesia were discussed full with the patient. Need for further therapy, depending on the  pathologic outcome, was discussed. Questions were answered. No guarantees were made or implied.      If lesions are malignant, I have offered excision of the skin lesions of the left jawline x 2 and right helical rim in the office with frozen section analysis under local anesthesia.    Discussion of skin lesion. Discussed risks, benefits, alternatives, and possible complications of excision of the skin lesion with reconstruction utilizing local tissue rearrangement, full-thickness skin grafting, or local interpolated flaps. Risks include, but are not limited too: bleeding, infection, hematoma, recurrence, need for additional procedures, flap failure, cosmetic deformity. Patient understands risks and would like to proceed with surgery.     My findings and recommendations were discussed and questions were answered.     Analisa Taylor, APRN  10/13/20  12:47 CDT

## 2020-10-16 LAB
LAB AP CASE REPORT: NORMAL
LAB AP CLINICAL INFORMATION: NORMAL
LAB AP DIAGNOSIS COMMENT: NORMAL
PATH REPORT.FINAL DX SPEC: NORMAL
PATH REPORT.GROSS SPEC: NORMAL

## 2020-11-04 ENCOUNTER — HOSPITAL ENCOUNTER (OUTPATIENT)
Dept: CARDIOLOGY | Facility: HOSPITAL | Age: 85
Discharge: HOME | End: 2020-11-04
Admitting: INTERNAL MEDICINE

## 2020-11-04 VITALS
HEIGHT: 65 IN | BODY MASS INDEX: 21.99 KG/M2 | WEIGHT: 132 LBS | SYSTOLIC BLOOD PRESSURE: 151 MMHG | DIASTOLIC BLOOD PRESSURE: 73 MMHG

## 2020-11-04 DIAGNOSIS — R06.09 DOE (DYSPNEA ON EXERTION): ICD-10-CM

## 2020-11-04 PROCEDURE — 93306 TTE W/DOPPLER COMPLETE: CPT

## 2020-11-04 PROCEDURE — 93306 TTE W/DOPPLER COMPLETE: CPT | Performed by: INTERNAL MEDICINE

## 2020-11-04 PROCEDURE — 93356 MYOCRD STRAIN IMG SPCKL TRCK: CPT

## 2020-11-04 PROCEDURE — 93356 MYOCRD STRAIN IMG SPCKL TRCK: CPT | Performed by: INTERNAL MEDICINE

## 2020-11-06 LAB
BH CV ECHO MEAS - AO MAX PG (FULL): 3.1 MMHG
BH CV ECHO MEAS - AO MAX PG: 7 MMHG
BH CV ECHO MEAS - AO MEAN PG (FULL): 1 MMHG
BH CV ECHO MEAS - AO MEAN PG: 3 MMHG
BH CV ECHO MEAS - AO ROOT AREA (BSA CORRECTED): 1.7
BH CV ECHO MEAS - AO ROOT AREA: 6.6 CM^2
BH CV ECHO MEAS - AO ROOT DIAM: 2.9 CM
BH CV ECHO MEAS - AO V2 MAX: 132 CM/SEC
BH CV ECHO MEAS - AO V2 MEAN: 85.3 CM/SEC
BH CV ECHO MEAS - AO V2 VTI: 30.2 CM
BH CV ECHO MEAS - AVA(I,A): 2.8 CM^2
BH CV ECHO MEAS - AVA(I,D): 2.8 CM^2
BH CV ECHO MEAS - AVA(V,A): 2.3 CM^2
BH CV ECHO MEAS - AVA(V,D): 2.3 CM^2
BH CV ECHO MEAS - BSA(HAYCOCK): 1.7 M^2
BH CV ECHO MEAS - BSA: 1.7 M^2
BH CV ECHO MEAS - BZI_BMI: 22 KILOGRAMS/M^2
BH CV ECHO MEAS - BZI_METRIC_HEIGHT: 165.1 CM
BH CV ECHO MEAS - BZI_METRIC_WEIGHT: 59.9 KG
BH CV ECHO MEAS - EDV(CUBED): 100.5 ML
BH CV ECHO MEAS - EDV(MOD-SP4): 50.4 ML
BH CV ECHO MEAS - EDV(TEICH): 99.8 ML
BH CV ECHO MEAS - EF(CUBED): 69.8 %
BH CV ECHO MEAS - EF(MOD-SP4): 61.5 %
BH CV ECHO MEAS - EF(TEICH): 61.4 %
BH CV ECHO MEAS - ESV(CUBED): 30.4 ML
BH CV ECHO MEAS - ESV(MOD-SP4): 19.4 ML
BH CV ECHO MEAS - ESV(TEICH): 38.5 ML
BH CV ECHO MEAS - FS: 32.9 %
BH CV ECHO MEAS - IVS/LVPW: 0.94
BH CV ECHO MEAS - IVSD: 1.1 CM
BH CV ECHO MEAS - LA DIMENSION: 47.3 CM
BH CV ECHO MEAS - LA/AO: 1.1
BH CV ECHO MEAS - LAT PEAK E' VEL: 5.6 CM/SEC
BH CV ECHO MEAS - LV DIASTOLIC VOL/BSA (35-75): 30.4 ML/M^2
BH CV ECHO MEAS - LV MASS(C)D: 187.9 GRAMS
BH CV ECHO MEAS - LV MASS(C)DI: 113.3 GRAMS/M^2
BH CV ECHO MEAS - LV MAX PG: 3.9 MMHG
BH CV ECHO MEAS - LV MEAN PG: 2 MMHG
BH CV ECHO MEAS - LV SYSTOLIC VOL/BSA (12-30): 11.7 ML/M^2
BH CV ECHO MEAS - LV V1 MAX: 98.3 CM/SEC
BH CV ECHO MEAS - LV V1 MEAN: 59.6 CM/SEC
BH CV ECHO MEAS - LV V1 VTI: 27.2 CM
BH CV ECHO MEAS - LVIDD: 4.7 CM
BH CV ECHO MEAS - LVIDS: 3.1 CM
BH CV ECHO MEAS - LVLD AP4: 6.5 CM
BH CV ECHO MEAS - LVLS AP4: 5.6 CM
BH CV ECHO MEAS - LVOT AREA (M): 3.1 CM^2
BH CV ECHO MEAS - LVOT AREA: 3.1 CM^2
BH CV ECHO MEAS - LVOT DIAM: 2 CM
BH CV ECHO MEAS - LVPWD: 1.2 CM
BH CV ECHO MEAS - MED PEAK E' VEL: 5 CM/SEC
BH CV ECHO MEAS - MV A MAX VEL: 121 CM/SEC
BH CV ECHO MEAS - MV DEC SLOPE: 489 CM/SEC^2
BH CV ECHO MEAS - MV DEC TIME: 0.21 SEC
BH CV ECHO MEAS - MV E MAX VEL: 86.3 CM/SEC
BH CV ECHO MEAS - MV E/A: 0.71
BH CV ECHO MEAS - MV P1/2T MAX VEL: 140 CM/SEC
BH CV ECHO MEAS - MV P1/2T: 83.9 MSEC
BH CV ECHO MEAS - MVA P1/2T LCG: 1.6 CM^2
BH CV ECHO MEAS - MVA(P1/2T): 2.6 CM^2
BH CV ECHO MEAS - PA MAX PG: 2.9 MMHG
BH CV ECHO MEAS - PA V2 MAX: 85.5 CM/SEC
BH CV ECHO MEAS - RAP SYSTOLE: 5 MMHG
BH CV ECHO MEAS - RVSP: 32.2 MMHG
BH CV ECHO MEAS - SI(AO): 120.3 ML/M^2
BH CV ECHO MEAS - SI(CUBED): 42.3 ML/M^2
BH CV ECHO MEAS - SI(LVOT): 51.5 ML/M^2
BH CV ECHO MEAS - SI(MOD-SP4): 18.7 ML/M^2
BH CV ECHO MEAS - SI(TEICH): 37 ML/M^2
BH CV ECHO MEAS - SV(AO): 199.5 ML
BH CV ECHO MEAS - SV(CUBED): 70.2 ML
BH CV ECHO MEAS - SV(LVOT): 85.5 ML
BH CV ECHO MEAS - SV(MOD-SP4): 31 ML
BH CV ECHO MEAS - SV(TEICH): 61.3 ML
BH CV ECHO MEAS - TR MAX VEL: 261 CM/SEC
BH CV ECHO MEASUREMENTS AVERAGE E/E' RATIO: 16.28
LEFT ATRIUM VOLUME INDEX: 39.2 ML/M2
MAXIMAL PREDICTED HEART RATE: 123 BPM
STRESS TARGET HR: 105 BPM

## 2020-12-01 ENCOUNTER — OFFICE VISIT (OUTPATIENT)
Dept: OTOLARYNGOLOGY | Facility: CLINIC | Age: 85
End: 2020-12-01

## 2020-12-01 VITALS
HEIGHT: 65 IN | DIASTOLIC BLOOD PRESSURE: 73 MMHG | HEART RATE: 83 BPM | BODY MASS INDEX: 21.99 KG/M2 | WEIGHT: 132 LBS | SYSTOLIC BLOOD PRESSURE: 106 MMHG | TEMPERATURE: 97.3 F

## 2020-12-01 DIAGNOSIS — L82.0 SEBORRHEIC KERATOSES, INFLAMED: Primary | ICD-10-CM

## 2020-12-01 DIAGNOSIS — L57.0 ACTINIC KERATOSIS: ICD-10-CM

## 2020-12-01 PROCEDURE — 17110 DESTRUCTION B9 LES UP TO 14: CPT | Performed by: NURSE PRACTITIONER

## 2020-12-01 NOTE — PROGRESS NOTES
PRIMARY CARE PROVIDER: Florentin Costello MD  REFERRING PROVIDER: No ref. provider found    Chief Complaint   Patient presents with   • Follow-up       Subjective   History of Present Illness:  Emiliano Recinos is a  89 y.o. female who presents for follow-up of multiple skin lesions. The lesions have the following characteristics:    Location: left jawline (anterior)  Quality: bleeding, tender, won't heal  Severity: moderate  Duration: couple years  Timing: constant  Modifying Factors: has been treated with cryotherapy x 2 in the past and has recurred  Associated Signs & Symptoms: no enlargement, no change in color, no lymphadenopathy  Biopsy performed on October 14, 2020 revealed seborrheic keratosis      Location: left jawline (posterior)  Quality: bleeding, tender, won't heal  Severity: moderate  Duration: couple years  Timing: constant  Modifying Factors: has been treated with cryotherapy x 2 in the past and has recurred  Associated Signs & Symptoms: no enlargement, no change in color, no lymphadenopathy  Biopsy performed October 14, 2020 revealed seborrheic keratosis      Location: right helical rim  Quality: bleeding, tender  Severity: mild  Duration: several weeks  Timing: constant  Context: history of an excision of a NMSC in this area in 2011 by Dr. Moran   Modifying Factors: none  Associated Signs & Symptoms: no enlargement, no change in color, no lymphadenopathy  Biopsy performed on October 14, 2020 revealed actinic keratosis      Today, she also points out a raised, flesh-colored lesion on the right neck and left dorsal hand.  The lesions have been present for the last several months.  They are mild in severity.  Nothing seems to make this better or worse.  They are not painful.  They do not bleed.  She reports she tends to pick at these areas.    She is currently taking aspirin and Plavix-history of pacemaker and stent.  Her cardiologist is Dr. Abdullahi.    Review of Systems:  Review of Systems    Constitutional: Negative for chills and fever.   HENT: Negative for congestion, ear discharge, ear pain, rhinorrhea and sore throat.    Respiratory: Negative for cough and shortness of breath.    Cardiovascular: Negative for chest pain.   Gastrointestinal: Negative for diarrhea, nausea and vomiting.   Musculoskeletal: Positive for arthralgias and gait problem.   Hematological: Bruises/bleeds easily.       Past History:  Past Medical History:   Diagnosis Date   • Bradycardia    • CAD in native artery    • Cardiac device in situ 03/29/2011    RCA stent   • Chest pain    • Diabetes mellitus type 2, uncomplicated (CMS/[facility])    • GERD (gastroesophageal reflux disease)    • Hard of hearing    • History of myocardial infarction 03/29/2011    EF 45% 2/11 cath   • Hyperlipemia, mixed    • Hypertension, benign    • Palpitation    • SSS (sick sinus syndrome) (CMS/[facility])    • Status cardiac pacemaker    • Syncope      Past Surgical History:   Procedure Laterality Date   • BLADDER SUSPENSION      with hysterectomy   • CARDIAC CATHETERIZATION     • CORONARY STENT PLACEMENT      x 1   • HYSTERECTOMY      total   • INSERT / REPLACE / REMOVE PACEMAKER     • PACEMAKER IMPLANTATION       Family History   Problem Relation Age of Onset   • Coronary artery disease Mother    • Heart attack Mother      Social History     Tobacco Use   • Smoking status: Never Smoker   • Smokeless tobacco: Never Used   Substance Use Topics   • Alcohol use: No   • Drug use: No     Allergies:  Metoclopramide    Current Outpatient Medications:   •  aspirin 81 MG tablet, Take 81 mg by mouth daily., Disp: , Rfl:   •  bumetanide (BUMEX) 1 MG tablet, TAKE (1) TABLET BY MOUTH TWICE A DAY., Disp: 180 tablet, Rfl: 4  •  carvedilol (COREG) 3.125 MG tablet, TAKE (1) TABLET BY MOUTH TWICE A DAY., Disp: 180 tablet, Rfl: 4  •  clopidogrel (PLAVIX) 75 MG tablet, Take 75 mg by mouth daily., Disp: , Rfl:   •  glipizide (GLUCOTROL) 5 MG tablet, Take 1 mg by mouth 2 (Two) Times  a Day Before Meals., Disp: , Rfl:   •  nitroglycerin (NITROSTAT) 0.4 MG SL tablet, Place 0.4 mg under the tongue daily., Disp: , Rfl:   •  omeprazole (PriLOSEC) 20 MG capsule, Take 20 mg by mouth daily., Disp: , Rfl:   •  Polyethylene Glycol 3350 (MIRALAX PO), Take  by mouth., Disp: , Rfl:   •  rosuvastatin (CRESTOR) 10 MG tablet, Take 10 mg by mouth. Monday, Wed, Fri, Disp: , Rfl:     Objective     Vital Signs:  Temp:  [97.3 °F (36.3 °C)] 97.3 °F (36.3 °C)  Heart Rate:  [83] 83  BP: (106)/(73) 106/73    Physical Exam:  Physical Exam  Vitals signs and nursing note reviewed.   Constitutional:       General: She is not in acute distress.     Appearance: Normal appearance. She is well-developed. She is not diaphoretic.   HENT:      Head: Normocephalic and atraumatic.        Right Ear: External ear normal.      Left Ear: External ear normal.      Ears:        Nose: Nose normal.   Eyes:      General: No scleral icterus.        Right eye: No discharge.         Left eye: No discharge.      Conjunctiva/sclera: Conjunctivae normal.      Pupils: Pupils are equal, round, and reactive to light.   Neck:      Musculoskeletal: Normal range of motion and neck supple.      Thyroid: No thyromegaly.      Vascular: No JVD.      Trachea: No tracheal deviation.   Pulmonary:      Effort: Pulmonary effort is normal.      Breath sounds: No stridor.   Musculoskeletal: Normal range of motion.         General: No deformity.   Lymphadenopathy:      Cervical: No cervical adenopathy.   Skin:     General: Skin is warm and dry.      Coloration: Skin is not pale.      Findings: No erythema or rash.          Neurological:      Mental Status: She is alert and oriented to person, place, and time.      Cranial Nerves: No cranial nerve deficit.      Coordination: Coordination normal.   Psychiatric:         Speech: Speech normal.         Behavior: Behavior normal. Behavior is cooperative.         Thought Content: Thought content normal.         Judgment:  Judgment normal.                 Results reviewed:        Assessment   Assessment:  1. Seborrheic keratoses, inflamed    2. Actinic keratosis        Plan   Plan:    Treatment options of actinic keratosis and inflamed seborrheic keratosis were discussed.  We have discussed treatment with cryotherapy.  We have also discussed treatment of the actinic keratosis with topical chemotherapy cream.  The patient has elected to to proceed and treat these lesions with cryotherapy-See procedure note.  We will follow-up in 3 months to reevaluate and assess for resolution.  The patient was instructed to call return for any problems worsening symptoms.    My findings and recommendations were discussed and questions were answered.     Analisa Taylor, TESSA  12/01/20  15:22 CST

## 2020-12-01 NOTE — PROGRESS NOTES
Procedure   Procedures    Preoperative Diagnosis: 1.) Seborrheic keratoses of left jawline (anterior and posterior)                                         2.) Seborrheic keratosis of right neck                                         3.) Seborrheic keratosis of left dorsal hand                                         4.)  Actinic keratosis of right helical rim    Postoperative Diagnosis: Same    Procedure: 1.) Destruction with Cryotherapy                      2.) Destruction with Cryotherapy                     3.) Destruction with Cryotherapy                      4.)  Destruction with cryotherapy    Provider: TESSA Christian    Anesthesia: None    Location: [facility] office    Complications: None    Details of Procedure:     Patient identity was verified and consent was signed. Lesion #1 was treated with 2 pulses of 6 second duration each; allowing the skin to completely thaw between pulses. Lesion #2 was treated with 2 pulses of 6 second duration each; allowing the skin to completely thaw between pulses. Lesion #3 was treated with 2 pulses of 6 second duration each; allowing the skin to completely thaw between pulses. The patient tolerated the procedure without complication. Lesion #4 was treated with 2 pulses of 6 second duration each; allowing the skin to completely thaw between pulses. The patient tolerated the procedure without complication.      TESSA Collins  12/01/20  15:29 CST

## 2021-02-12 ENCOUNTER — IMMUNIZATION (OUTPATIENT)
Dept: VACCINE CLINIC | Facility: HOSPITAL | Age: 86
End: 2021-02-12

## 2021-02-12 PROCEDURE — 0011A: CPT | Performed by: OBSTETRICS & GYNECOLOGY

## 2021-02-12 PROCEDURE — 91301 HC SARSCO02 VAC 100MCG/0.5ML IM: CPT | Performed by: OBSTETRICS & GYNECOLOGY

## 2021-03-03 ENCOUNTER — OFFICE VISIT (OUTPATIENT)
Dept: OTOLARYNGOLOGY | Facility: CLINIC | Age: 86
End: 2021-03-03

## 2021-03-03 VITALS — SYSTOLIC BLOOD PRESSURE: 138 MMHG | TEMPERATURE: 97.4 F | DIASTOLIC BLOOD PRESSURE: 63 MMHG | HEART RATE: 64 BPM

## 2021-03-03 DIAGNOSIS — L57.0 ACTINIC KERATOSIS: Primary | ICD-10-CM

## 2021-03-03 PROCEDURE — 99212 OFFICE O/P EST SF 10 MIN: CPT | Performed by: NURSE PRACTITIONER

## 2021-03-03 PROCEDURE — 17003 DESTRUCT PREMALG LES 2-14: CPT | Performed by: NURSE PRACTITIONER

## 2021-03-03 PROCEDURE — 17000 DESTRUCT PREMALG LESION: CPT | Performed by: NURSE PRACTITIONER

## 2021-03-12 ENCOUNTER — IMMUNIZATION (OUTPATIENT)
Dept: VACCINE CLINIC | Facility: HOSPITAL | Age: 86
End: 2021-03-12

## 2021-03-12 PROCEDURE — 91301 HC SARSCO02 VAC 100MCG/0.5ML IM: CPT | Performed by: OBSTETRICS & GYNECOLOGY

## 2021-03-12 PROCEDURE — 0012A: CPT | Performed by: OBSTETRICS & GYNECOLOGY

## 2021-03-22 NOTE — PROGRESS NOTES
PRIMARY CARE PROVIDER: Florentin Costello MD  REFERRING PROVIDER: No ref. provider found    Chief Complaint   Patient presents with   • Follow-up     3 month follow up       Subjective   History of Present Illness:  Emiliano Recinos is a  89 y.o. female who presents for follow-up of multiple skin lesions.  She was last seen on December 1, 2020 and cryotherapy was performed to lesions of the left jawline (anterior), left jawline (posterior), right helical rim, right neck, and left hand.  She reports these lesions have subsequently resolved.  Today, she complains of new lesions.  The lesions have the following characteristics:    Location: Left cheek x2  Quality: Rough, flaking, erythematous  Severity: Mild  Duration: Few months  Timing: constant  Modifying Factors: None  Associated Signs & Symptoms: no enlargement, no pain, no bleeding, no change in color, no lymphadenopathy      Location: Right dorsal hand  Quality: Rough, flaking, erythematous  Severity: Mild  Duration: Few months  Timing: constant  Modifying Factors: None  Associated Signs & Symptoms: no enlargement, no pain, no bleeding, no change in color, no lymphadenopathy    She is currently taking aspirin and Plavix-history of pacemaker and stent.  Her cardiologist is Dr. Abdullahi.    Review of Systems:  Review of Systems   Constitutional: Negative for chills and fever.   HENT: Negative for congestion, ear discharge, ear pain, rhinorrhea and sore throat.    Respiratory: Negative for cough and shortness of breath.    Cardiovascular: Negative for chest pain.   Gastrointestinal: Negative for diarrhea, nausea and vomiting.   Musculoskeletal: Positive for arthralgias and gait problem.   Hematological: Bruises/bleeds easily.       Past History:  Past Medical History:   Diagnosis Date   • Bradycardia    • CAD in native artery    • Cardiac device in situ 03/29/2011    RCA stent   • Chest pain    • Diabetes mellitus type 2, uncomplicated (CMS/[facility])    • GERD  (gastroesophageal reflux disease)    • Hard of hearing    • History of myocardial infarction 03/29/2011    EF 45% 2/11 cath   • Hyperlipemia, mixed    • Hypertension, benign    • Palpitation    • SSS (sick sinus syndrome) (CMS/[facility])    • Status cardiac pacemaker    • Syncope      Past Surgical History:   Procedure Laterality Date   • BLADDER SUSPENSION      with hysterectomy   • CARDIAC CATHETERIZATION     • CORONARY STENT PLACEMENT      x 1   • HYSTERECTOMY      total   • INSERT / REPLACE / REMOVE PACEMAKER     • PACEMAKER IMPLANTATION       Family History   Problem Relation Age of Onset   • Coronary artery disease Mother    • Heart attack Mother      Social History     Tobacco Use   • Smoking status: Never Smoker   • Smokeless tobacco: Never Used   Substance Use Topics   • Alcohol use: No   • Drug use: No     Allergies:  Metoclopramide    Current Outpatient Medications:   •  aspirin 81 MG tablet, Take 81 mg by mouth daily., Disp: , Rfl:   •  bumetanide (BUMEX) 1 MG tablet, TAKE (1) TABLET BY MOUTH TWICE A DAY., Disp: 180 tablet, Rfl: 4  •  carvedilol (COREG) 3.125 MG tablet, TAKE (1) TABLET BY MOUTH TWICE A DAY., Disp: 180 tablet, Rfl: 4  •  clopidogrel (PLAVIX) 75 MG tablet, Take 75 mg by mouth daily., Disp: , Rfl:   •  glipizide (GLUCOTROL) 5 MG tablet, Take 1 mg by mouth 2 (Two) Times a Day Before Meals., Disp: , Rfl:   •  nitroglycerin (NITROSTAT) 0.4 MG SL tablet, Place 0.4 mg under the tongue daily., Disp: , Rfl:   •  omeprazole (PriLOSEC) 20 MG capsule, Take 20 mg by mouth daily., Disp: , Rfl:   •  Polyethylene Glycol 3350 (MIRALAX PO), Take  by mouth., Disp: , Rfl:   •  rosuvastatin (CRESTOR) 10 MG tablet, Take 10 mg by mouth. Monday, Wed, Fri, Disp: , Rfl:     Objective     Vital Signs:    /63   Pulse 64   Temp 97.4 °F (36.3 °C) (Temporal)     Physical Exam:  Physical Exam  Vitals and nursing note reviewed.   Constitutional:       General: She is not in acute distress.     Appearance: Normal  appearance. She is well-developed. She is not diaphoretic.   HENT:      Head: Normocephalic and atraumatic.        Right Ear: External ear normal.      Left Ear: External ear normal.      Ears:        Nose: Nose normal.   Eyes:      General: No scleral icterus.        Right eye: No discharge.         Left eye: No discharge.      Conjunctiva/sclera: Conjunctivae normal.      Pupils: Pupils are equal, round, and reactive to light.   Neck:      Thyroid: No thyromegaly.      Vascular: No JVD.      Trachea: No tracheal deviation.   Pulmonary:      Effort: Pulmonary effort is normal.      Breath sounds: No stridor.   Musculoskeletal:         General: No deformity. Normal range of motion.      Cervical back: Normal range of motion and neck supple.   Lymphadenopathy:      Cervical: No cervical adenopathy.   Skin:     General: Skin is warm and dry.      Coloration: Skin is not pale.      Findings: No erythema or rash.          Neurological:      Mental Status: She is alert and oriented to person, place, and time.      Cranial Nerves: No cranial nerve deficit.      Coordination: Coordination normal.   Psychiatric:         Speech: Speech normal.         Behavior: Behavior normal. Behavior is cooperative.         Thought Content: Thought content normal.         Judgment: Judgment normal.       Results reviewed:        Assessment   Assessment:  1. Actinic keratosis        Plan   Plan:    Previous lesions that were treated with cryotherapy at her last visit have resolved.  We have discussed treatment options of actinic keratoses of the left cheek x2 and right dorsal hand  We have discussed treatment with cryotherapy.  We have also discussed treatment of the actinic keratosis with topical chemotherapy cream.  The patient has elected to to proceed and treat these lesions with cryotherapy-See procedure note.  We will follow-up in 3 months to reevaluate and assess for resolution.  The patient was instructed to call return for any  problems worsening symptoms.    My findings and recommendations were discussed and questions were answered.     Analisa Taylor, APRN

## 2021-03-22 NOTE — PROGRESS NOTES
Procedure   Procedures      Preoperative Diagnosis: 1.) Actinic keratosis of left cheek x 2                                         2.) Actinic keratosis of right dorsal hand    Postoperative Diagnosis: Same    Procedure: 1.) Destruction with Cryotherapy                     2.) Destruction with Cryotherapy     Provider: TESSA Christian    Anesthesia: None    Location: [facility] office    Complications: None    Details of Procedure:     Patient identity was verified and consent was signed. Lesion #1 was treated with 2 pulses of 6 second duration each; allowing the skin to completely thaw between pulses. Lesion #2 was treated with 2 pulses of 6 second duration each; allowing the skin to completely thaw between pulses. The patient tolerated the procedure without complication.    TESSA Collins

## 2021-04-13 ENCOUNTER — OFFICE VISIT (OUTPATIENT)
Dept: CARDIOLOGY | Facility: CLINIC | Age: 86
End: 2021-04-13

## 2021-04-13 ENCOUNTER — CLINICAL SUPPORT NO REQUIREMENTS (OUTPATIENT)
Dept: CARDIOLOGY | Facility: CLINIC | Age: 86
End: 2021-04-13

## 2021-04-13 VITALS
BODY MASS INDEX: 22.49 KG/M2 | OXYGEN SATURATION: 99 % | HEART RATE: 68 BPM | DIASTOLIC BLOOD PRESSURE: 70 MMHG | WEIGHT: 135 LBS | SYSTOLIC BLOOD PRESSURE: 120 MMHG | HEIGHT: 65 IN

## 2021-04-13 DIAGNOSIS — R60.0 PEDAL EDEMA: ICD-10-CM

## 2021-04-13 DIAGNOSIS — K21.9 GASTROESOPHAGEAL REFLUX DISEASE, UNSPECIFIED WHETHER ESOPHAGITIS PRESENT: ICD-10-CM

## 2021-04-13 DIAGNOSIS — I49.5 SSS (SICK SINUS SYNDROME) (HCC): ICD-10-CM

## 2021-04-13 DIAGNOSIS — E78.2 MIXED HYPERLIPIDEMIA: ICD-10-CM

## 2021-04-13 DIAGNOSIS — Z95.5 STENTED CORONARY ARTERY: ICD-10-CM

## 2021-04-13 DIAGNOSIS — I25.10 CORONARY ARTERY DISEASE INVOLVING NATIVE CORONARY ARTERY OF NATIVE HEART WITHOUT ANGINA PECTORIS: ICD-10-CM

## 2021-04-13 DIAGNOSIS — I47.29 NSVT (NONSUSTAINED VENTRICULAR TACHYCARDIA) (HCC): Primary | ICD-10-CM

## 2021-04-13 DIAGNOSIS — I25.10 CAD IN NATIVE ARTERY: ICD-10-CM

## 2021-04-13 PROCEDURE — 93000 ELECTROCARDIOGRAM COMPLETE: CPT | Performed by: INTERNAL MEDICINE

## 2021-04-13 PROCEDURE — 93288 INTERROG EVL PM/LDLS PM IP: CPT | Performed by: PHYSICIAN ASSISTANT

## 2021-04-13 PROCEDURE — 99214 OFFICE O/P EST MOD 30 MIN: CPT | Performed by: INTERNAL MEDICINE

## 2021-04-13 NOTE — PROGRESS NOTES
Dual Chamber Pacemaker Evaluation Report  Clinic Interrogation    April 13, 2021    Primary Cardiologist: Kaylen  : [employer] Model: Revo MRI RVDR01  Implant date: 1/21/13    Reason for evaluation: routine  Indication for pacemaker: sick sinus syndrome    Measurements  Atrial sensing - P wave: 2.3 mV  Atrial threshold: n/r  Atrial lead impedance: 416 ohms  Ventricular sensing - R wave: 8.8 mV  Ventricular threshold: n/r  Ventricular lead impedance:   440 ohms     Diagnostic Data  Atrial paced: 18.2 %  Ventricular paced: <0.1 %  Other: 1 sec run of NSVT  Battery status: satisfactory         Final Parameters  Mode:  AAIR+  Lower rate: 60 bpm   Upper rate: 130 bpm  AV Delay: paced- 180 ms  Sensed-150 ms  Atrial - Amplitude: 1.5 V   Pulse width: 0.4 ms   Sensitivity: 0.3 mV     Ventricular - Amplitude: 2.0 V  Pulse width: 0.4 ms  Sensitivity: 0.9 mV    Changes made: none  Conclusions: normal pacemaker function    Follow up: 6 months in office

## 2021-04-13 NOTE — PROGRESS NOTES
Referring Provider: Florentin Costello MD    Reason for Follow-up Visit:Here for routine follow up   coronary artery disease   stented coronary artery   sick sinus syndrome s/p pacemaker   Cardiac workup test results as below: echo, stress test     non sustained ventricular tachycardia that is brief on device check     Subjective      No new events or complaints since last visit   Overall feels the same   Intermittent mild pedal edema that responds to diuretics   No new events or complaints since last visit   Overall the patient feels no major change from baseline symptoms   Similar symptoms as during last visit     Tolerating current medications well with no untoward side effects   Compliant with prescribed medication regimen. Tries to adhere to cardiac diet.      No further chest pain   Some baseline shortness of breath     No palpitation  Decreased effort tolerance    No presyncope or syncope  Compliant with medications      Easy fatiguability     Joint pain in small, medium and large joints   Chronic low back pain      BP well controlled at home.     No bleeding, excessive bruising, gait instability or fall risks      Blood sugars not well controlled at home         History of present illness:  Emiliano Recinos is a 89 y.o. yo female with history of coronary artery disease stented coronary artery who presents today for   Chief Complaint   Patient presents with   • Coronary Artery Disease     6mo- no issues.    .    History  Past Medical History:   Diagnosis Date   • Bradycardia    • CAD in native artery    • Cardiac device in situ 03/29/2011    RCA stent   • Chest pain    • Diabetes mellitus type 2, uncomplicated (CMS/HCC)    • GERD (gastroesophageal reflux disease)    • Hard of hearing    • History of myocardial infarction 03/29/2011    EF 45% 2/11 cath   • Hyperlipemia, mixed    • Hypertension, benign    • Palpitation    • SSS (sick sinus syndrome) (CMS/[facility])    • Status cardiac pacemaker    • Syncope    ,    Past Surgical History:   Procedure Laterality Date   • BLADDER SUSPENSION      with hysterectomy   • CARDIAC CATHETERIZATION     • CORONARY STENT PLACEMENT      x 1   • HYSTERECTOMY      total   • INSERT / REPLACE / REMOVE PACEMAKER     • PACEMAKER IMPLANTATION     ,   Family History   Problem Relation Age of Onset   • Coronary artery disease Mother    • Heart attack Mother    ,   Social History     Tobacco Use   • Smoking status: Never Smoker   • Smokeless tobacco: Never Used   Vaping Use   • Vaping Use: Never used   Substance Use Topics   • Alcohol use: No   • Drug use: No   ,     Medications  Current Outpatient Medications   Medication Sig Dispense Refill   • aspirin 81 MG tablet Take 81 mg by mouth daily.     • bumetanide (BUMEX) 1 MG tablet TAKE (1) TABLET BY MOUTH TWICE A DAY. 180 tablet 4   • carvedilol (COREG) 3.125 MG tablet TAKE (1) TABLET BY MOUTH TWICE A DAY. 180 tablet 4   • clopidogrel (PLAVIX) 75 MG tablet Take 75 mg by mouth daily.     • glipizide (GLUCOTROL) 5 MG tablet Take 1 mg by mouth 2 (Two) Times a Day Before Meals.     • nitroglycerin (NITROSTAT) 0.4 MG SL tablet Place 0.4 mg under the tongue daily.     • omeprazole (PriLOSEC) 20 MG capsule Take 20 mg by mouth daily.     • Polyethylene Glycol 3350 (MIRALAX PO) Take  by mouth.     • rosuvastatin (CRESTOR) 10 MG tablet Take 10 mg by mouth. Monday, Wed, Fri       No current facility-administered medications for this visit.       Allergies:  Metoclopramide    Review of Systems  Review of Systems   Constitutional: Positive for malaise/fatigue.   HENT: Negative.    Eyes: Negative.    Cardiovascular: Positive for leg swelling. Negative for chest pain, claudication, cyanosis, dyspnea on exertion, irregular heartbeat, near-syncope, orthopnea, palpitations, paroxysmal nocturnal dyspnea and syncope.   Respiratory: Negative.    Endocrine: Negative.    Hematologic/Lymphatic: Negative.    Skin: Negative.    Musculoskeletal: Positive for arthritis and  "joint pain.   Gastrointestinal: Negative for anorexia.   Genitourinary: Negative.    Neurological: Positive for weakness.   Psychiatric/Behavioral: Negative.      Same review of system and physical exam as last visit      Objective     Physical Exam:  /70   Pulse 68   Ht 165.1 cm (65\")   Wt 61.2 kg (135 lb)   SpO2 99%   BMI 22.47 kg/m²   Physical Exam   Constitutional: She appears well-developed.   HENT:   Head: Normocephalic.   Neck: Normal carotid pulses and no JVD present. No tracheal tenderness present. Carotid bruit is not present. No tracheal deviation present.   Cardiovascular: Regular rhythm, normal heart sounds and normal pulses.   Pulmonary/Chest: Effort normal. No stridor.   Abdominal: Soft. She exhibits no distension. There is no abdominal tenderness.   Neurological: She is alert. No cranial nerve deficit or sensory deficit.   Skin: Skin is warm.   Psychiatric: Her speech is normal and behavior is normal.       Results Review:       Results for orders placed during the hospital encounter of 11/04/20    Adult Transthoracic Echo Complete W/ Cont if Necessary Per Protocol    Interpretation Summary  · Left ventricular ejection fraction appears to be 61 - 65%.  · Abnormal global longitudinal LV strain (GLS) = -13.7%.  · Left ventricular diastolic function is consistent with (grade I) impaired relaxation.  · No pulmonary hypertension  myocardial perfusion scan  3/17    · Myocardial perfusion imaging indicates a normal myocardial perfusion study with no evidence of ischemia.  · Impressions are consistent with a low risk study.  · Left ventricular ejection fraction is hyperdynamic (Calculated EF > 70%).  · Raw images reviewed with the following abnormalities noted: scanned with arms down.          ECG 12 Lead    Date/Time: 4/13/2021 10:59 AM  Performed by: Douglas Abdullahi MD  Authorized by: Douglas Abdullahi MD   Comparison: compared with previous ECG from 10/9/2020  Similar to previous ECG  Comparison to " previous ECG: Atrial pacing   Rhythm: paced  Rate: normal  Q waves: V1, V2 and V3      Clinical impression: abnormal EKG          ____________________________________________________________________________________________________________________________________________  Health maintenance and recommendations  Similar recommendations as last visit     Offered to give patient  a copy      Questions were encouraged, asked and answered to the patient's  understanding and satisfaction. Questions if any regarding current medications and side effects, need for refills and importance of compliance to medications stressed.    Reviewed available prior notes, consults, prior visits, laboratory findings, radiology and cardiology relevant reports. Updated chart as applicable. I have reviewed the patient's medical history in detail and updated the computerized patient record as relevant.      Updated patient regarding any new or relevant abnormalities on review of records or any new findings on physical exam. Mentioned to patient about purpose of visit and desirable health short and long term goals and objectives.    Primary to monitor CBC CMP Lipid panel and TSH as applicable    ___________________________________________________________________________________________________________________________________________        Assessment/Plan   Patient Active Problem List   Diagnosis   • CAD (coronary artery disease)   • SSS (sick sinus syndrome) (CMS/HCC)   • GERD (gastroesophageal reflux disease)   • HLD (hyperlipidemia)   • CAD in native artery   • Stented coronary artery   • Pedal edema   • NSVT (nonsustained ventricular tachycardia) (CMS/HCC)        Plan    Patient expressed understanding  Encouraged and answered all questions   Discussed with the patient and all questioned fully answered. She will call me if any problems arise.     Discussed results of prior testing with patient : echo   as well electrocardiogram from today     Monitor for any signs of bleeding including red or dark stools as well as easy bruisabilty. Fall precautions.       Weigh yourself frequently, at least weekly, preferably daily, call me if more than 2 pounds a day or 5 pounds a week weight gain.  Flexible diuretic dosing     Overall doing well no new cardiovascular symptoms and therefore no additional cardiac testing is required   If any interim issues arise will call me for further evaluation.     Continue beta blocker therapy     Monitor cardiac rhythm device function regularly per established schedule or PRN        I support the patient's decision to take the Covid -19 vaccine   Had both dose   No major issues   Now fully immunized        Follow up with TESSA Avila to address interim issues         Return in about 6 months (around 10/13/2021).

## 2021-04-19 NOTE — PROGRESS NOTES
Agree with assessment and plan of mid level provider as below with any changes if made as noted by Brianda Corona PA-C  of Emiliano Recinos.

## 2021-05-18 ENCOUNTER — OFFICE VISIT (OUTPATIENT)
Dept: WOUND CARE | Facility: HOSPITAL | Age: 86
End: 2021-05-18

## 2021-05-18 DIAGNOSIS — E11.628 TYPE 2 DIABETES MELLITUS WITH OTHER SKIN COMPLICATIONS (HCC): ICD-10-CM

## 2021-05-18 DIAGNOSIS — I10 ESSENTIAL (PRIMARY) HYPERTENSION: ICD-10-CM

## 2021-05-18 DIAGNOSIS — L97.522 NON-PRESSURE CHRONIC ULCER OF OTHER PART OF LEFT FOOT WITH FAT LAYER EXPOSED (HCC): ICD-10-CM

## 2021-05-18 DIAGNOSIS — E78.2 MIXED HYPERLIPIDEMIA: ICD-10-CM

## 2021-05-18 PROCEDURE — G0463 HOSPITAL OUTPT CLINIC VISIT: HCPCS

## 2021-05-18 PROCEDURE — 99202 OFFICE O/P NEW SF 15 MIN: CPT | Performed by: SURGERY

## 2021-05-18 PROCEDURE — 11042 DBRDMT SUBQ TIS 1ST 20SQCM/<: CPT | Performed by: SURGERY

## 2021-05-25 ENCOUNTER — APPOINTMENT (OUTPATIENT)
Dept: WOUND CARE | Facility: HOSPITAL | Age: 86
End: 2021-05-25

## 2021-05-27 ENCOUNTER — OFFICE VISIT (OUTPATIENT)
Dept: WOUND CARE | Facility: HOSPITAL | Age: 86
End: 2021-05-27

## 2021-05-27 DIAGNOSIS — L97.522 NON-PRESSURE CHRONIC ULCER OF OTHER PART OF LEFT FOOT WITH FAT LAYER EXPOSED (HCC): ICD-10-CM

## 2021-05-27 DIAGNOSIS — E11.628 TYPE 2 DIABETES MELLITUS WITH OTHER SKIN COMPLICATIONS (HCC): ICD-10-CM

## 2021-05-27 PROCEDURE — 99212 OFFICE O/P EST SF 10 MIN: CPT | Performed by: NURSE PRACTITIONER

## 2021-05-27 PROCEDURE — 11042 DBRDMT SUBQ TIS 1ST 20SQCM/<: CPT | Performed by: NURSE PRACTITIONER

## 2021-06-04 ENCOUNTER — HOSPITAL ENCOUNTER (OUTPATIENT)
Dept: GENERAL RADIOLOGY | Facility: HOSPITAL | Age: 86
Discharge: HOME | End: 2021-06-04

## 2021-06-04 ENCOUNTER — TRANSCRIBE ORDERS (OUTPATIENT)
Dept: ADMINISTRATIVE | Facility: HOSPITAL | Age: 86
End: 2021-06-04

## 2021-06-04 ENCOUNTER — OFFICE VISIT (OUTPATIENT)
Dept: WOUND CARE | Facility: HOSPITAL | Age: 86
End: 2021-06-04

## 2021-06-04 DIAGNOSIS — E11.628 TYPE 2 DIABETES MELLITUS WITH OTHER SKIN COMPLICATIONS (HCC): ICD-10-CM

## 2021-06-04 DIAGNOSIS — L97.522 NON-PRESSURE CHRONIC ULCER OF OTHER PART OF LEFT FOOT WITH FAT LAYER EXPOSED (HCC): ICD-10-CM

## 2021-06-04 DIAGNOSIS — M20.42 OTHER HAMMER TOE(S) (ACQUIRED), LEFT FOOT: ICD-10-CM

## 2021-06-04 DIAGNOSIS — E11.628 TYPE 2 DIABETES MELLITUS WITH OTHER SKIN COMPLICATIONS (HCC): Primary | ICD-10-CM

## 2021-06-04 PROCEDURE — 99213 OFFICE O/P EST LOW 20 MIN: CPT | Performed by: PODIATRIST

## 2021-06-04 PROCEDURE — 11042 DBRDMT SUBQ TIS 1ST 20SQCM/<: CPT | Performed by: PODIATRIST

## 2021-06-04 PROCEDURE — 73660 X-RAY EXAM OF TOE(S): CPT

## 2021-06-04 PROCEDURE — 28010 INCISION OF TOE TENDON: CPT | Performed by: PODIATRIST

## 2021-06-11 ENCOUNTER — OFFICE VISIT (OUTPATIENT)
Dept: WOUND CARE | Facility: HOSPITAL | Age: 86
End: 2021-06-11

## 2021-06-11 DIAGNOSIS — E11.628 TYPE 2 DIABETES MELLITUS WITH OTHER SKIN COMPLICATIONS (HCC): ICD-10-CM

## 2021-06-11 DIAGNOSIS — M20.42 OTHER HAMMER TOE(S) (ACQUIRED), LEFT FOOT: ICD-10-CM

## 2021-06-11 DIAGNOSIS — L97.522 NON-PRESSURE CHRONIC ULCER OF OTHER PART OF LEFT FOOT WITH FAT LAYER EXPOSED (HCC): ICD-10-CM

## 2021-06-11 PROCEDURE — 99024 POSTOP FOLLOW-UP VISIT: CPT | Performed by: PODIATRIST

## 2021-06-11 PROCEDURE — G0463 HOSPITAL OUTPT CLINIC VISIT: HCPCS

## 2021-08-31 ENCOUNTER — HOSPITAL ENCOUNTER (OUTPATIENT)
Dept: GENERAL RADIOLOGY | Facility: HOSPITAL | Age: 86
Discharge: HOME | End: 2021-08-31

## 2021-08-31 ENCOUNTER — HOSPITAL ENCOUNTER (OUTPATIENT)
Dept: ULTRASOUND IMAGING | Facility: HOSPITAL | Age: 86
Discharge: HOME | End: 2021-08-31

## 2021-08-31 ENCOUNTER — TRANSCRIBE ORDERS (OUTPATIENT)
Dept: ADMINISTRATIVE | Facility: HOSPITAL | Age: 86
End: 2021-08-31

## 2021-08-31 DIAGNOSIS — R79.89 OTHER SPECIFIED ABNORMAL FINDINGS OF BLOOD CHEMISTRY: Primary | ICD-10-CM

## 2021-08-31 DIAGNOSIS — R60.9 EDEMA, UNSPECIFIED TYPE: ICD-10-CM

## 2021-08-31 DIAGNOSIS — R79.89 OTHER SPECIFIED ABNORMAL FINDINGS OF BLOOD CHEMISTRY: ICD-10-CM

## 2021-08-31 DIAGNOSIS — M79.604 PAIN IN RIGHT LEG: ICD-10-CM

## 2021-08-31 PROCEDURE — 71046 X-RAY EXAM CHEST 2 VIEWS: CPT

## 2021-08-31 PROCEDURE — 93971 EXTREMITY STUDY: CPT

## 2021-11-06 RX ORDER — CARVEDILOL 3.12 MG/1
TABLET ORAL
Qty: 180 TABLET | Refills: 4 | Status: ON HOLD | OUTPATIENT
Start: 2021-11-06 | End: 2021-12-16

## 2021-11-19 ENCOUNTER — APPOINTMENT (OUTPATIENT)
Dept: ULTRASOUND IMAGING | Facility: HOSPITAL | Age: 86
End: 2021-11-19

## 2021-11-19 ENCOUNTER — CLINICAL SUPPORT NO REQUIREMENTS (OUTPATIENT)
Dept: CARDIOLOGY | Facility: CLINIC | Age: 86
End: 2021-11-19

## 2021-11-19 ENCOUNTER — HOSPITAL ENCOUNTER (EMERGENCY)
Facility: HOSPITAL | Age: 86
Discharge: HOME | End: 2021-11-19
Attending: EMERGENCY MEDICINE | Admitting: EMERGENCY MEDICINE

## 2021-11-19 ENCOUNTER — APPOINTMENT (OUTPATIENT)
Dept: GENERAL RADIOLOGY | Facility: HOSPITAL | Age: 86
End: 2021-11-19

## 2021-11-19 ENCOUNTER — OFFICE VISIT (OUTPATIENT)
Dept: CARDIOLOGY | Facility: CLINIC | Age: 86
End: 2021-11-19

## 2021-11-19 VITALS
TEMPERATURE: 98.8 F | WEIGHT: 135 LBS | SYSTOLIC BLOOD PRESSURE: 144 MMHG | BODY MASS INDEX: 22.49 KG/M2 | DIASTOLIC BLOOD PRESSURE: 56 MMHG | HEART RATE: 71 BPM | OXYGEN SATURATION: 100 % | HEIGHT: 65 IN | RESPIRATION RATE: 18 BRPM

## 2021-11-19 VITALS
HEART RATE: 72 BPM | HEIGHT: 65 IN | DIASTOLIC BLOOD PRESSURE: 64 MMHG | WEIGHT: 135 LBS | SYSTOLIC BLOOD PRESSURE: 128 MMHG | OXYGEN SATURATION: 97 % | BODY MASS INDEX: 22.49 KG/M2

## 2021-11-19 DIAGNOSIS — R60.0 PEDAL EDEMA: ICD-10-CM

## 2021-11-19 DIAGNOSIS — E78.2 MIXED HYPERLIPIDEMIA: ICD-10-CM

## 2021-11-19 DIAGNOSIS — I49.5 SSS (SICK SINUS SYNDROME) (HCC): ICD-10-CM

## 2021-11-19 DIAGNOSIS — R60.0 LOWER EXTREMITY EDEMA: Primary | ICD-10-CM

## 2021-11-19 DIAGNOSIS — I25.10 CORONARY ARTERY DISEASE INVOLVING NATIVE CORONARY ARTERY OF NATIVE HEART WITHOUT ANGINA PECTORIS: ICD-10-CM

## 2021-11-19 DIAGNOSIS — I47.29 NSVT (NONSUSTAINED VENTRICULAR TACHYCARDIA) (HCC): ICD-10-CM

## 2021-11-19 DIAGNOSIS — I49.5 SSS (SICK SINUS SYNDROME) (HCC): Primary | ICD-10-CM

## 2021-11-19 DIAGNOSIS — I25.10 CAD IN NATIVE ARTERY: ICD-10-CM

## 2021-11-19 DIAGNOSIS — Z95.5 STENTED CORONARY ARTERY: ICD-10-CM

## 2021-11-19 LAB
ALBUMIN SERPL-MCNC: 4.2 G/DL (ref 3.5–5.2)
ALBUMIN/GLOB SERPL: 1.3 G/DL
ALP SERPL-CCNC: 85 U/L (ref 39–117)
ALT SERPL W P-5'-P-CCNC: 18 U/L (ref 1–33)
ANION GAP SERPL CALCULATED.3IONS-SCNC: 9 MMOL/L (ref 5–15)
AST SERPL-CCNC: 19 U/L (ref 1–32)
BASOPHILS # BLD AUTO: 0.02 10*3/MM3 (ref 0–0.2)
BASOPHILS NFR BLD AUTO: 0.3 % (ref 0–1.5)
BILIRUB SERPL-MCNC: 0.7 MG/DL (ref 0–1.2)
BUN SERPL-MCNC: 26 MG/DL (ref 8–23)
BUN/CREAT SERPL: 51 (ref 7–25)
CALCIUM SPEC-SCNC: 9.8 MG/DL (ref 8.2–9.6)
CHLORIDE SERPL-SCNC: 98 MMOL/L (ref 98–107)
CO2 SERPL-SCNC: 31 MMOL/L (ref 22–29)
CREAT SERPL-MCNC: 0.51 MG/DL (ref 0.57–1)
DEPRECATED RDW RBC AUTO: 48.8 FL (ref 37–54)
EOSINOPHIL # BLD AUTO: 0.03 10*3/MM3 (ref 0–0.4)
EOSINOPHIL NFR BLD AUTO: 0.4 % (ref 0.3–6.2)
ERYTHROCYTE [DISTWIDTH] IN BLOOD BY AUTOMATED COUNT: 14.4 % (ref 12.3–15.4)
GFR SERPL CREATININE-BSD FRML MDRD: 111 ML/MIN/1.73
GLOBULIN UR ELPH-MCNC: 3.3 GM/DL
GLUCOSE SERPL-MCNC: 206 MG/DL (ref 65–99)
HCT VFR BLD AUTO: 37.2 % (ref 34–46.6)
HGB BLD-MCNC: 11.7 G/DL (ref 12–15.9)
HOLD SPECIMEN: NORMAL
IMM GRANULOCYTES # BLD AUTO: 0.03 10*3/MM3 (ref 0–0.05)
IMM GRANULOCYTES NFR BLD AUTO: 0.4 % (ref 0–0.5)
LYMPHOCYTES # BLD AUTO: 1.6 10*3/MM3 (ref 0.7–3.1)
LYMPHOCYTES NFR BLD AUTO: 21.4 % (ref 19.6–45.3)
MCH RBC QN AUTO: 28.7 PG (ref 26.6–33)
MCHC RBC AUTO-ENTMCNC: 31.5 G/DL (ref 31.5–35.7)
MCV RBC AUTO: 91.4 FL (ref 79–97)
MONOCYTES # BLD AUTO: 0.62 10*3/MM3 (ref 0.1–0.9)
MONOCYTES NFR BLD AUTO: 8.3 % (ref 5–12)
NEUTROPHILS NFR BLD AUTO: 5.16 10*3/MM3 (ref 1.7–7)
NEUTROPHILS NFR BLD AUTO: 69.2 % (ref 42.7–76)
NRBC BLD AUTO-RTO: 0 /100 WBC (ref 0–0.2)
NT-PROBNP SERPL-MCNC: 684.7 PG/ML (ref 0–1800)
PLATELET # BLD AUTO: 348 10*3/MM3 (ref 140–450)
PMV BLD AUTO: 9.4 FL (ref 6–12)
POTASSIUM SERPL-SCNC: 4 MMOL/L (ref 3.5–5.2)
PROT SERPL-MCNC: 7.5 G/DL (ref 6–8.5)
RBC # BLD AUTO: 4.07 10*6/MM3 (ref 3.77–5.28)
SODIUM SERPL-SCNC: 138 MMOL/L (ref 136–145)
WBC NRBC COR # BLD: 7.46 10*3/MM3 (ref 3.4–10.8)
WHOLE BLOOD HOLD SPECIMEN: NORMAL
WHOLE BLOOD HOLD SPECIMEN: NORMAL

## 2021-11-19 PROCEDURE — 96374 THER/PROPH/DIAG INJ IV PUSH: CPT

## 2021-11-19 PROCEDURE — 80053 COMPREHEN METABOLIC PANEL: CPT | Performed by: EMERGENCY MEDICINE

## 2021-11-19 PROCEDURE — 99283 EMERGENCY DEPT VISIT LOW MDM: CPT

## 2021-11-19 PROCEDURE — 93288 INTERROG EVL PM/LDLS PM IP: CPT | Performed by: PHYSICIAN ASSISTANT

## 2021-11-19 PROCEDURE — 83880 ASSAY OF NATRIURETIC PEPTIDE: CPT | Performed by: EMERGENCY MEDICINE

## 2021-11-19 PROCEDURE — 85025 COMPLETE CBC W/AUTO DIFF WBC: CPT | Performed by: EMERGENCY MEDICINE

## 2021-11-19 PROCEDURE — 93970 EXTREMITY STUDY: CPT | Performed by: SURGERY

## 2021-11-19 PROCEDURE — 99214 OFFICE O/P EST MOD 30 MIN: CPT | Performed by: INTERNAL MEDICINE

## 2021-11-19 PROCEDURE — 93005 ELECTROCARDIOGRAM TRACING: CPT | Performed by: EMERGENCY MEDICINE

## 2021-11-19 PROCEDURE — 25010000002 FUROSEMIDE PER 20 MG: Performed by: EMERGENCY MEDICINE

## 2021-11-19 PROCEDURE — 93010 ELECTROCARDIOGRAM REPORT: CPT | Performed by: EMERGENCY MEDICINE

## 2021-11-19 PROCEDURE — 93000 ELECTROCARDIOGRAM COMPLETE: CPT | Performed by: INTERNAL MEDICINE

## 2021-11-19 PROCEDURE — 25010000002 CHLOROTHIAZIDE PER 500 MG: Performed by: EMERGENCY MEDICINE

## 2021-11-19 PROCEDURE — 71045 X-RAY EXAM CHEST 1 VIEW: CPT

## 2021-11-19 PROCEDURE — 96375 TX/PRO/DX INJ NEW DRUG ADDON: CPT

## 2021-11-19 PROCEDURE — 93970 EXTREMITY STUDY: CPT

## 2021-11-19 RX ORDER — CHLOROTHIAZIDE SODIUM 500 MG/1
250 INJECTION INTRAVENOUS ONCE
Status: COMPLETED | OUTPATIENT
Start: 2021-11-19 | End: 2021-11-19

## 2021-11-19 RX ORDER — FUROSEMIDE 10 MG/ML
20 INJECTION INTRAMUSCULAR; INTRAVENOUS ONCE
Status: COMPLETED | OUTPATIENT
Start: 2021-11-19 | End: 2021-11-19

## 2021-11-19 RX ADMIN — FUROSEMIDE 20 MG: 10 INJECTION INTRAMUSCULAR; INTRAVENOUS at 13:57

## 2021-11-19 RX ADMIN — CHLOROTHIAZIDE SODIUM 250 MG: 500 INJECTION, POWDER, LYOPHILIZED, FOR SOLUTION INTRAVENOUS at 14:21

## 2021-11-19 NOTE — PROGRESS NOTES
Referring Provider: Florentin Costello MD    Reason for Follow-up Visit:Here for routine follow up   coronary artery disease   stented coronary artery   sick sinus syndrome s/p pacemaker   Cardiac workup test results as below: echo, stress test    non sustained ventricular tachycardia that is brief on device check in past     Subjective    Increasing pedal edema despite diuretic therapy  Massive pedal edema     Tolerating current medications well with no untoward side effects   Compliant with prescribed medication regimen. Tries to adhere to cardiac diet.      No further chest pain   Some baseline shortness of breath   No different now     No palpitation  Decreased effort tolerance    No presyncope or syncope  Compliant with medications      Easy fatiguability     Joint pain in small, medium and large joints   Chronic low back pain      BP well controlled at home.     No bleeding, excessive bruising, gait instability or fall risks           History of present illness:  Emiliano Recinos is a 89 y.o. yo female with coronary artery disease stented coronary artery who presents today for   Chief Complaint   Patient presents with   • Coronary Artery Disease   • SSS   .    History  Past Medical History:   Diagnosis Date   • Bradycardia    • CAD in native artery    • Cardiac device in situ 03/29/2011    RCA stent   • Chest pain    • Diabetes mellitus type 2, uncomplicated ([facility])    • GERD (gastroesophageal reflux disease)    • Hard of hearing    • History of myocardial infarction 03/29/2011    EF 45% 2/11 cath   • Hyperlipemia, mixed    • Hypertension, benign    • Palpitation    • SSS (sick sinus syndrome) ([facility])    • Status cardiac pacemaker    • Syncope    ,   Past Surgical History:   Procedure Laterality Date   • BLADDER SUSPENSION      with hysterectomy   • CARDIAC CATHETERIZATION     • CORONARY STENT PLACEMENT      x 1   • HYSTERECTOMY      total   • INSERT / REPLACE / REMOVE PACEMAKER     • PACEMAKER IMPLANTATION      ,   Family History   Problem Relation Age of Onset   • Coronary artery disease Mother    • Heart attack Mother    ,   Social History     Tobacco Use   • Smoking status: Never Smoker   • Smokeless tobacco: Never Used   Vaping Use   • Vaping Use: Never used   Substance Use Topics   • Alcohol use: No   • Drug use: No   ,     Medications  Current Outpatient Medications   Medication Sig Dispense Refill   • aspirin 81 MG tablet Take 81 mg by mouth daily.     • bumetanide (BUMEX) 1 MG tablet TAKE (1) TABLET BY MOUTH TWICE A DAY. 180 tablet 4   • carvedilol (COREG) 3.125 MG tablet TAKE (1) TABLET BY MOUTH TWICE A DAY. 180 tablet 4   • clopidogrel (PLAVIX) 75 MG tablet Take 75 mg by mouth daily.     • glipizide (GLUCOTROL) 5 MG tablet Take 1 mg by mouth 2 (Two) Times a Day Before Meals.     • nitroglycerin (NITROSTAT) 0.4 MG SL tablet Place 0.4 mg under the tongue daily.     • omeprazole (PriLOSEC) 20 MG capsule Take 20 mg by mouth daily.     • Polyethylene Glycol 3350 (MIRALAX PO) Take  by mouth.     • rosuvastatin (CRESTOR) 10 MG tablet Take 10 mg by mouth. Monday, Wed, Fri       No current facility-administered medications for this visit.       Allergies:  Metoclopramide    Review of Systems  Review of Systems   Constitutional: Positive for malaise/fatigue.   HENT: Negative.    Eyes: Negative.    Cardiovascular: Positive for leg swelling. Negative for chest pain, claudication, cyanosis, dyspnea on exertion, irregular heartbeat, near-syncope, orthopnea, palpitations, paroxysmal nocturnal dyspnea and syncope.   Respiratory: Negative.    Endocrine: Negative.    Hematologic/Lymphatic: Negative.    Skin: Negative.    Musculoskeletal: Positive for arthritis and joint pain.   Gastrointestinal: Negative for anorexia.   Genitourinary: Negative.    Neurological: Positive for weakness.   Psychiatric/Behavioral: Negative.      Same review of system and physical exam as last visit      Objective     Physical Exam:  /64    "Pulse 72   Ht 165.1 cm (65\")   Wt 61.2 kg (135 lb)   SpO2 97%   BMI 22.47 kg/m²   Physical Exam   Constitutional: She appears well-developed.   HENT:   Head: Normocephalic.   Neck: Normal carotid pulses and no JVD present. No tracheal tenderness present. Carotid bruit is not present. No tracheal deviation present.   Cardiovascular: Regular rhythm, normal heart sounds and normal pulses.   Pulmonary/Chest: Effort normal. No stridor.   Abdominal: Soft. She exhibits no distension. There is no abdominal tenderness.   Musculoskeletal:      Right lower le+ Edema present.      Left lower leg: 3+ Edema present.   Neurological: She is alert. No cranial nerve deficit or sensory deficit.   Skin: Skin is warm.   Psychiatric: Her speech is normal and behavior is normal.       Results Review:       Results for orders placed during the hospital encounter of 20    Adult Transthoracic Echo Complete W/ Cont if Necessary Per Protocol    Interpretation Summary  · Left ventricular ejection fraction appears to be 61 - 65%.  · Abnormal global longitudinal LV strain (GLS) = -13.7%.  · Left ventricular diastolic function is consistent with (grade I) impaired relaxation.  · No pulmonary hypertension  myocardial perfusion scan  3/17    · Myocardial perfusion imaging indicates a normal myocardial perfusion study with no evidence of ischemia.  · Impressions are consistent with a low risk study.  · Left ventricular ejection fraction is hyperdynamic (Calculated EF > 70%).  · Raw images reviewed with the following abnormalities noted: scanned with arms down.          ECG 12 Lead    Date/Time: 2021 11:09 AM  Performed by: Douglas Abdullahi MD  Authorized by: Douglas Abdullahi MD   Comparison: compared with previous ECG from 2021  Comparison to previous ECG:  sinus rhythm replaced atrial pacing   Rhythm: sinus rhythm  Rate: normal  Q waves: V1, V2 and V3    QRS axis: normal    Clinical impression: abnormal " EKG          ____________________________________________________________________________________________________________________________________________  Health maintenance and recommendations  Similar recommendations as last visit     Offered to give patient  a copy      Questions were encouraged, asked and answered to the patient's  understanding and satisfaction. Questions if any regarding current medications and side effects, need for refills and importance of compliance to medications stressed.    Reviewed available prior notes, consults, prior visits, laboratory findings, radiology and cardiology relevant reports. Updated chart as applicable. I have reviewed the patient's medical history in detail and updated the computerized patient record as relevant.      Updated patient regarding any new or relevant abnormalities on review of records or any new findings on physical exam. Mentioned to patient about purpose of visit and desirable health short and long term goals and objectives.    Primary to monitor CBC CMP Lipid panel and TSH as applicable    ___________________________________________________________________________________________________________________________________________      Diagnoses and all orders for this visit:    1. SSS (sick sinus syndrome) (HCC) (Primary)    2. Stented coronary artery    3. NSVT (nonsustained ventricular tachycardia) ([facility])    4. Pedal edema    5. Mixed hyperlipidemia    6. Coronary artery disease involving native coronary artery of native heart without angina pectoris    7. CAD in native artery    Other orders  -     ECG 12 Lead         Plan      Due to massive pedal edema will send to ER   Needs labs and possible IV diuretics     After discharge strict low sodium diet     I support the patient's decision to take the Covid -19 vaccine   Had required complete course   No major issues   Now fully immunized    Recommend booster       Follow up in 3 weeks after discharge    Follow up with TESSA Avila  or myself        Return in about 3 weeks (around 12/10/2021).

## 2021-11-19 NOTE — ED PROVIDER NOTES
Subjective   Sent by Dr. Abdullahi for lower extremity swelling the patient has got chronic lower extremity swelling right more than the left no shortness of breath no fever associate with this.      Leg Swelling  Location:  Lower extremity swelling  Severity:  Moderate  Onset quality:  Gradual  Timing:  Intermittent  Progression:  Waxing and waning  Chronicity:  New  Associated symptoms: no abdominal pain, no chest pain, no congestion, no diarrhea, no fever, no headaches, no loss of consciousness, no myalgias, no rhinorrhea, no shortness of breath, no sore throat, no vomiting and no wheezing        Review of Systems   Constitutional: Negative.  Negative for fever.   HENT: Negative.  Negative for congestion, rhinorrhea and sore throat.    Eyes: Negative.    Respiratory: Negative.  Negative for shortness of breath and wheezing.    Cardiovascular: Negative.  Negative for chest pain.   Gastrointestinal: Negative.  Negative for abdominal pain, diarrhea and vomiting.   Musculoskeletal: Negative.  Negative for back pain, myalgias and neck pain.   Skin: Negative.    Neurological: Negative.  Negative for loss of consciousness and headaches.   All other systems reviewed and are negative.      Past Medical History:   Diagnosis Date   • Bradycardia    • CAD in native artery    • Cardiac device in situ 03/29/2011    RCA stent   • Chest pain    • Diabetes mellitus type 2, uncomplicated ([facility])    • GERD (gastroesophageal reflux disease)    • Hard of hearing    • History of myocardial infarction 03/29/2011    EF 45% 2/11 cath   • Hyperlipemia, mixed    • Hypertension, benign    • Palpitation    • SSS (sick sinus syndrome) ([facility])    • Status cardiac pacemaker    • Syncope        Allergies   Allergen Reactions   • Metoclopramide Swelling     Legs swelled       Past Surgical History:   Procedure Laterality Date   • BLADDER SUSPENSION      with hysterectomy   • CARDIAC CATHETERIZATION     • CORONARY STENT PLACEMENT      x 1   • HYSTERECTOMY       total   • INSERT / REPLACE / REMOVE PACEMAKER     • PACEMAKER IMPLANTATION         Family History   Problem Relation Age of Onset   • Coronary artery disease Mother    • Heart attack Mother        Social History     Socioeconomic History   • Marital status:    Tobacco Use   • Smoking status: Never Smoker   • Smokeless tobacco: Never Used   Vaping Use   • Vaping Use: Never used   Substance and Sexual Activity   • Alcohol use: No   • Drug use: No   • Sexual activity: Defer           Objective   Physical Exam  Vitals and nursing note reviewed. Exam conducted with a chaperone present.   Constitutional:       General: She is not in acute distress.     Appearance: Normal appearance. She is well-developed and underweight. She is not toxic-appearing or diaphoretic.   HENT:      Head: Normocephalic and atraumatic.      Right Ear: External ear normal.      Nose: Nose normal.      Mouth/Throat:      Mouth: Mucous membranes are moist.   Eyes:      Conjunctiva/sclera: Conjunctivae normal.      Pupils: Pupils are equal, round, and reactive to light.   Cardiovascular:      Rate and Rhythm: Normal rate and regular rhythm.      Chest Wall: PMI is not displaced.      Pulses: Normal pulses. No decreased pulses.      Heart sounds: Murmur heard.    Systolic murmur is present with a grade of 1/6.      Pulmonary:      Effort: Pulmonary effort is normal. No tachypnea, accessory muscle usage or respiratory distress.      Breath sounds: Normal breath sounds. No stridor. No decreased breath sounds, wheezing, rhonchi or rales.   Chest:      Chest wall: No tenderness or crepitus.   Abdominal:      General: Bowel sounds are normal. There is no distension.      Palpations: Abdomen is soft.      Tenderness: There is no abdominal tenderness.   Musculoskeletal:         General: No swelling or tenderness. Normal range of motion.      Cervical back: Normal range of motion and neck supple. No rigidity.      Right lower leg: 3+ Edema present.       Left lower le+ Edema present.      Comments: Lower extremity exam bilaterally is unremarkable.  There is no right or left calf tenderness .  There is no palpable venous cord.  No obvious difference in the size of the legs.  No pitting edema.  The dorsalis pedis and posterior tibial femoral and popliteal pulses are palpable and +2 bilaterally.  Homans sign is negative   Skin:     General: Skin is warm.      Capillary Refill: Capillary refill takes less than 2 seconds.      Coloration: Skin is not jaundiced.      Findings: No erythema or rash.   Neurological:      General: No focal deficit present.      Mental Status: She is alert and oriented to person, place, and time. Mental status is at baseline.      Cranial Nerves: No cranial nerve deficit.      Motor: No weakness.      Coordination: Coordination normal.      Deep Tendon Reflexes: Reflexes are normal and symmetric. Reflexes normal.   Psychiatric:         Mood and Affect: Mood normal.         Procedures           ED Course  ED Course as of 21 1553      1412 Normal sinus rhythm [TS]   1551 Patient has chronic lower extremity edema came into the ER with the same no evidence of any CHF on clinical exam i.e. pulmonary edema etc. p.o. work-up was done on the patient sonogram is negative chest x-ray has chronic changes in the area of a possible infectious process is not related with any clinical findings and therefore it will not be treated we will discharge him home with a follow-up she is agreeable with this plan of care [TS]   1552 Patient was given 2 diuretics [TS]   1552 Risks and benefits of treatments given and alternative treatment options discussed with patient/family. I answered all the questions in simple, plain language, and there was voiced understanding and agreement with plan of care. There were no further questions. Differential diagnosis discussed. Patient/family was advised that the practice of medicine is not always an  exact science, and sometimes tests, physical exam, or history may not show the underlying conditions with certainty. Additionally, the condition may change or show itself later after initial presentation. There was also expressed understanding and agreement with this limitation of emergency medicine practice. Patient/family was asked to return to ED if any problem or issues or if condition worsens or does not improved. Patient/family agreed to follow up with PCP/specialist as advised, or return to ED if unable to see a provider in a timely fashion for continued symptoms.  [TS]      ED Course User Index  [TS] Robinson Michelle MD                                           MDM  Number of Diagnoses or Management Options  Diagnosis management comments: Lower extremity swelling       Amount and/or Complexity of Data Reviewed  Clinical lab tests: ordered and reviewed  Tests in the radiology section of CPT®: ordered and reviewed  Tests in the medicine section of CPT®: reviewed and ordered    Risk of Complications, Morbidity, and/or Mortality  Presenting problems: moderate  Diagnostic procedures: moderate  Management options: moderate        Final diagnoses:   Lower extremity edema       ED Disposition  ED Disposition     ED Disposition Condition Comment    Discharge Stable           Florentin Costello MD  [address] [id number] [phone]    Schedule an appointment as soon as possible for a visit            Medication List      No changes were made to your prescriptions during this visit.          Robinson Michelle MD  11/19/21 3705       Robinson Michelle MD  11/19/21 4642

## 2021-11-19 NOTE — ED NOTES
The following fall interventions were initiated:    [x] Patient and/or family given education   [x] Call light within reach and educated on how to use   [x] Bed rails up per protocol    [x] Bed locked and in the lowest position   [] Bed alarm set and on loudest setting   [x] Fall wrist band applied   [x] Non skid footwear applied   [x] Room free of clutter   [x] Patient items within reach   [x] Adequate lighting provided  [x] Falls sign present    [x] Patient moved closer to nursing station   [] Restraints applied        Angel Zamora, RN  11/19/21 9839

## 2021-11-19 NOTE — PROGRESS NOTES
Dual Chamber Pacemaker Evaluation Report  Clinic Interrogation    November 19, 2021    Primary Cardiologist: Kaylen  : Med[employer]onic Model: Revo MRI RVDR01  Implant date: 1/21/13    Reason for evaluation: routine  Indication for pacemaker: sick sinus syndrome    Measurements  Atrial sensing - P wave: 2.2 mV  Atrial threshold: n/r  Atrial lead impedance: 416 ohms  Ventricular sensing - R wave: 9.8 mV  Ventricular threshold: n/t  Ventricular lead impedance:   440 ohms     Diagnostic Data  Atrial paced: 11.6 %  Ventricular paced: <1 %  Other: no new events noted  Battery status: satisfactory         Final Parameters  Mode:  AAIR+  Lower rate: 60 bpm   Upper rate: 130 bpm  AV Delay: paced- 180 ms  Sensed-150 ms  Atrial - Amplitude: 1.5 V   Pulse width: 0.4 ms   Sensitivity: 0.3 mV     Ventricular - Amplitude: 2.0 V  Pulse width: 0.4 ms  Sensitivity: 0.9 mV    Changes made: none  Conclusions: normal pacemaker function    Follow up: 6 months in office

## 2021-11-21 LAB
QT INTERVAL: 420 MS
QTC INTERVAL: 440 MS

## 2021-12-15 ENCOUNTER — APPOINTMENT (OUTPATIENT)
Dept: GENERAL RADIOLOGY | Facility: HOSPITAL | Age: 86
End: 2021-12-15

## 2021-12-15 ENCOUNTER — APPOINTMENT (OUTPATIENT)
Dept: ULTRASOUND IMAGING | Facility: HOSPITAL | Age: 86
End: 2021-12-15

## 2021-12-15 ENCOUNTER — HOSPITAL ENCOUNTER (OUTPATIENT)
Facility: HOSPITAL | Age: 86
Setting detail: OBSERVATION
Discharge: HOME HEALTH | End: 2021-12-17
Attending: INTERNAL MEDICINE | Admitting: INTERNAL MEDICINE

## 2021-12-15 ENCOUNTER — OFFICE VISIT (OUTPATIENT)
Dept: CARDIOLOGY | Facility: CLINIC | Age: 86
End: 2021-12-15

## 2021-12-15 VITALS
HEIGHT: 65 IN | SYSTOLIC BLOOD PRESSURE: 148 MMHG | DIASTOLIC BLOOD PRESSURE: 70 MMHG | OXYGEN SATURATION: 98 % | WEIGHT: 138 LBS | BODY MASS INDEX: 22.99 KG/M2 | HEART RATE: 70 BPM

## 2021-12-15 DIAGNOSIS — I25.10 CAD IN NATIVE ARTERY: ICD-10-CM

## 2021-12-15 DIAGNOSIS — Z74.09 IMPAIRED MOBILITY: ICD-10-CM

## 2021-12-15 DIAGNOSIS — K21.9 GASTROESOPHAGEAL REFLUX DISEASE, UNSPECIFIED WHETHER ESOPHAGITIS PRESENT: ICD-10-CM

## 2021-12-15 DIAGNOSIS — R60.0 PEDAL EDEMA: Primary | ICD-10-CM

## 2021-12-15 DIAGNOSIS — Z95.5 STENTED CORONARY ARTERY: ICD-10-CM

## 2021-12-15 DIAGNOSIS — I47.29 NSVT (NONSUSTAINED VENTRICULAR TACHYCARDIA) (HCC): ICD-10-CM

## 2021-12-15 DIAGNOSIS — R60.0 BILATERAL LOWER EXTREMITY EDEMA: Primary | ICD-10-CM

## 2021-12-15 DIAGNOSIS — E78.2 MIXED HYPERLIPIDEMIA: ICD-10-CM

## 2021-12-15 PROBLEM — R91.1 LUNG NODULE: Status: ACTIVE | Noted: 2021-12-15

## 2021-12-15 PROBLEM — I50.33 ACUTE ON CHRONIC DIASTOLIC HEART FAILURE (HCC): Status: ACTIVE | Noted: 2021-12-15

## 2021-12-15 LAB
ALBUMIN SERPL-MCNC: 3.8 G/DL (ref 3.5–5.2)
ALBUMIN/GLOB SERPL: 1.5 G/DL
ALP SERPL-CCNC: 71 U/L (ref 39–117)
ALT SERPL W P-5'-P-CCNC: 15 U/L (ref 1–33)
ANION GAP SERPL CALCULATED.3IONS-SCNC: 7 MMOL/L (ref 5–15)
AST SERPL-CCNC: 16 U/L (ref 1–32)
BASOPHILS # BLD AUTO: 0.02 10*3/MM3 (ref 0–0.2)
BASOPHILS NFR BLD AUTO: 0.3 % (ref 0–1.5)
BILIRUB SERPL-MCNC: 0.3 MG/DL (ref 0–1.2)
BUN SERPL-MCNC: 27 MG/DL (ref 8–23)
BUN/CREAT SERPL: 52.9 (ref 7–25)
CALCIUM SPEC-SCNC: 9.1 MG/DL (ref 8.2–9.6)
CHLORIDE SERPL-SCNC: 102 MMOL/L (ref 98–107)
CO2 SERPL-SCNC: 28 MMOL/L (ref 22–29)
CREAT SERPL-MCNC: 0.51 MG/DL (ref 0.57–1)
DEPRECATED RDW RBC AUTO: 48.8 FL (ref 37–54)
EOSINOPHIL # BLD AUTO: 0.12 10*3/MM3 (ref 0–0.4)
EOSINOPHIL NFR BLD AUTO: 1.6 % (ref 0.3–6.2)
ERYTHROCYTE [DISTWIDTH] IN BLOOD BY AUTOMATED COUNT: 14.6 % (ref 12.3–15.4)
GFR SERPL CREATININE-BSD FRML MDRD: 111 ML/MIN/1.73
GLOBULIN UR ELPH-MCNC: 2.6 GM/DL
GLUCOSE BLDC GLUCOMTR-MCNC: 111 MG/DL (ref 70–130)
GLUCOSE SERPL-MCNC: 124 MG/DL (ref 65–99)
HCT VFR BLD AUTO: 35.1 % (ref 34–46.6)
HGB BLD-MCNC: 10.8 G/DL (ref 12–15.9)
HOLD SPECIMEN: NORMAL
HOLD SPECIMEN: NORMAL
IMM GRANULOCYTES # BLD AUTO: 0.06 10*3/MM3 (ref 0–0.05)
IMM GRANULOCYTES NFR BLD AUTO: 0.8 % (ref 0–0.5)
LYMPHOCYTES # BLD AUTO: 1.87 10*3/MM3 (ref 0.7–3.1)
LYMPHOCYTES NFR BLD AUTO: 24.9 % (ref 19.6–45.3)
MCH RBC QN AUTO: 28.3 PG (ref 26.6–33)
MCHC RBC AUTO-ENTMCNC: 30.8 G/DL (ref 31.5–35.7)
MCV RBC AUTO: 92.1 FL (ref 79–97)
MONOCYTES # BLD AUTO: 0.43 10*3/MM3 (ref 0.1–0.9)
MONOCYTES NFR BLD AUTO: 5.7 % (ref 5–12)
NEUTROPHILS NFR BLD AUTO: 5.01 10*3/MM3 (ref 1.7–7)
NEUTROPHILS NFR BLD AUTO: 66.7 % (ref 42.7–76)
NRBC BLD AUTO-RTO: 0 /100 WBC (ref 0–0.2)
NT-PROBNP SERPL-MCNC: 798.7 PG/ML (ref 0–1800)
PLATELET # BLD AUTO: 303 10*3/MM3 (ref 140–450)
PMV BLD AUTO: 9.1 FL (ref 6–12)
POTASSIUM SERPL-SCNC: 4.4 MMOL/L (ref 3.5–5.2)
PROT SERPL-MCNC: 6.4 G/DL (ref 6–8.5)
RBC # BLD AUTO: 3.81 10*6/MM3 (ref 3.77–5.28)
SARS-COV-2 RNA PNL SPEC NAA+PROBE: NOT DETECTED
SODIUM SERPL-SCNC: 137 MMOL/L (ref 136–145)
WBC NRBC COR # BLD: 7.51 10*3/MM3 (ref 3.4–10.8)
WHOLE BLOOD HOLD SPECIMEN: NORMAL
WHOLE BLOOD HOLD SPECIMEN: NORMAL

## 2021-12-15 PROCEDURE — 96374 THER/PROPH/DIAG INJ IV PUSH: CPT

## 2021-12-15 PROCEDURE — 83880 ASSAY OF NATRIURETIC PEPTIDE: CPT | Performed by: PHYSICIAN ASSISTANT

## 2021-12-15 PROCEDURE — 71045 X-RAY EXAM CHEST 1 VIEW: CPT

## 2021-12-15 PROCEDURE — 25010000002 ENOXAPARIN PER 10 MG: Performed by: INTERNAL MEDICINE

## 2021-12-15 PROCEDURE — G0378 HOSPITAL OBSERVATION PER HR: HCPCS

## 2021-12-15 PROCEDURE — C9803 HOPD COVID-19 SPEC COLLECT: HCPCS

## 2021-12-15 PROCEDURE — 96372 THER/PROPH/DIAG INJ SC/IM: CPT

## 2021-12-15 PROCEDURE — 82962 GLUCOSE BLOOD TEST: CPT

## 2021-12-15 PROCEDURE — 87635 SARS-COV-2 COVID-19 AMP PRB: CPT | Performed by: PHYSICIAN ASSISTANT

## 2021-12-15 PROCEDURE — 93970 EXTREMITY STUDY: CPT

## 2021-12-15 PROCEDURE — 36415 COLL VENOUS BLD VENIPUNCTURE: CPT

## 2021-12-15 PROCEDURE — 85025 COMPLETE CBC W/AUTO DIFF WBC: CPT | Performed by: PHYSICIAN ASSISTANT

## 2021-12-15 PROCEDURE — 93970 EXTREMITY STUDY: CPT | Performed by: SURGERY

## 2021-12-15 PROCEDURE — 25010000002 FUROSEMIDE PER 20 MG: Performed by: INTERNAL MEDICINE

## 2021-12-15 PROCEDURE — 99214 OFFICE O/P EST MOD 30 MIN: CPT | Performed by: INTERNAL MEDICINE

## 2021-12-15 PROCEDURE — 80053 COMPREHEN METABOLIC PANEL: CPT | Performed by: PHYSICIAN ASSISTANT

## 2021-12-15 PROCEDURE — 99284 EMERGENCY DEPT VISIT MOD MDM: CPT

## 2021-12-15 RX ORDER — PANTOPRAZOLE SODIUM 40 MG/1
40 TABLET, DELAYED RELEASE ORAL
Status: DISCONTINUED | OUTPATIENT
Start: 2021-12-16 | End: 2021-12-17 | Stop reason: HOSPADM

## 2021-12-15 RX ORDER — SODIUM CHLORIDE 0.9 % (FLUSH) 0.9 %
10 SYRINGE (ML) INJECTION AS NEEDED
Status: DISCONTINUED | OUTPATIENT
Start: 2021-12-15 | End: 2021-12-17 | Stop reason: HOSPADM

## 2021-12-15 RX ORDER — DEXTROSE MONOHYDRATE 25 G/50ML
25 INJECTION, SOLUTION INTRAVENOUS
Status: DISCONTINUED | OUTPATIENT
Start: 2021-12-15 | End: 2021-12-17 | Stop reason: HOSPADM

## 2021-12-15 RX ORDER — ROSUVASTATIN CALCIUM 10 MG/1
10 TABLET, COATED ORAL NIGHTLY
Status: DISCONTINUED | OUTPATIENT
Start: 2021-12-15 | End: 2021-12-17 | Stop reason: HOSPADM

## 2021-12-15 RX ORDER — SODIUM CHLORIDE 0.9 % (FLUSH) 0.9 %
10 SYRINGE (ML) INJECTION EVERY 12 HOURS SCHEDULED
Status: DISCONTINUED | OUTPATIENT
Start: 2021-12-15 | End: 2021-12-17 | Stop reason: HOSPADM

## 2021-12-15 RX ORDER — CLOPIDOGREL BISULFATE 75 MG/1
75 TABLET ORAL DAILY
Status: DISCONTINUED | OUTPATIENT
Start: 2021-12-15 | End: 2021-12-17 | Stop reason: HOSPADM

## 2021-12-15 RX ORDER — ONDANSETRON 2 MG/ML
4 INJECTION INTRAMUSCULAR; INTRAVENOUS EVERY 6 HOURS PRN
Status: DISCONTINUED | OUTPATIENT
Start: 2021-12-15 | End: 2021-12-17 | Stop reason: HOSPADM

## 2021-12-15 RX ORDER — ACETAMINOPHEN 160 MG/5ML
650 SOLUTION ORAL EVERY 4 HOURS PRN
Status: DISCONTINUED | OUTPATIENT
Start: 2021-12-15 | End: 2021-12-17 | Stop reason: HOSPADM

## 2021-12-15 RX ORDER — POLYETHYLENE GLYCOL 3350 17 G/17G
17 POWDER, FOR SOLUTION ORAL DAILY
Status: DISCONTINUED | OUTPATIENT
Start: 2021-12-15 | End: 2021-12-17 | Stop reason: HOSPADM

## 2021-12-15 RX ORDER — ONDANSETRON 4 MG/1
4 TABLET, FILM COATED ORAL EVERY 6 HOURS PRN
Status: DISCONTINUED | OUTPATIENT
Start: 2021-12-15 | End: 2021-12-17 | Stop reason: HOSPADM

## 2021-12-15 RX ORDER — NITROGLYCERIN 0.4 MG/1
0.4 TABLET SUBLINGUAL
Status: DISCONTINUED | OUTPATIENT
Start: 2021-12-15 | End: 2021-12-17 | Stop reason: HOSPADM

## 2021-12-15 RX ORDER — ASPIRIN 81 MG/1
81 TABLET ORAL DAILY
Status: DISCONTINUED | OUTPATIENT
Start: 2021-12-16 | End: 2021-12-17 | Stop reason: HOSPADM

## 2021-12-15 RX ORDER — CARVEDILOL 3.12 MG/1
3.12 TABLET ORAL 2 TIMES DAILY
Status: DISCONTINUED | OUTPATIENT
Start: 2021-12-15 | End: 2021-12-17 | Stop reason: HOSPADM

## 2021-12-15 RX ORDER — NICOTINE POLACRILEX 4 MG
15 LOZENGE BUCCAL
Status: DISCONTINUED | OUTPATIENT
Start: 2021-12-15 | End: 2021-12-17 | Stop reason: HOSPADM

## 2021-12-15 RX ORDER — ACETAMINOPHEN 325 MG/1
650 TABLET ORAL EVERY 4 HOURS PRN
Status: DISCONTINUED | OUTPATIENT
Start: 2021-12-15 | End: 2021-12-17 | Stop reason: HOSPADM

## 2021-12-15 RX ORDER — ACETAMINOPHEN 650 MG/1
650 SUPPOSITORY RECTAL EVERY 4 HOURS PRN
Status: DISCONTINUED | OUTPATIENT
Start: 2021-12-15 | End: 2021-12-17 | Stop reason: HOSPADM

## 2021-12-15 RX ORDER — FUROSEMIDE 10 MG/ML
20 INJECTION INTRAMUSCULAR; INTRAVENOUS EVERY 8 HOURS
Status: DISCONTINUED | OUTPATIENT
Start: 2021-12-15 | End: 2021-12-17 | Stop reason: HOSPADM

## 2021-12-15 RX ORDER — GLIMEPIRIDE 1 MG/1
1 TABLET ORAL 2 TIMES DAILY
COMMUNITY
Start: 2021-12-03

## 2021-12-15 RX ADMIN — SODIUM CHLORIDE, PRESERVATIVE FREE 10 ML: 5 INJECTION INTRAVENOUS at 20:45

## 2021-12-15 RX ADMIN — ENOXAPARIN SODIUM 30 MG: 30 INJECTION SUBCUTANEOUS at 18:37

## 2021-12-15 RX ADMIN — ROSUVASTATIN CALCIUM 10 MG: 10 TABLET, FILM COATED ORAL at 20:44

## 2021-12-15 RX ADMIN — FUROSEMIDE 20 MG: 10 INJECTION, SOLUTION INTRAMUSCULAR; INTRAVENOUS at 18:37

## 2021-12-15 RX ADMIN — CARVEDILOL 3.12 MG: 3.12 TABLET, FILM COATED ORAL at 20:44

## 2021-12-15 NOTE — PROGRESS NOTES
Referring Provider: Florentin Costello MD    Reason for Follow-up Visit:Here for routine follow up   coronary artery disease   stented coronary artery   sick sinus syndrome s/p pacemaker   Cardiac workup test results as below: echo, stress test    non sustained ventricular tachycardia that is brief on device check in past   Massive pedal edema   Last visit went to ER and discharged after IV diuretics     Subjective    Once again increasing pedal edema despite diuretic therapy  Her primary stopped her diuretics   Both she and family not sure why   Massive pedal edema now   Right leg worse than left     Tolerating current medications well with no untoward side effects   Compliant with prescribed medication regimen. Tries to adhere to cardiac diet.      No further chest pain   Some baseline shortness of breath   No different now     No palpitation  Decreased effort tolerance    No presyncope or syncope  Compliant with medications      Easy fatiguability     Joint pain in small, medium and large joints   Chronic low back pain      BP well controlled at home.     No bleeding, excessive bruising, gait instability or fall risks           History of present illness:  Emiliano Recinos is a 89 y.o. yo female with coronary artery disease stented coronary artery who presents today for   Chief Complaint   Patient presents with   • Edema     BLE. States has not improved. PCP stopped Bumex   .    History  Past Medical History:   Diagnosis Date   • Bradycardia    • CAD in native artery    • Cardiac device in situ 03/29/2011    RCA stent   • Chest pain    • Diabetes mellitus type 2, uncomplicated ([facility])    • GERD (gastroesophageal reflux disease)    • Hard of hearing    • History of myocardial infarction 03/29/2011    EF 45% 2/11 cath   • Hyperlipemia, mixed    • Hypertension, benign    • Palpitation    • SSS (sick sinus syndrome) ([facility])    • Status cardiac pacemaker    • Syncope    ,   Past Surgical History:   Procedure Laterality  Date   • BLADDER SUSPENSION      with hysterectomy   • CARDIAC CATHETERIZATION     • CORONARY STENT PLACEMENT      x 1   • HYSTERECTOMY      total   • INSERT / REPLACE / REMOVE PACEMAKER     • PACEMAKER IMPLANTATION     ,   Family History   Problem Relation Age of Onset   • Coronary artery disease Mother    • Heart attack Mother    ,   Social History     Tobacco Use   • Smoking status: Never Smoker   • Smokeless tobacco: Never Used   Vaping Use   • Vaping Use: Never used   Substance Use Topics   • Alcohol use: No   • Drug use: No   ,     Medications  Current Outpatient Medications   Medication Sig Dispense Refill   • aspirin 81 MG tablet Take 81 mg by mouth daily.     • carvedilol (COREG) 3.125 MG tablet TAKE (1) TABLET BY MOUTH TWICE A DAY. 180 tablet 4   • clopidogrel (PLAVIX) 75 MG tablet Take 75 mg by mouth daily.     • nitroglycerin (NITROSTAT) 0.4 MG SL tablet Place 0.4 mg under the tongue daily.     • omeprazole (PriLOSEC) 20 MG capsule Take 20 mg by mouth daily.     • Polyethylene Glycol 3350 (MIRALAX PO) Take  by mouth.     • bumetanide (BUMEX) 1 MG tablet TAKE (1) TABLET BY MOUTH TWICE A DAY. 180 tablet 4   • glimepiride (AMARYL) 1 MG tablet      • rosuvastatin (CRESTOR) 10 MG tablet Take 10 mg by mouth. Monday, Wed, Fri       No current facility-administered medications for this visit.       Allergies:  Metoclopramide    Review of Systems  Review of Systems   Constitutional: Positive for malaise/fatigue.   HENT: Negative.    Eyes: Negative.    Cardiovascular: Positive for leg swelling. Negative for chest pain, claudication, cyanosis, dyspnea on exertion, irregular heartbeat, near-syncope, orthopnea, palpitations, paroxysmal nocturnal dyspnea and syncope.   Respiratory: Negative.    Endocrine: Negative.    Hematologic/Lymphatic: Negative.    Skin: Negative.    Musculoskeletal: Positive for arthritis and joint pain.   Gastrointestinal: Negative for anorexia.   Genitourinary: Negative.    Neurological:  "Positive for weakness.   Psychiatric/Behavioral: Negative.      Same review of system and physical exam as last visit      Objective     Physical Exam:  /70   Pulse 70   Ht 165.1 cm (65\")   Wt 62.6 kg (138 lb)   SpO2 98%   BMI 22.96 kg/m²   Physical Exam   Constitutional: She appears well-developed.   HENT:   Head: Normocephalic.   Neck: Normal carotid pulses and no JVD present. No tracheal tenderness present. Carotid bruit is not present. No tracheal deviation present.   Cardiovascular: Regular rhythm, normal heart sounds and normal pulses.   Pulmonary/Chest: Effort normal. No stridor.   Abdominal: Soft. She exhibits no distension. There is no abdominal tenderness.   Musculoskeletal:      Right lower le+ Edema present.      Left lower leg: 3+ Edema present.   Neurological: She is alert. No cranial nerve deficit or sensory deficit.   Skin: Skin is warm.   Psychiatric: Her speech is normal and behavior is normal.       Results Review:     History: Swelling     IMPRESSION:  Impression: There is no evidence of deep venous thrombosis or  superficial thrombophlebitis of right or left lower extremities.     Comments: Bilateral lower extremity venous duplex exam was performed  using color Doppler flow, Doppler waveform analysis, and grayscale  imaging, with and without compression. There is no evidence of deep  venous thrombosis in the common femoral, superficial femoral, popliteal,  peroneal, anterior tibial, and posterior tibial veins bilaterally. No  thrombus is identified in the saphenofemoral junctions and greater  saphenous veins bilaterally.         This report was finalized on 2021 14:05 by Dr. Edwar Woods MD.    Results for orders placed during the hospital encounter of 20    Adult Transthoracic Echo Complete W/ Cont if Necessary Per Protocol    Interpretation Summary  · Left ventricular ejection fraction appears to be 61 - 65%.  · Abnormal global longitudinal LV strain (GLS) = " -13.7%.  · Left ventricular diastolic function is consistent with (grade I) impaired relaxation.  · No pulmonary hypertension  myocardial perfusion scan  3/17    · Myocardial perfusion imaging indicates a normal myocardial perfusion study with no evidence of ischemia.  · Impressions are consistent with a low risk study.  · Left ventricular ejection fraction is hyperdynamic (Calculated EF > 70%).  · Raw images reviewed with the following abnormalities noted: scanned with arms down.        Procedures  ____________________________________________________________________________________________________________________________________________  Health maintenance and recommendations  Similar recommendations as last visit     Offered to give patient  a copy      Questions were encouraged, asked and answered to the patient's  understanding and satisfaction. Questions if any regarding current medications and side effects, need for refills and importance of compliance to medications stressed.    Reviewed available prior notes, consults, prior visits, laboratory findings, radiology and cardiology relevant reports. Updated chart as applicable. I have reviewed the patient's medical history in detail and updated the computerized patient record as relevant.      Updated patient regarding any new or relevant abnormalities on review of records or any new findings on physical exam. Mentioned to patient about purpose of visit and desirable health short and long term goals and objectives.    Primary to monitor CBC CMP Lipid panel and TSH as applicable    ___________________________________________________________________________________________________________________________________________      Diagnoses and all orders for this visit:    1. Pedal edema (Primary)    2. NSVT (nonsustained ventricular tachycardia) (HCC)    3. Mixed hyperlipidemia    4. CAD in native artery    5. Gastroesophageal reflux disease, unspecified whether  esophagitis present    6. Stented coronary artery         Plan    Recommend ER visit and admission for IV diuretics for massive edema   Due to massive pedal edema will send to ER again   Needs labs and  IV diuretics in ER   After admission will need monitoring of kidney functions     After discharge strict low sodium diet     Patient expressed understanding  Encouraged and answered all questions   Discussed with the patient and all questioned fully answered. She will call me if any problems arise.   Discussed results of prior testing with patient : echo and prior recent negative DVT study     I support the patient's decision to take the Covid -19 vaccine   Had required complete course   No major issues   Now fully immunized    Had booster too      Follow up in 4 weeks after discharge   Follow up with [facility]  or myself              Return in about 4 weeks (around 1/12/2022).

## 2021-12-15 NOTE — ED PROVIDER NOTES
Subjective   History of Present Illness    Patient is a pleasant 89-year-old female chief complaint of bilateral lower extremity edema.  The patient describes been on for some time but she not really sure how long.  She was seen in this ED a month ago due to worsening lower extremity edema.  She did have venous ultrasound of her bilateral lower extremity which did not reveal a thrombus.  She was started on IV diuretics and given oral diuretics.  The patient followed up with her primary care provider sometime after that and she describes the medication was stopped.  Since then, the swelling has perpetuated.  She followed up with her cardiologist, Dr. Abdullahi today.  Because edema was so significant, he advised to go to the ER to get admitted for further evaluation and treatment.    Patient denies any associated chest pain, pressure, tightness.  She denies any shortness of breath.  She has any fever or cough.  She denies any history of DVT or PE.  She did complete an adult transthoracic echocardiogram back in October 2020 revealing an EF to be 61 to 65%.        Review of Systems   Constitutional: Positive for activity change and fever.   HENT: Negative.    Respiratory: Negative.  Negative for shortness of breath.    Cardiovascular: Positive for leg swelling. Negative for chest pain.   Gastrointestinal: Negative.    Genitourinary: Negative.    Musculoskeletal: Negative for arthralgias.   Skin: Negative.    Neurological: Negative.    Psychiatric/Behavioral: Negative.    All other systems reviewed and are negative.      Past Medical History:   Diagnosis Date   • Bradycardia    • CAD in native artery    • Cardiac device in situ 03/29/2011    RCA stent   • Chest pain    • Diabetes mellitus type 2, uncomplicated (HCC)    • GERD (gastroesophageal reflux disease)    • Hard of hearing    • History of myocardial infarction 03/29/2011    EF 45% 2/11 cath   • Hyperlipemia, mixed    • Hypertension, benign    • Palpitation    • SSS  (sick sinus syndrome) ([facility])    • Status cardiac pacemaker    • Syncope        Allergies   Allergen Reactions   • Metoclopramide Swelling     Legs swelled       Past Surgical History:   Procedure Laterality Date   • BLADDER SUSPENSION      with hysterectomy   • CARDIAC CATHETERIZATION     • CORONARY STENT PLACEMENT      x 1   • HYSTERECTOMY      total   • INSERT / REPLACE / REMOVE PACEMAKER     • PACEMAKER IMPLANTATION         Family History   Problem Relation Age of Onset   • Coronary artery disease Mother    • Heart attack Mother        Social History     Socioeconomic History   • Marital status:    Tobacco Use   • Smoking status: Never Smoker   • Smokeless tobacco: Never Used   Vaping Use   • Vaping Use: Never used   Substance and Sexual Activity   • Alcohol use: No   • Drug use: No   • Sexual activity: Defer       Prior to Admission medications    Medication Sig Start Date End Date Taking? Authorizing Provider   aspirin 81 MG tablet Take 81 mg by mouth daily.    Karey Saldivar MD   bumetanide (BUMEX) 1 MG tablet TAKE (1) TABLET BY MOUTH TWICE A DAY. 2/13/20   Douglas Abdullahi MD   carvedilol (COREG) 3.125 MG tablet TAKE (1) TABLET BY MOUTH TWICE A DAY. 11/6/21   Douglas Abdullahi MD   clopidogrel (PLAVIX) 75 MG tablet Take 75 mg by mouth daily.    Karey Saldivar MD   glimepiride (AMARYL) 1 MG tablet  12/3/21   Karey Saldivar MD   nitroglycerin (NITROSTAT) 0.4 MG SL tablet Place 0.4 mg under the tongue daily.    Karey Saldivar MD   omeprazole (PriLOSEC) 20 MG capsule Take 20 mg by mouth daily.    Karey Saldivar MD   Polyethylene Glycol 3350 (MIRALAX PO) Take  by mouth.    Karey Saldivar MD   rosuvastatin (CRESTOR) 10 MG tablet Take 10 mg by mouth. Monday, Wed, Fri    Karey Saldivar MD   glipizide (GLUCOTROL) 5 MG tablet Take 1 mg by mouth 2 (Two) Times a Day Before Meals.  12/15/21  Karey Saldivar MD       Medications   sodium chloride 0.9 % flush 10 mL (has  "no administration in time range)   furosemide (LASIX) injection 20 mg (has no administration in time range)       /67   Pulse 65   Temp 98 °F (36.7 °C) (Oral)   Resp 17   Ht 165.1 cm (65\")   Wt 62.6 kg (138 lb)   SpO2 100%   BMI 22.96 kg/m²       Objective   Physical Exam  Vitals and nursing note reviewed.   Constitutional:       General: She is not in acute distress.     Appearance: Normal appearance. She is well-developed. She is not diaphoretic.   HENT:      Head: Normocephalic and atraumatic.   Eyes:      Conjunctiva/sclera: Conjunctivae normal.      Pupils: Pupils are equal, round, and reactive to light.   Neck:      Trachea: No tracheal deviation.   Cardiovascular:      Rate and Rhythm: Normal rate and regular rhythm.      Heart sounds: Normal heart sounds. No murmur heard.      Pulmonary:      Effort: Pulmonary effort is normal.      Breath sounds: Normal breath sounds.   Abdominal:      General: Bowel sounds are normal. There is no distension.      Palpations: Abdomen is soft. There is no mass.      Tenderness: There is no abdominal tenderness. There is no guarding or rebound.   Musculoskeletal:         General: Swelling present. Normal range of motion.      Cervical back: Normal range of motion and neck supple.      Right lower leg: Edema present.      Left lower leg: Edema present.      Comments: 4+ pitting edema to right lower extremity and 3+ pitting edema left lower extremity.   Skin:     General: Skin is warm and dry.      Capillary Refill: Capillary refill takes less than 2 seconds.   Neurological:      General: No focal deficit present.      Mental Status: She is alert and oriented to person, place, and time.      Deep Tendon Reflexes: Reflexes are normal and symmetric.   Psychiatric:         Mood and Affect: Mood normal.         Behavior: Behavior normal.         Thought Content: Thought content normal.         Judgment: Judgment normal.         Procedures         Lab Results (last 24 " hours)     Procedure Component Value Units Date/Time    CBC & Differential [[id number]]  (Abnormal) Collected: 12/15/21 1439    Specimen: Blood Updated: 12/15/21 1518    Narrative:      The following orders were created for panel order CBC & Differential.  Procedure                               Abnormality         Status                     ---------                               -----------         ------                     CBC Auto Differential[[id number]]        Abnormal            Final result                 Please view results for these tests on the individual orders.    Comprehensive Metabolic Panel [[id number]]  (Abnormal) Collected: 12/15/21 1439    Specimen: Blood Updated: 12/15/21 1532     Glucose 124 mg/dL      BUN 27 mg/dL      Creatinine 0.51 mg/dL      Sodium 137 mmol/L      Potassium 4.4 mmol/L      Chloride 102 mmol/L      CO2 28.0 mmol/L      Calcium 9.1 mg/dL      Total Protein 6.4 g/dL      Albumin 3.80 g/dL      ALT (SGPT) 15 U/L      AST (SGOT) 16 U/L      Alkaline Phosphatase 71 U/L      Total Bilirubin 0.3 mg/dL      eGFR Non African Amer 111 mL/min/1.73      Globulin 2.6 gm/dL      A/G Ratio 1.5 g/dL      BUN/Creatinine Ratio 52.9     Anion Gap 7.0 mmol/L     Narrative:      GFR Normal >60  Chronic Kidney Disease <60  Kidney Failure <15      BNP [[id number]]  (Normal) Collected: 12/15/21 1439    Specimen: Blood Updated: 12/15/21 1530     proBNP 798.7 pg/mL     Narrative:      Among patients with dyspnea, NT-proBNP is highly sensitive for the detection of acute congestive heart failure. In addition NT-proBNP of <300 pg/ml effectively rules out acute congestive heart failure with 99% negative predictive value.    Results may be falsely decreased if patient taking Biotin.      CBC Auto Differential [[id number]]  (Abnormal) Collected: 12/15/21 1439    Specimen: Blood Updated: 12/15/21 1518     WBC 7.51 10*3/mm3      RBC 3.81 10*6/mm3      Hemoglobin 10.8 g/dL      Hematocrit 35.1 %      MCV 92.1 fL       MCH 28.3 pg      MCHC 30.8 g/dL      RDW 14.6 %      RDW-SD 48.8 fl      MPV 9.1 fL      Platelets 303 10*3/mm3      Neutrophil % 66.7 %      Lymphocyte % 24.9 %      Monocyte % 5.7 %      Eosinophil % 1.6 %      Basophil % 0.3 %      Immature Grans % 0.8 %      Neutrophils, Absolute 5.01 10*3/mm3      Lymphocytes, Absolute 1.87 10*3/mm3      Monocytes, Absolute 0.43 10*3/mm3      Eosinophils, Absolute 0.12 10*3/mm3      Basophils, Absolute 0.02 10*3/mm3      Immature Grans, Absolute 0.06 10*3/mm3      nRBC 0.0 /100 WBC     COVID PRE-OP / PRE-PROCEDURE SCREENING ORDER (NO ISOLATION) - Swab, Nasal Cavity [[id number]]  (Normal) Collected: 12/15/21 1532    Specimen: Swab from Nasal Cavity Updated: 12/15/21 1626    Narrative:      The following orders were created for panel order COVID PRE-OP / PRE-PROCEDURE SCREENING ORDER (NO ISOLATION) - Swab, Nasal Cavity.  Procedure                               Abnormality         Status                     ---------                               -----------         ------                     COVID-19,[employer] [facility]AY[facility]...[[id number]]  Normal              Final result                 Please view results for these tests on the individual orders.    COVID-19,[employer] IN-HOUSE,Nasal Swab No Transport Media 3-4 HR TAT - Swab, Nasal Cavity [[id number]]  (Normal) Collected: 12/15/21 1532    Specimen: Swab from Nasal Cavity Updated: 12/15/21 1626     COVID19 Not Detected    Narrative:      Fact sheet for providers: https://www.fda.gov/media/654930/download     Fact sheet for patients: https://www.fda.gov/media/193116/download    Test performed by PCR.    Consider negative results in combination with clinical observations, patient history, and epidemiological information.          XR Chest 1 View    Result Date: 12/15/2021  Narrative: Frontal upright radiograph of the chest 12/15/2021 4:29 PM CST  HISTORY: Fluid overload, pedal edema  COMPARISON: Chest exam dated 11/19/2021.  FINDINGS:   1.5 cm nodular opacity in the left lung base, just lateral to the left heart border. Lungs are otherwise clear. No lung consolidation. No pleural effusion or pneumothorax. Heart size is stable. Pulmonary vasculature are nondilated. Left chest wall dual-chamber pacemaker. The osseous structures and surrounding soft tissues demonstrate no acute abnormality.      Impression: 1. No evidence of acute infiltrate or pulmonary edema. 2. There is a suspicious 1.5 cm nodular opacity in the left lung base. This is not resolved from the earlier comparison exam. A small primary lung malignancy is not excluded.   This report was finalized on 12/15/2021 16:38 by Dr Alex Hong, .    XR Chest 1 View    Result Date: 11/19/2021  Narrative: EXAMINATION: XR CHEST 1 VW- 11/19/2021 1:01 PM CST  HISTORY: Shortness of breath  COMPARISON: 8/31/2021  FINDINGS: Heart and mediastinal contours appear normal. Atherosclerotic calcifications are seen in the aorta. Chronic interstitial changes are noted with associated hyperinflation. There are airspace opacities in the lingula. There is no pneumothorax or pleural effusion. A left subclavian approach dual lead cardiac pacing device is in place.      Impression: Airspace opacities in the lingula concerning for an infectious or inflammatory process. This report was finalized on 11/19/2021 13:02 by Dr. Chris Mai MD.    US Venous Doppler Lower Extremity Bilateral (duplex)    Result Date: 11/19/2021  Narrative: History: Swelling      Impression: Impression: There is no evidence of deep venous thrombosis or superficial thrombophlebitis of right or left lower extremities.  Comments: Bilateral lower extremity venous duplex exam was performed using color Doppler flow, Doppler waveform analysis, and grayscale imaging, with and without compression. There is no evidence of deep venous thrombosis in the common femoral, superficial femoral, popliteal, peroneal, anterior tibial, and posterior tibial veins  bilaterally. No thrombus is identified in the saphenofemoral junctions and greater saphenous veins bilaterally.   This report was finalized on 11/19/2021 14:05 by Dr. Edwar Woods MD.      ED Course  ED Course as of 12/15/21 1718   Wed Dec 15, 2021   1605 US Venous Doppler Lower Extremity Bilateral (duplex) [[id number]] (Order [id number])  Order  Status: In process      Patient Location    Patient Class Location  Emergency [facility] EMERGENCY DEPT, 46, 46 [phone]    Study Notes       CoatsTatum muniz MICHAEL on 12/15/2021  3:54 PM  BLE no thrombus visualized     [TK]   1714 I did speak with Dr. Abdullahi, cardiologist who had seen the patient.  He is not on-call at this time but would like the patient admitted to medicine services.  He request the patient placed on Lasix 5 mg IV per hour.  I did speak with Dr. Méndez, hospitalist on call.  He would like patient admitted under Dr. Saunders's services. [TK]      ED Course User Index  [TK] Rick Kumar PA          [facility]    Final diagnoses:   Bilateral lower extremity edema          Rick Kumar PA  12/15/21 1718

## 2021-12-16 LAB
ANION GAP SERPL CALCULATED.3IONS-SCNC: 9 MMOL/L (ref 5–15)
BASOPHILS # BLD AUTO: 0.04 10*3/MM3 (ref 0–0.2)
BASOPHILS NFR BLD AUTO: 0.6 % (ref 0–1.5)
BUN SERPL-MCNC: 27 MG/DL (ref 8–23)
BUN/CREAT SERPL: 40.9 (ref 7–25)
CALCIUM SPEC-SCNC: 9.3 MG/DL (ref 8.2–9.6)
CHLORIDE SERPL-SCNC: 102 MMOL/L (ref 98–107)
CO2 SERPL-SCNC: 30 MMOL/L (ref 22–29)
CREAT SERPL-MCNC: 0.66 MG/DL (ref 0.57–1)
DEPRECATED RDW RBC AUTO: 48.5 FL (ref 37–54)
EOSINOPHIL # BLD AUTO: 0.13 10*3/MM3 (ref 0–0.4)
EOSINOPHIL NFR BLD AUTO: 2.1 % (ref 0.3–6.2)
ERYTHROCYTE [DISTWIDTH] IN BLOOD BY AUTOMATED COUNT: 14.3 % (ref 12.3–15.4)
GFR SERPL CREATININE-BSD FRML MDRD: 83 ML/MIN/1.73
GLUCOSE BLDC GLUCOMTR-MCNC: 141 MG/DL (ref 70–130)
GLUCOSE BLDC GLUCOMTR-MCNC: 151 MG/DL (ref 70–130)
GLUCOSE BLDC GLUCOMTR-MCNC: 220 MG/DL (ref 70–130)
GLUCOSE SERPL-MCNC: 148 MG/DL (ref 65–99)
HBA1C MFR BLD: 7.3 % (ref 4.8–5.6)
HCT VFR BLD AUTO: 35.4 % (ref 34–46.6)
HGB BLD-MCNC: 11 G/DL (ref 12–15.9)
IMM GRANULOCYTES # BLD AUTO: 0.06 10*3/MM3 (ref 0–0.05)
IMM GRANULOCYTES NFR BLD AUTO: 1 % (ref 0–0.5)
LYMPHOCYTES # BLD AUTO: 1.7 10*3/MM3 (ref 0.7–3.1)
LYMPHOCYTES NFR BLD AUTO: 27.1 % (ref 19.6–45.3)
MAGNESIUM SERPL-MCNC: 2.1 MG/DL (ref 1.7–2.3)
MCH RBC QN AUTO: 28.9 PG (ref 26.6–33)
MCHC RBC AUTO-ENTMCNC: 31.1 G/DL (ref 31.5–35.7)
MCV RBC AUTO: 93.2 FL (ref 79–97)
MONOCYTES # BLD AUTO: 0.44 10*3/MM3 (ref 0.1–0.9)
MONOCYTES NFR BLD AUTO: 7 % (ref 5–12)
NEUTROPHILS NFR BLD AUTO: 3.91 10*3/MM3 (ref 1.7–7)
NEUTROPHILS NFR BLD AUTO: 62.2 % (ref 42.7–76)
NRBC BLD AUTO-RTO: 0 /100 WBC (ref 0–0.2)
PHOSPHATE SERPL-MCNC: 4.2 MG/DL (ref 2.5–4.5)
PLATELET # BLD AUTO: 280 10*3/MM3 (ref 140–450)
PMV BLD AUTO: 8.9 FL (ref 6–12)
POTASSIUM SERPL-SCNC: 4.2 MMOL/L (ref 3.5–5.2)
RBC # BLD AUTO: 3.8 10*6/MM3 (ref 3.77–5.28)
SODIUM SERPL-SCNC: 141 MMOL/L (ref 136–145)
TSH SERPL DL<=0.05 MIU/L-ACNC: 1.35 UIU/ML (ref 0.27–4.2)
WBC NRBC COR # BLD: 6.28 10*3/MM3 (ref 3.4–10.8)

## 2021-12-16 PROCEDURE — 25010000002 ENOXAPARIN PER 10 MG: Performed by: INTERNAL MEDICINE

## 2021-12-16 PROCEDURE — G0378 HOSPITAL OBSERVATION PER HR: HCPCS

## 2021-12-16 PROCEDURE — 83735 ASSAY OF MAGNESIUM: CPT | Performed by: INTERNAL MEDICINE

## 2021-12-16 PROCEDURE — 80048 BASIC METABOLIC PNL TOTAL CA: CPT | Performed by: INTERNAL MEDICINE

## 2021-12-16 PROCEDURE — 63710000001 INSULIN LISPRO (HUMAN) PER 5 UNITS: Performed by: INTERNAL MEDICINE

## 2021-12-16 PROCEDURE — 97165 OT EVAL LOW COMPLEX 30 MIN: CPT

## 2021-12-16 PROCEDURE — 85025 COMPLETE CBC W/AUTO DIFF WBC: CPT | Performed by: INTERNAL MEDICINE

## 2021-12-16 PROCEDURE — 96376 TX/PRO/DX INJ SAME DRUG ADON: CPT

## 2021-12-16 PROCEDURE — 97161 PT EVAL LOW COMPLEX 20 MIN: CPT | Performed by: PHYSICAL THERAPIST

## 2021-12-16 PROCEDURE — 96372 THER/PROPH/DIAG INJ SC/IM: CPT

## 2021-12-16 PROCEDURE — 84443 ASSAY THYROID STIM HORMONE: CPT | Performed by: INTERNAL MEDICINE

## 2021-12-16 PROCEDURE — 83036 HEMOGLOBIN GLYCOSYLATED A1C: CPT | Performed by: INTERNAL MEDICINE

## 2021-12-16 PROCEDURE — 25010000002 FUROSEMIDE PER 20 MG: Performed by: INTERNAL MEDICINE

## 2021-12-16 PROCEDURE — 29580 STRAPPING UNNA BOOT: CPT | Performed by: PHYSICAL THERAPIST

## 2021-12-16 PROCEDURE — 82962 GLUCOSE BLOOD TEST: CPT

## 2021-12-16 PROCEDURE — 84100 ASSAY OF PHOSPHORUS: CPT | Performed by: INTERNAL MEDICINE

## 2021-12-16 PROCEDURE — 99214 OFFICE O/P EST MOD 30 MIN: CPT | Performed by: NURSE PRACTITIONER

## 2021-12-16 RX ORDER — CARVEDILOL 3.12 MG/1
3.12 TABLET ORAL 2 TIMES DAILY WITH MEALS
COMMUNITY
End: 2022-12-19

## 2021-12-16 RX ORDER — HYDROCODONE BITARTRATE AND ACETAMINOPHEN 5; 325 MG/1; MG/1
1 TABLET ORAL 2 TIMES DAILY PRN
COMMUNITY

## 2021-12-16 RX ORDER — BUMETANIDE 1 MG/1
1 TABLET ORAL DAILY
Status: ON HOLD | COMMUNITY
End: 2021-12-17 | Stop reason: SDUPTHER

## 2021-12-16 RX ADMIN — CLOPIDOGREL 75 MG: 75 TABLET, FILM COATED ORAL at 17:27

## 2021-12-16 RX ADMIN — ENOXAPARIN SODIUM 40 MG: 40 INJECTION SUBCUTANEOUS at 20:56

## 2021-12-16 RX ADMIN — CARVEDILOL 3.12 MG: 3.12 TABLET, FILM COATED ORAL at 20:57

## 2021-12-16 RX ADMIN — FUROSEMIDE 20 MG: 10 INJECTION, SOLUTION INTRAMUSCULAR; INTRAVENOUS at 17:27

## 2021-12-16 RX ADMIN — POLYETHYLENE GLYCOL 3350 17 G: 17 POWDER, FOR SOLUTION ORAL at 08:15

## 2021-12-16 RX ADMIN — INSULIN LISPRO 2 UNITS: 100 INJECTION, SOLUTION INTRAVENOUS; SUBCUTANEOUS at 17:27

## 2021-12-16 RX ADMIN — PANTOPRAZOLE SODIUM 40 MG: 40 TABLET, DELAYED RELEASE ORAL at 05:51

## 2021-12-16 RX ADMIN — FUROSEMIDE 20 MG: 10 INJECTION, SOLUTION INTRAMUSCULAR; INTRAVENOUS at 02:19

## 2021-12-16 RX ADMIN — FUROSEMIDE 20 MG: 10 INJECTION, SOLUTION INTRAMUSCULAR; INTRAVENOUS at 08:35

## 2021-12-16 RX ADMIN — ASPIRIN 81 MG: 81 TABLET, COATED ORAL at 08:15

## 2021-12-16 RX ADMIN — SODIUM CHLORIDE, PRESERVATIVE FREE 10 ML: 5 INJECTION INTRAVENOUS at 08:15

## 2021-12-16 RX ADMIN — ROSUVASTATIN CALCIUM 10 MG: 10 TABLET, FILM COATED ORAL at 20:57

## 2021-12-16 RX ADMIN — SODIUM CHLORIDE, PRESERVATIVE FREE 10 ML: 5 INJECTION INTRAVENOUS at 20:59

## 2021-12-16 RX ADMIN — INSULIN LISPRO 3 UNITS: 100 INJECTION, SOLUTION INTRAVENOUS; SUBCUTANEOUS at 12:18

## 2021-12-16 RX ADMIN — CARVEDILOL 3.12 MG: 3.12 TABLET, FILM COATED ORAL at 08:15

## 2021-12-16 NOTE — PLAN OF CARE
Problem: Adult Inpatient Plan of Care  Goal: Plan of Care Review  Recent Flowsheet Documentation  Taken 12/16/2021 0999 by Lidia Maldonado PT DPT  Progress: no change  Plan of Care Reviewed With: patient  Outcome Summary: PT eval completed. Pt alert and oriented x4, sitting EOB with dtr present upon arrival. Pt with c/o R knee pain and BLE edema. Pt with dec strength in BLE, with greater deficits in RLE due to knee pain. Pt performed sit to stand t/f and gait training with CGA and RW. Pt with significant shuffled gait pattern, with forward flexed posture and dec gait speed. Able to take bigger steps when cued but then returns back to shuffled steps. Pt reqd cues for proper AD placement. Pt typically walks household distances with use of walker and without assistance. Pt will benefit from skilled PT to improve fxl mobility, endurance and safety. Recommend d/c home with assist and HH. Pt would also benefit from rollator at d/c as she currently only has a standard walker.  Taken 12/16/2021 0988 by Lidia Maldonado PT DPT  Progress: no change  Plan of Care Reviewed With: patient  Outcome Summary: PT wound eval completed for application of unna boots to BLE. Pt with BLE edema, R>L and reports this has been an ongoing concern. Pt with intact cap refill, sensation and dorsalis pedis pulse pre and post application of unna boots. BLE wrapped from met heads to tib tuberosity with unna boots and then coban. PT will check dressing daily and change in 5-7 days or sooner per MD.

## 2021-12-16 NOTE — PROGRESS NOTES
"Pharmacy Renal Dose Conversion    Emiliano Recinos is a 89 y.o. year old female  165.1 cm (65\") 62 kg (136 lb 9.6 oz)    Estimated Creatinine Clearance: 38.4 mL/min (by C-G formula based on SCr of 0.66 mg/dL).   Creatinine   Date Value Ref Range Status   12/16/2021 0.66 0.57 - 1.00 mg/dL Final   12/15/2021 0.51 (L) 0.57 - 1.00 mg/dL Final   11/19/2021 0.51 (L) 0.57 - 1.00 mg/dL Final       PLAN  Based on prescribing information provided by the drug , Enoxaparin 30mg sq Q24H,  has been changed to Enoxaparin 40mg sq Q24H for VTE prophylaxis.    Adjusted per the directives and guidelines established by [facility] Pharmacy and Therapeutics Committee and [facility] Medical Executive Committee.  Pharmacy will continue to monitor daily and make further adjustment(s) accordingly.    Wilmer Francois, PharmD  12/16/2113:18 CST    "

## 2021-12-16 NOTE — H&P
[facility] Medicine Services  HISTORY AND PHYSICAL    Date of Admission: 12/15/2021  Primary Care Physician: Florentin Costello MD    Subjective     Chief Complaint: lower extremity edema    History of Present Illness  Ms. Andrade is a very pleasant 89-year-old female with past medical history of diastolic heart failure, hypertension, and coronary artery disease.  Patient was seen by Dr. Abdullahi in clinic today and was found to have excessive pedal edema.  Patient denies any shortness of breath, orthopnea, or paroxysmal nocturnal dyspnea.  Patient for Dr. Abdullahi's note, had been recently taken off of her diuretics by her primary care doctor, I do not see any documentation on the reason for removal from her diuretics.  Patient reportedly had a history of renal failure with diuretic therapy.    The patient has been having more difficulty ambulating due to lower extremity edema.          Review of Systems   Constitutional: Positive for activity change, appetite change and fatigue. Negative for chills and fever.   Respiratory: Negative for cough and shortness of breath.    Cardiovascular: Positive for leg swelling. Negative for chest pain and palpitations.   Gastrointestinal: Negative for abdominal distention, abdominal pain, diarrhea, nausea and vomiting.   Musculoskeletal: Negative for arthralgias and myalgias.   Neurological: Positive for weakness.   All other systems reviewed and are negative.       Otherwise complete ROS reviewed and negative except as mentioned in the HPI.    Past Medical History:   Past Medical History:   Diagnosis Date   • Bradycardia    • CAD in native artery    • Cardiac device in situ 03/29/2011    RCA stent   • Chest pain    • Diabetes mellitus type 2, uncomplicated (HCC)    • GERD (gastroesophageal reflux disease)    • Hard of hearing    • History of myocardial infarction 03/29/2011    EF 45% 2/11 cath   • Hyperlipemia, mixed    • Hypertension, benign    •  Palpitation    • SSS (sick sinus syndrome) ([facility])    • Status cardiac pacemaker    • Syncope      Past Surgical History:  Past Surgical History:   Procedure Laterality Date   • BLADDER SUSPENSION      with hysterectomy   • CARDIAC CATHETERIZATION     • CORONARY STENT PLACEMENT      x 1   • HYSTERECTOMY      total   • INSERT / REPLACE / REMOVE PACEMAKER     • PACEMAKER IMPLANTATION       Social History:  reports that she has never smoked. She has never used smokeless tobacco. She reports that she does not drink alcohol and does not use drugs.    Family History: family history includes Coronary artery disease in her mother; Heart attack in her mother.       Allergies:  Allergies   Allergen Reactions   • Metoclopramide Swelling     Legs swelled       Medications:  Prior to Admission medications    Medication Sig Start Date End Date Taking? Authorizing Provider   aspirin 81 MG tablet Take 81 mg by mouth daily.    Karey Salidvar MD   bumetanide (BUMEX) 1 MG tablet TAKE (1) TABLET BY MOUTH TWICE A DAY. 2/13/20   Douglas Abdullahi MD   carvedilol (COREG) 3.125 MG tablet TAKE (1) TABLET BY MOUTH TWICE A DAY. 11/6/21   Douglas Abdullahi MD   clopidogrel (PLAVIX) 75 MG tablet Take 75 mg by mouth daily.    Karey Saldivar MD   glimepiride (AMARYL) 1 MG tablet  12/3/21   Karey Saldivar MD   nitroglycerin (NITROSTAT) 0.4 MG SL tablet Place 0.4 mg under the tongue daily.    Karey Saldivar MD   omeprazole (PriLOSEC) 20 MG capsule Take 20 mg by mouth daily.    Karey Saldivar MD   Polyethylene Glycol 3350 (MIRALAX PO) Take  by mouth.    Karey Saldivar MD   rosuvastatin (CRESTOR) 10 MG tablet Take 10 mg by mouth. Monday, Wed, Fri    Karey Saldivar MD   glipizide (GLUCOTROL) 5 MG tablet Take 1 mg by mouth 2 (Two) Times a Day Before Meals.  12/15/21  Karey Saldivar MD     I have utilized all available immediate resources to obtain, update, and review the patient's current  "medications.    Objective     Vital Signs: /57 (BP Location: Right arm, Patient Position: Lying)   Pulse 64   Temp 98.4 °F (36.9 °C) (Oral)   Resp 16   Ht 165.1 cm (65\")   Wt 62.6 kg (138 lb)   SpO2 98%   BMI 22.96 kg/m²   Physical Exam  Vitals and nursing note reviewed.   Constitutional:       Appearance: Normal appearance.   HENT:      Head: Normocephalic and atraumatic.      Nose: No congestion or rhinorrhea.      Mouth/Throat:      Mouth: Mucous membranes are dry.      Pharynx: Oropharynx is clear.   Eyes:      General: No scleral icterus.     Conjunctiva/sclera: Conjunctivae normal.   Cardiovascular:      Rate and Rhythm: Normal rate and regular rhythm.      Heart sounds: Murmur heard.        Comments: JVD with significant hepatojugular reflux  Pulmonary:      Effort: Pulmonary effort is normal. No respiratory distress.      Breath sounds: Normal breath sounds. No stridor.   Abdominal:      General: Abdomen is flat. Bowel sounds are normal. There is no distension.      Palpations: Abdomen is soft. There is no mass.      Tenderness: There is no abdominal tenderness.      Hernia: No hernia is present.   Musculoskeletal:      Right lower leg: Edema (3+) present.      Left lower leg: Edema (3+) present.   Skin:     General: Skin is warm and dry.      Coloration: Skin is not jaundiced or pale.   Neurological:      General: No focal deficit present.      Mental Status: She is alert and oriented to person, place, and time.   Psychiatric:         Mood and Affect: Mood normal.         Behavior: Behavior normal.              Results Reviewed:  Lab Results (last 24 hours)     Procedure Component Value Units Date/Time    POC Glucose Once [[id number]]  (Normal) Collected: 12/15/21 1835    Specimen: Blood Updated: 12/15/21 1857     Glucose 111 mg/dL      Comment: : [id number] Justice AguilarMeter ID: [id number]       COVID PRE-OP / PRE-PROCEDURE SCREENING ORDER (NO ISOLATION) - Swab, Nasal Cavity [[id number]]  " (Normal) Collected: 12/15/21 1532    Specimen: Swab from Nasal Cavity Updated: 12/15/21 1626    Narrative:      The following orders were created for panel order COVID PRE-OP / PRE-PROCEDURE SCREENING ORDER (NO ISOLATION) - Swab, Nasal Cavity.  Procedure                               Abnormality         Status                     ---------                               -----------         ------                     COVID-19,[employer] IN-[facility]AY[facility]...[[id number]]  Normal              Final result                 Please view results for these tests on the individual orders.    COVID-19,[employer] IN-HOUSE,Nasal Swab No Transport Media 3-4 HR TAT - Swab, Nasal Cavity [[id number]]  (Normal) Collected: 12/15/21 1532    Specimen: Swab from Nasal Cavity Updated: 12/15/21 1626     COVID19 Not Detected    Narrative:      Fact sheet for providers: https://www.fda.gov/media/521373/download     Fact sheet for patients: https://www.fda.gov/media/811585/download    Test performed by PCR.    Consider negative results in combination with clinical observations, patient history, and epidemiological information.    Altha Draw [[id number]] Collected: 12/15/21 1439    Specimen: Blood Updated: 12/15/21 1545    Narrative:      The following orders were created for panel order Altha Draw.  Procedure                               Abnormality         Status                     ---------                               -----------         ------                     Green Top (Gel)[[id number]]                                  Final result               Lavender Top[[id number]]                                     Final result               Red Top[[id number]]                                          Final result               Light Blue Top[[id number]]                                   Final result                 Please view results for these tests on the individual orders.    Lavender Top [[id number]] Collected: 12/15/21 1439    Specimen: Blood Updated:  12/15/21 1545     Extra Tube hold for add-on     Comment: Auto resulted       Red Top [[id number]] Collected: 12/15/21 1439    Specimen: Blood Updated: 12/15/21 1545     Extra Tube Hold for add-ons.     Comment: Auto resulted.       Light Blue Top [[id number]] Collected: 12/15/21 1439    Specimen: Blood Updated: 12/15/21 1545     Extra Tube hold for add-on     Comment: Auto resulted       Green Top (Gel) [[id number]] Collected: 12/15/21 1439    Specimen: Blood Updated: 12/15/21 1545     Extra Tube Hold for add-ons.     Comment: Auto resulted.       Comprehensive Metabolic Panel [[id number]]  (Abnormal) Collected: 12/15/21 1439    Specimen: Blood Updated: 12/15/21 1532     Glucose 124 mg/dL      BUN 27 mg/dL      Creatinine 0.51 mg/dL      Sodium 137 mmol/L      Potassium 4.4 mmol/L      Chloride 102 mmol/L      CO2 28.0 mmol/L      Calcium 9.1 mg/dL      Total Protein 6.4 g/dL      Albumin 3.80 g/dL      ALT (SGPT) 15 U/L      AST (SGOT) 16 U/L      Alkaline Phosphatase 71 U/L      Total Bilirubin 0.3 mg/dL      eGFR Non African Amer 111 mL/min/1.73      Globulin 2.6 gm/dL      A/G Ratio 1.5 g/dL      BUN/Creatinine Ratio 52.9     Anion Gap 7.0 mmol/L     Narrative:      GFR Normal >60  Chronic Kidney Disease <60  Kidney Failure <15      BNP [[id number]]  (Normal) Collected: 12/15/21 1439    Specimen: Blood Updated: 12/15/21 1530     proBNP 798.7 pg/mL     Narrative:      Among patients with dyspnea, NT-proBNP is highly sensitive for the detection of acute congestive heart failure. In addition NT-proBNP of <300 pg/ml effectively rules out acute congestive heart failure with 99% negative predictive value.    Results may be falsely decreased if patient taking Biotin.      CBC & Differential [[id number]]  (Abnormal) Collected: 12/15/21 1439    Specimen: Blood Updated: 12/15/21 1518    Narrative:      The following orders were created for panel order CBC & Differential.  Procedure                               Abnormality          Status                     ---------                               -----------         ------                     CBC Auto Differential[[id number]]        Abnormal            Final result                 Please view results for these tests on the individual orders.    CBC Auto Differential [[id number]]  (Abnormal) Collected: 12/15/21 1439    Specimen: Blood Updated: 12/15/21 1518     WBC 7.51 10*3/mm3      RBC 3.81 10*6/mm3      Hemoglobin 10.8 g/dL      Hematocrit 35.1 %      MCV 92.1 fL      MCH 28.3 pg      MCHC 30.8 g/dL      RDW 14.6 %      RDW-SD 48.8 fl      MPV 9.1 fL      Platelets 303 10*3/mm3      Neutrophil % 66.7 %      Lymphocyte % 24.9 %      Monocyte % 5.7 %      Eosinophil % 1.6 %      Basophil % 0.3 %      Immature Grans % 0.8 %      Neutrophils, Absolute 5.01 10*3/mm3      Lymphocytes, Absolute 1.87 10*3/mm3      Monocytes, Absolute 0.43 10*3/mm3      Eosinophils, Absolute 0.12 10*3/mm3      Basophils, Absolute 0.02 10*3/mm3      Immature Grans, Absolute 0.06 10*3/mm3      nRBC 0.0 /100 WBC         Imaging Results (Last 24 Hours)     Procedure Component Value Units Date/Time    US Venous Doppler Lower Extremity Bilateral (duplex) [[id number]] Collected: 12/15/21 1941     Updated: 12/15/21 1944    Narrative:      History: Swelling       Impression:      Impression: There is no evidence of deep venous thrombosis or  superficial thrombophlebitis of right or left lower extremities.     Comments: Bilateral lower extremity venous duplex exam was performed  using color Doppler flow, Doppler waveform analysis, and grayscale  imaging, with and without compression. There is no evidence of deep  venous thrombosis in the common femoral, superficial femoral, popliteal,  peroneal, anterior tibial, and posterior tibial veins bilaterally. No  thrombus is identified in the saphenofemoral junctions and greater  saphenous veins bilaterally.         This report was finalized on 12/15/2021 19:41 by Dr. Vann  MD Chuck.    XR Chest 1 View [[id number]] Collected: 12/15/21 1635     Updated: 12/15/21 1641    Narrative:      Frontal upright radiograph of the chest 12/15/2021 4:29 PM CST     HISTORY: Fluid overload, pedal edema     COMPARISON: Chest exam dated 11/19/2021.     FINDINGS:      1.5 cm nodular opacity in the left lung base, just lateral to the left  heart border. Lungs are otherwise clear. No lung consolidation. No  pleural effusion or pneumothorax. Heart size is stable. Pulmonary  vasculature are nondilated. Left chest wall dual-chamber pacemaker. The  osseous structures and surrounding soft tissues demonstrate no acute  abnormality.       Impression:      1. No evidence of acute infiltrate or pulmonary edema.   2. There is a suspicious 1.5 cm nodular opacity in the left lung base.  This is not resolved from the earlier comparison exam. A small primary  lung malignancy is not excluded.        This report was finalized on 12/15/2021 16:38 by Dr Alex Hong, .        I have personally reviewed and interpreted the radiology studies and ECG obtained at time of admission.     Assessment / Plan     Assessment:   Active Hospital Problems    Diagnosis    • Bilateral lower extremity edema    • Lung nodule    • Acute on chronic diastolic heart failure (HCC)    • CAD (coronary artery disease)    • GERD (gastroesophageal reflux disease)    • HLD (hyperlipidemia)      Plan:      Admit to medicine    Furosemide 20 mg every 8 hours  Strict In/Out, daily weights    Repeat echo   Results for orders placed during the hospital encounter of 12/15/21    Adult Transthoracic Echo Complete W/ Cont if Necessary Per Protocol    Interpretation Summary  · Left ventricular systolic function is normal. Left ventricular ejection fraction appears to be 61 - 65%.  · Left ventricular wall thickness is consistent with mild concentric hypertrophy.  · Normal right ventricular cavity size and systolic function noted.  · No significant valvular  abnormalities identified on this study.     PT/OT for unna boots    SW consult for possible placement, will definitely at least need home health at discharge    Code Status/Advanced Care Plan: Full    The patient's surrogate decision maker is Oleg Andrade (POA, Son).     I discussed my findings and recommendations with the patient, son, bedside RN.    Estimated length of stay is 1-2 days.     The patient was seen and examined by me on 12/15/2021 at 1845.    Electronically signed by Darion Saunders MD, 12/15/21, 21:57 CST.

## 2021-12-16 NOTE — THERAPY DISCHARGE NOTE
Acute Care - Occupational Therapy Discharge  [facility]    Patient Name: Emiliano Recinos  : 1923    MRN: [id number]                              Today's Date: 2021       Admit Date: 12/15/2021    Visit Dx:     ICD-10-CM ICD-9-CM   1. Bilateral lower extremity edema  R60.0 782.3     Patient Active Problem List   Diagnosis   • CAD (coronary artery disease)   • SSS (sick sinus syndrome) ([facility])   • GERD (gastroesophageal reflux disease)   • HLD (hyperlipidemia)   • CAD in native artery   • Stented coronary artery   • Pedal edema   • NSVT (nonsustained ventricular tachycardia) ([facility])   • Bilateral lower extremity edema   • Lung nodule   • Acute on chronic diastolic heart failure ([facility])     Past Medical History:   Diagnosis Date   • Bradycardia    • CAD in native artery    • Cardiac device in situ 2011    RCA stent   • Chest pain    • Diabetes mellitus type 2, uncomplicated ([facility])    • GERD (gastroesophageal reflux disease)    • Hard of hearing    • History of myocardial infarction 2011    EF 45%  cath   • Hyperlipemia, mixed    • Hypertension, benign    • Palpitation    • SSS (sick sinus syndrome) ([facility])    • Status cardiac pacemaker    • Syncope      Past Surgical History:   Procedure Laterality Date   • BLADDER SUSPENSION      with hysterectomy   • CARDIAC CATHETERIZATION     • CORONARY STENT PLACEMENT      x 1   • HYSTERECTOMY      total   • INSERT / REPLACE / REMOVE PACEMAKER     • PACEMAKER IMPLANTATION        General Information     Row Name 21 0950          OT Time and Intention    Document Type evaluation  -MW     Mode of Treatment occupational therapy  -MW     Row Name 21 0950          General Information    Patient Profile Reviewed yes  -MW     Prior Level of Function independent:; all household mobility; min assist:; mod assist:; dressing; bathing; max assist:; cooking; cleaning; dependent:; driving  standard walker  -MW     Existing Precautions/Restrictions fall  -MW      Barriers to Rehab none identified  -     Row Name 12/16/21 0950          Occupational Profile    Reason for Services/Referral (Occupational Profile) Presented with B pedal edema to cardiology office and transferred to ED  -     Environmental Supports and Barriers (Occupational Profile) rollator is broken, lift chair, BSC, tub shower but not using - sponge bathing over last month, hospital bed,  -     Row Name 12/16/21 0950          Living Environment    Lives With alone  daughter and son assist 2-3x per day  -     Row Name 12/16/21 0950          Home Main Entrance    Number of Stairs, Main Entrance two  -MW     Stair Railings, Main Entrance railings on both sides of stairs  -     Row Name 12/16/21 0950          Stairs Within Home, Primary    Number of Stairs, Within Home, Primary one  -MW     Stair Railings, Within Home, Primary railing on left side (ascending)  -     Row Name 12/16/21 0950          Cognition    Orientation Status (Cognition) oriented x 4  -[facility] Name 12/16/21 0950          Safety Issues, Functional Mobility    Impairments Affecting Function (Mobility) pain  -           User Key  (r) = Recorded By, (t) = Taken By, (c) = Cosigned By    Initials Name Provider Type     Keesha Powell, OTR/L Occupational Therapist               Mobility/ADL's     Row Name 12/16/21 0950          Bed Mobility    Comment (Bed Mobility) sitting EOB upon entering room  -[facility] Name 12/16/21 0950          Transfers    Transfers sit-stand transfer  -     Sit-Stand [place] (Transfers) contact guard  -[facility] Name 12/16/21 0950          Sit-Stand Transfer    Assistive Device (Sit-Stand Transfers) walker, front-wheeled  -     Row Name 12/16/21 0950          Functional Mobility    Functional Mobility- Ind. Level contact guard assist  -     Functional Mobility- Device rolling walker  -     Functional Mobility- Comment amb from bed short distance in [place] and back to room  -     Row Name  12/16/21 0950          Activities of Daily Living    BADL Assessment/Intervention lower body dressing  -[facility] Name 12/16/21 0950          Lower Body Dressing Assessment/Training    [place] Level (Lower Body Dressing) don; doff; shoes/slippers; set up; contact guard assist  -MW     Position (Lower Body Dressing) edge of bed sitting  -           User Key  (r) = Recorded By, (t) = Taken By, (c) = Cosigned By    Initials Name Provider Type    MW Keesha Powell, OTR/L Occupational Therapist               Obj/Interventions     [facility] Name 12/16/21 0950          Sensory Interventions    Comment, Sensory Intervention reports diminished sensation in B fingers  -[facility] Name 12/16/21 0950          Vision Assessment/Intervention    Visual Impairment/Limitations corrective lenses full-time  -MW     Row Name 12/16/21 0950          Range of Motion Comprehensive    General Range of Motion bilateral upper extremity ROM WFL  -MW     Row Name 12/16/21 0950          Strength Comprehensive (MMT)    Comment, General Manual Muscle Testing (MMT) Assessment BUE grossly 3+ to 4-/5 within available range  -MW     Row Name 12/16/21 0950          Balance    Balance Assessment sitting static balance; sitting dynamic balance; standing static balance; standing dynamic balance  -     Static Sitting Balance WFL; unsupported; sitting, edge of bed  -MW     Dynamic Sitting Balance WFL; unsupported; sitting, edge of bed  -MW     Static Standing Balance mild impairment; supported  -MW     Dynamic Standing Balance mild impairment; supported  -MW           User Key  (r) = Recorded By, (t) = Taken By, (c) = Cosigned By    Initials Name Provider Type    MW Keesha Powell, OTR/L Occupational Therapist               Goals/Plan    No documentation.                Clinical Impression     [facility] Name 12/16/21 0950          Pain Assessment    Additional Documentation Pain Scale: Numbers Pre/Post-Treatment (Group)  -[facility] Name 12/16/21 0950           Pain Scale: Numbers Pre/Post-Treatment    Pretreatment Pain Rating 10/10  -MW     Posttreatment Pain Rating 10/10  -MW     Pain Location - Side Right  -MW     Pain Location knee  -MW     Pain Intervention(s) Repositioned; Ambulation/increased activity  -     Row Name 12/16/21 2570          Plan of Care Review    Plan of Care Reviewed With patient; daughter  -MW     Progress no change  -MW     Outcome Summary OT eval completed. Pt awake and alert sitting EOB with daughter upon entering room. Pt with B LE edema and R knee pain which is chronic. Pt demos limited shoulder ROM from chronic rotator cuff injuries and grossly weak BUE. Reports diminished sensation in B hands which is chronic over last couple of months. Pt demos mild dynamic sitting and standing deficits in context of LB ADL and fxl mobility, use of RW and CGA to maintain balance. Daughter and pt both feel pt is at her baseline, however she does demo decreased balance and endurance. Will defer to PT for cont therapy while at hospital as pt is at reported functional baseline and PT to assess wound care/unna boots daily at this time. ANticipate d/c home w assist and HH.  -     Row Name 12/16/21 1158          Therapy Assessment/Plan (OT)    Criteria for Skilled Therapeutic Interventions Met (OT) no; no problems identified which require skilled intervention  -     Therapy Frequency (OT) evaluation only  -     Row Name 12/16/21 4207          Therapy Plan Review/Discharge Plan (OT)    Anticipated Discharge Disposition (OT) home with assist; home with home health  -     Row Name 12/16/21 0915          Vital Signs    O2 Delivery Pre Treatment room air  -MW     O2 Delivery Intra Treatment room air  -MW     O2 Delivery Post Treatment room air  -MW     Pre Patient Position Sitting  -MW     Intra Patient Position Standing  -MW     Post Patient Position Sitting  -MW     Row Name 12/16/21 0963          Positioning and Restraints    Pre-Treatment Position  in bed  -MW     Post Treatment Position bed  -MW     In Bed supine; call light within reach; encouraged to call for assist; with PT  -MW           User Key  (r) = Recorded By, (t) = Taken By, (c) = Cosigned By    Initials Name Provider Type    RANDY Keesha Powell, OTR/L Occupational Therapist               Outcome Measures     Row Name 12/16/21 0950          How much help from another is currently needed...    Putting on and taking off regular lower body clothing? 2  -MW     Bathing (including washing, rinsing, and drying) 2  -MW     Toileting (which includes using toilet bed pan or urinal) 3  -MW     Putting on and taking off regular upper body clothing 3  -MW     Taking care of personal grooming (such as brushing teeth) 4  -MW     Eating meals 4  -MW     AM-PAC 6 Clicks Score (OT) 18  -MW     Row Name 12/16/21 0950 12/16/21 0945       Functional Assessment    Outcome Measure Options AM-PAC 6 Clicks Daily Activity (OT)  -MW AM-PAC 6 Clicks Basic Mobility (PT)  -SB          User Key  (r) = Recorded By, (t) = Taken By, (c) = Cosigned By    Initials Name Provider Type    RANDY Keesha Powell, OTR/L Occupational Therapist    SB Lidia Maldonado, PT DPT Physical Therapist              Occupational Therapy Education                 Title: PT OT SLP Therapies (In Progress)     Topic: Occupational Therapy (Done)     Point: ADL training (Done)     Description:   Instruct learner(s) on proper safety adaptation and remediation techniques during self care or transfers.   Instruct in proper use of assistive devices.              Learning Progress Summary           Patient Acceptance, E,D, VU by RANDY at 12/16/2021 1151   Family Acceptance, E,D, VU by  at 12/16/2021 1151                   Point: Home exercise program (Done)     Description:   Instruct learner(s) on appropriate technique for monitoring, assisting and/or progressing therapeutic exercises/activities.              Learning Progress Summary           Patient Acceptance,  E,D, VU by RANDY at 12/16/2021 1151   Family Acceptance, E,D, VU by RANDY at 12/16/2021 1151                               User Key     Initials Effective Dates Name Provider Type Discipline    RANDY 08/28/18 -  Keesha Powell, OTR/L Occupational Therapist OT              OT Recommendation and Plan  Retired Outcome Summary/Treatment Plan (OT)  Anticipated Discharge Disposition (OT): home with assist, home with home health  Therapy Frequency (OT): evaluation only  Plan of Care Review  Plan of Care Reviewed With: patient, daughter  Progress: no change  Outcome Summary: OT eval completed. Pt awake and alert sitting EOB with daughter upon entering room. Pt with B LE edema and R knee pain which is chronic. Pt demos limited shoulder ROM from chronic rotator cuff injuries and grossly weak BUE. Reports diminished sensation in B hands which is chronic over last couple of months. Pt demos mild dynamic sitting and standing deficits in context of LB ADL and fxl mobility, use of RW and CGA to maintain balance. Daughter and pt both feel pt is at her baseline, however she does demo decreased balance and endurance. Will defer to PT for cont therapy while at hospital as pt is at reported functional baseline and PT to assess wound care/unna boots daily at this time. ANticipate d/c home w assist and HH.  Plan of Care Reviewed With: patient, daughter  Outcome Summary: OT eval completed. Pt awake and alert sitting EOB with daughter upon entering room. Pt with B LE edema and R knee pain which is chronic. Pt demos limited shoulder ROM from chronic rotator cuff injuries and grossly weak BUE. Reports diminished sensation in B hands which is chronic over last couple of months. Pt demos mild dynamic sitting and standing deficits in context of LB ADL and fxl mobility, use of RW and CGA to maintain balance. Daughter and pt both feel pt is at her baseline, however she does demo decreased balance and endurance. Will defer to PT for cont therapy while at  hospital as pt is at reported functional baseline and PT to assess wound care/unna boots daily at this time. ANticipate d/c home w assist and HH.     Time Calculation:    Time Calculation- OT     Row Name 12/16/21 1151             Time Calculation- OT    OT Start Time 0950  +10 min chart review  -MW      OT Stop Time 1035  -MW      OT Time Calculation (min) 45 min  -MW      OT Received On 12/16/21  -MW            User Key  (r) = Recorded By, (t) = Taken By, (c) = Cosigned By    Initials Name Provider Type    Keesha Greene, OTR/L Occupational Therapist              Therapy Charges for Today     Code Description Service Date Service Provider Modifiers Qty    [id number] HC OT EVAL LOW COMPLEXITY 4 12/16/2021 Keesha Powell OTR/L GO 1               DEA Medrano/EVELINE  12/16/2021

## 2021-12-16 NOTE — CASE MANAGEMENT/SOCIAL WORK
Discharge Planning Assessment  [facility]     Patient Name: Emiliano Recinos  MRN: [id number]  Today's Date: 12/16/2021    Admit Date: 12/15/2021     Discharge Needs Assessment     Row Name 12/16/21 1336       Living Environment    Lives With alone    Current Living Arrangements home/apartment/condo    Primary Care Provided by self    Provides Primary Care For no one    Family Caregiver if Needed child(chandu), adult    Quality of Family Relationships helpful; involved; supportive    Able to Return to Prior Arrangements yes       Resource/Environmental Concerns    Resource/Environmental Concerns none       Transition Planning    Patient/Family Anticipates Transition to home    Patient/Family Anticipated Services at Transition none    Transportation Anticipated family or friend will provide       Discharge Needs Assessment    Readmission Within the Last 30 Days no previous admission in last 30 days    Equipment Currently Used at Home cane, quad; commode; walker, standard; wheelchair  life chair    Concerns to be Addressed denies needs/concerns at this time    Anticipated Changes Related to Illness none    Equipment Needed After Discharge none    Current Discharge Risk lives alone    Discharge Coordination/Progress Pt lives at home alone and plans to return home at d/c. She has the dme at home that she needs except she is asking for a walker with a seat. However, she got a w/c less than five years ago so insurance will not pay for a walker. Informed pt. She does have rx coverage for her meds. Pt denies needs at this time.               Discharge Plan    No documentation.               Continued Care and Services - Admitted Since 12/15/2021    Coordination has not been started for this encounter.          Demographic Summary    No documentation.                Functional Status    No documentation.                Psychosocial    No documentation.                Abuse/Neglect    No documentation.                Legal    No  documentation.                Substance Abuse    No documentation.                Patient Forms    No documentation.                   KELSEY Love

## 2021-12-16 NOTE — PLAN OF CARE
Goal Outcome Evaluation:  Plan of Care Reviewed With: patient        Progress: improving  Outcome Summary: Pt has no c/o pain this shift. IV lasix given, pt has been voiding large amounts. BLE edema, +4. Voiding per BSC. [place]. Tele. VSS. safety maintained

## 2021-12-16 NOTE — PLAN OF CARE
Goal Outcome Evaluation:  Plan of Care Reviewed With: patient, daughter        Progress: no change  Outcome Summary: OT lucho completed. Pt awake and alert sitting EOB with daughter upon entering room. Pt with B LE edema and R knee pain which is chronic. Pt demos limited shoulder ROM from chronic rotator cuff injuries and grossly weak BUE. Reports diminished sensation in B hands which is chronic over last couple of months. Pt demos mild dynamic sitting and standing deficits in context of LB ADL and fxl mobility, use of RW and CGA to maintain balance. Daughter and pt both feel pt is at her baseline, however she does demo decreased balance and endurance. Will defer to PT for cont therapy while at hospital as pt is at reported functional baseline and PT to assess wound care/unna boots daily at this time. ANticipate d/c home w assist and HH.

## 2021-12-16 NOTE — PLAN OF CARE
Goal Outcome Evaluation:  Plan of Care Reviewed With: patient        Progress: improving  Outcome Summary: Pt has been resting well today. Accucheck achs with SSI given see MAR. IV lasix given, large amount of output noted, voiding per BSC. Unna boots placed per PT. PT/OT, tele-NSR, IID, VSS, safety maintained

## 2021-12-16 NOTE — THERAPY WOUND CARE TREATMENT
Acute Care - Wound/Debridement Initial Evaluation  [facility]     Patient Name: Emiliano Recinos  : 1923  MRN: [id number]  Today's Date: 2021         Referring Physician: Dr. Saunders      Admit Date: 12/15/2021    Visit Dx:    ICD-10-CM ICD-9-CM   1. Bilateral lower extremity edema  R60.0 782.3   2. Impaired mobility  Z74.09 799.89       Patient Active Problem List   Diagnosis   • CAD (coronary artery disease)   • SSS (sick sinus syndrome) ([facility])   • GERD (gastroesophageal reflux disease)   • HLD (hyperlipidemia)   • CAD in native artery   • Stented coronary artery   • Pedal edema   • NSVT (nonsustained ventricular tachycardia) ([facility])   • Bilateral lower extremity edema   • Lung nodule   • Acute on chronic diastolic heart failure ([facility])        Past Medical History:   Diagnosis Date   • Bradycardia    • CAD in native artery    • Cardiac device in situ 2011    RCA stent   • Chest pain    • Diabetes mellitus type 2, uncomplicated ([facility])    • GERD (gastroesophageal reflux disease)    • Hard of hearing    • History of myocardial infarction 2011    EF 45%  cath   • Hyperlipemia, mixed    • Hypertension, benign    • Palpitation    • SSS (sick sinus syndrome) ([facility])    • Status cardiac pacemaker    • Syncope         Past Surgical History:   Procedure Laterality Date   • BLADDER SUSPENSION      with hysterectomy   • CARDIAC CATHETERIZATION     • CORONARY STENT PLACEMENT      x 1   • HYSTERECTOMY      total   • INSERT / REPLACE / REMOVE PACEMAKER     • PACEMAKER IMPLANTATION             Wound 21 1000 Left medial heel (Active)   Wound Image    21 0944   Care, Wound an boot 21 0944       Wound 21 1000 Right lateral malleolus (Active)   Wound Image   21 0944   Care, Wound an boot 21 0944         WOUND DEBRIDEMENT                     PT Assessment (last 12 hours)     PT Evaluation and Treatment     Row Name 21          Physical Therapy Time and Intention     Subjective Information complains of; pain  -SB     Document Type evaluation; wound care  -SB     Mode of Treatment physical therapy  -SB     Row Name 12/16/21 0944          General Information    Patient Profile Reviewed yes  -SB     Referring Physician Dr. Saunders  -     Pertinent History of Current Functional Problem dx: BLE edema  -SB     Row Name 12/16/21 0944          Sensory Assessment (Somatosensory)    Sensory Assessment (Somatosensory) sensation intact  -SB     Row Name 12/16/21 0944          Cognition    Orientation Status (Cognition) oriented x 4  -SB     Row Name 12/16/21 0944          Pain    Additional Documentation Pain Scale: Numbers Pre/Post-Treatment (Group)  -SB     Row Name 12/16/21 0944          Pain Scale: Numbers Pre/Post-Treatment    Pretreatment Pain Rating 10/10  -SB     Pain Location - Side Right  -SB     Pain Location knee  -SB     Row Name 12/16/21 0944          Pain Scale: Word Pre/Post-Treatment    Pain Intervention(s) Medication (See MAR); Repositioned; Ambulation/increased activity  -SB     Row Name 12/16/21 0944          Edema Assessment & Management    Location (Edema) lower extremity, left; lower extremity, right  -SB     Additional Documentation Location (Edema) (Row)  -SB     Row Name 12/16/21 0944          Lower Extremity Edema Measures, Left    Lower Extremity, Left (Edema) mid-calf  -SB     Mid-Calf Circumference, Left (Edema) --  2+  -SB     Row Name 12/16/21 0944          Lower Extremity Edema Measures, Right    Lower Extremity, Right (Edema) mid-calf  -SB     Mid-Calf Circumference, Right (Edema) --  3-4+  -SB     Row Name 12/16/21 0944          Health Promotion    Additional Documentation Wound (LDA)  -SB     Row Name 12/16/21 0944          Wound 12/16/21 1000 Left medial heel    Wound - Properties Group Placement Date: 12/16/21  -SB Placement Time: 1000  -SB Side: Left  -SB Orientation: medial  -SB Location: heel  -SB     Wound Image  View All Images View Images  -SB      Care, Wound an boot  -SB     Retired Wound - Properties Group Date first assessed: 12/16/21  -SB Time first assessed: 1000  -SB Side: Left  -SB Location: heel  -SB     Row Name 12/16/21 0944          Wound 12/16/21 1000 Right lateral malleolus    Wound - Properties Group Placement Date: 12/16/21  -SB Placement Time: 1000  -SB Side: Right  -SB Orientation: lateral  -SB Location: malleolus  -SB     Wound Image  View All Images View Images  -SB     Care, Wound an boot  -SB     Retired Wound - Properties Group Date first assessed: 12/16/21  -SB Time first assessed: 1000  -SB Side: Right  -SB Location: malleolus  -SB     Row Name 12/16/21 0944          Plan of Care Review    Plan of Care Reviewed With patient  -SB     Progress no change  -SB     Outcome Summary PT wound eval completed for application of unna boots to BLE. Pt with BLE edema, R>L and reports this has been an ongoing concern. Pt with intact cap refill, sensation and dorsalis pedis pulse pre and post application of unna boots. BLE wrapped from met heads to tib tuberosity with unna boots and then coban. PT will check dressing daily and change in 5-7 days or sooner per MD.  -SB     Row Name 12/16/21 0944          Physical Therapy Goals    Wound Care Goal Selection (PT) wound care, PT goal 1; wound care, PT goal 2  -SB     Row Name 12/16/21 0944          Wound Care Goal 1 (PT)    Wound Care Goal 1 (PT) Pt will tolerate unna boots to BLE to improve skin integrity and manage LE edema  -SB     Time Frame (Wound Care Goal 1, PT) long term goal (LTG)  -SB     Progress/Outcome (Wound Care Goal 1, PT) goal ongoing  -SB     Row Name 12/16/21 0944          Positioning and Restraints    Pre-Treatment Position in bed  -SB     Post Treatment Position bed  -SB     In Bed supine; fowlers; call light within reach; encouraged to call for assist; with family/caregiver; exit alarm on; side rails up x3; legs elevated; heels elevated  -SB     Row Name 12/16/21 0944           Therapy Assessment/Plan (PT)    Patient/Family Therapy Goals Statement (PT) dec leg swelling  -SB     Rehab Potential (PT) good, to achieve stated therapy goals  -SB     Criteria for Skilled Interventions Met (PT) yes; meets criteria; skilled treatment is necessary  -SB     Predicted Duration of Therapy Intervention (PT) until d/c or goals achieved  -SB           User Key  (r) = Recorded By, (t) = Taken By, (c) = Cosigned By    Initials Name Provider Type    Lidia Kaufman, PT DPT Physical Therapist              Physical Therapy Education                 Title: PT OT SLP Therapies (In Progress)     Topic: Physical Therapy (In Progress)     Point: Mobility training (Done)     Learning Progress Summary           Patient Acceptance, E, VU by SB at 12/16/2021 1004    Comment: pt edu on POC, benefits of act, d/c plans, unna boots purpose                   Point: Home exercise program (Not Started)     Learner Progress:  Not documented in this visit.          Point: Body mechanics (Done)     Learning Progress Summary           Patient Acceptance, E, VU by SB at 12/16/2021 1004    Comment: pt edu on POC, benefits of act, d/c plans, unna boots purpose                   Point: Precautions (Done)     Learning Progress Summary           Patient Acceptance, E, VU by SB at 12/16/2021 1004    Comment: pt edu on POC, benefits of act, d/c plans, unna boots purpose                               User Key     Initials Effective Dates Name Provider Type Discipline     06/16/21 -  Lidia Maldonado, PT DPT Physical Therapist PT                Recommendation and Plan  Anticipated Equipment Needs at Discharge (PT): four wheeled walker  Anticipated Discharge Disposition (PT): home with assist, home with home health  Planned Therapy Interventions (PT): balance training, bed mobility training, gait training, home exercise program, patient/family education, wound care, transfer training, strengthening, other (see comments), stair training  (edema management)  Therapy Frequency (PT): 2 times/day  Plan of Care Reviewed With: patient   Progress: no change       Progress: no change  Outcome Summary: PT eval completed. Pt alert and oriented x4, sitting EOB with dtr present upon arrival. Pt with c/o R knee pain and BLE edema. Pt with dec strength in BLE, with greater deficits in RLE due to knee pain. Pt performed sit to stand t/f and gait training with CGA and RW. Pt with significant shuffled gait pattern, with forward flexed posture and dec gait speed. Able to take bigger steps when cued but then returns back to shuffled steps. Pt reqd cues for proper AD placement. Pt typically walks household distances with use of walker and without assistance. Pt will benefit from skilled PT to improve fxl mobility, endurance and safety. Recommend d/c home with assist and HH. Pt would also benefit from rollator at d/c as she currently only has a standard walker.  Plan of Care Reviewed With: patient            Time Calculation   PT Charges     Row Name 12/16/21 1203             Time Calculation    Start Time 0944  -SB      Stop Time 1057  -SB      Time Calculation (min) 73 min  -SB      PT Received On 12/16/21  -SB      PT Goal Re-Cert Due Date 12/26/21  -SB            User Key  (r) = Recorded By, (t) = Taken By, (c) = Cosigned By    Initials Name Provider Type    Lidia Kaufman, PT DPT Physical Therapist                  Therapy Charges for Today     Code Description Service Date Service Provider Modifiers Qty    61131205270 HC PT EVAL LOW COMPLEXITY 4 12/16/2021 Lidia Maldonado, PT DPT GP 1    14212847472 HC PT STAPPING UNNA BOOT 12/16/2021 Lidia Maldonado, PT DPT GP 2            PT G-Codes  Outcome Measure Options: AM-PAC 6 Clicks Daily Activity (OT)  AM-PAC 6 Clicks Score (PT): 18  AM-PAC 6 Clicks Score (OT): 18       Lidia Maldonado PT DPT  12/16/2021

## 2021-12-16 NOTE — PLAN OF CARE
Goal Outcome Evaluation:  Plan of Care Reviewed With: patient        Progress: no change  Outcome Summary: Pt admitted to 3C from ED for BLE edema. Pt has pitting edema noted to lower legs, ankles, and feet. Pt [place], hearing aids in place. VSS, safety maintained

## 2021-12-16 NOTE — THERAPY EVALUATION
Patient Name: Emiliano Recinos  : 1923    MRN: [id number]                              Today's Date: 2021       Admit Date: 12/15/2021    Visit Dx:     ICD-10-CM ICD-9-CM   1. Bilateral lower extremity edema  R60.0 782.3   2. Impaired mobility  Z74.09 799.89     Patient Active Problem List   Diagnosis   • CAD (coronary artery disease)   • SSS (sick sinus syndrome) ([facility])   • GERD (gastroesophageal reflux disease)   • HLD (hyperlipidemia)   • CAD in native artery   • Stented coronary artery   • Pedal edema   • NSVT (nonsustained ventricular tachycardia) ([facility])   • Bilateral lower extremity edema   • Lung nodule   • Acute on chronic diastolic heart failure ([facility])     Past Medical History:   Diagnosis Date   • Bradycardia    • CAD in native artery    • Cardiac device in situ 2011    RCA stent   • Chest pain    • Diabetes mellitus type 2, uncomplicated ([facility])    • GERD (gastroesophageal reflux disease)    • Hard of hearing    • History of myocardial infarction 2011    EF 45%  cath   • Hyperlipemia, mixed    • Hypertension, benign    • Palpitation    • SSS (sick sinus syndrome) ([facility])    • Status cardiac pacemaker    • Syncope      Past Surgical History:   Procedure Laterality Date   • BLADDER SUSPENSION      with hysterectomy   • CARDIAC CATHETERIZATION     • CORONARY STENT PLACEMENT      x 1   • HYSTERECTOMY      total   • INSERT / REPLACE / REMOVE PACEMAKER     • PACEMAKER IMPLANTATION        General Information     Row Name 21 0945          Physical Therapy Time and Intention    Document Type evaluation  -SB     Mode of Treatment physical therapy  -SB     Row Name 21 0945          General Information    Patient Profile Reviewed yes  -SB     Prior Level of Function independent:; all household mobility; ADL's; min assist:; mod assist:; dressing; bathing; max assist:; cooking; cleaning; dependent:; driving  dtr and son assist patient with ADLs and cooking/cleaning/driving; fredrick  walker, BSC, lift chair, step over tub, hospital bed  -SB     Existing Precautions/Restrictions fall  -SB     Barriers to Rehab none identified  -SB     Row Name 12/16/21 0945          Living Environment    Lives With alone  -SB     Row Name 12/16/21 0945          Home Main Entrance    Number of Stairs, Main Entrance two  -SB     Stair Railings, Main Entrance railings on both sides of stairs  -SB     Row Name 12/16/21 0945          Stairs Within Home, Primary    Number of Stairs, Within Home, Primary one  -SB     Stair Railings, Within Home, Primary railing on left side (ascending)  -SB     Row Name 12/16/21 0945          Cognition    Orientation Status (Cognition) oriented x 4  -SB     Row Name 12/16/21 0945          Safety Issues, Functional Mobility    Impairments Affecting Function (Mobility) pain; balance; strength; endurance/activity tolerance  -SB           User Key  (r) = Recorded By, (t) = Taken By, (c) = Cosigned By    Initials Name Provider Type    SB Lidia Maldonado PT DPT Physical Therapist               Mobility     Row Name 12/16/21 0945          Bed Mobility    Bed Mobility sit-supine  -SB     Sit-Supine [place] (Bed Mobility) verbal cues; contact guard; nonverbal cues (demo/gesture)  -SB     Comment (Bed Mobility) sitting EOB upon arrival  -SB     Row Name 12/16/21 0945          Sit-Stand Transfer    Sit-Stand [place] (Transfers) contact guard  -SB     Assistive Device (Sit-Stand Transfers) walker, front-wheeled  -SB     Row Name 12/16/21 0945          Gait/Stairs (Locomotion)    [place] Level (Gait) contact guard  -SB     Assistive Device (Gait) walker, front-wheeled  -SB     Distance in Feet (Gait) 60  -SB     Deviations/Abnormal Patterns (Gait) gait speed decreased; festinating/shuffling; stride length decreased  -SB     Bilateral Gait Deviations forward flexed posture; heel strike decreased  -SB           User Key  (r) = Recorded By, (t) = Taken By, (c) = Cosigned By    Initials  Name Provider Type    Lidia Kaufman PT DPT Physical Therapist               Obj/Interventions     Row Name 12/16/21 0945          Range of Motion Comprehensive    General Range of Motion bilateral lower extremity ROM WFL  -SB     Row Name 12/16/21 0945          Strength Comprehensive (MMT)    General Manual Muscle Testing (MMT) Assessment lower extremity strength deficits identified  -SB     Comment, General Manual Muscle Testing (MMT) Assessment B hip flex 4-/5, knee ext and DF 4/5  -SB     Row Name 12/16/21 0945          Balance    Balance Assessment sitting static balance; sitting dynamic balance; standing static balance; standing dynamic balance  -SB     Static Sitting Balance WFL; unsupported; sitting, edge of bed  -SB     Dynamic Sitting Balance WFL; sitting, edge of bed; unsupported  -SB     Static Standing Balance mild impairment; supported; standing  -SB     Dynamic Standing Balance mild impairment; supported; standing  -SB     Row Name 12/16/21 0945          Sensory Assessment (Somatosensory)    Sensory Assessment (Somatosensory) LE sensation intact  -SB           User Key  (r) = Recorded By, (t) = Taken By, (c) = Cosigned By    Initials Name Provider Type    Lidia Kaufman PT DPT Physical Therapist               Goals/Plan     Row Name 12/16/21 0945          Bed Mobility Goal 1 (PT)    Activity/Assistive Device (Bed Mobility Goal 1, PT) sit to supine; supine to sit; rolling to right; rolling to left  -SB     [place] Level/Cues Needed (Bed Mobility Goal 1, PT) modified independence  -SB     Time Frame (Bed Mobility Goal 1, PT) long term goal (LTG)  -SB     Progress/Outcomes (Bed Mobility Goal 1, PT) goal ongoing  -SB     Row Name 12/16/21 0945          Transfer Goal 1 (PT)    Activity/Assistive Device (Transfer Goal 1, PT) sit-to-stand/stand-to-sit; bed-to-chair/chair-to-bed; walker, rolling  -SB     [place] Level/Cues Needed (Transfer Goal 1, PT) standby assist  -SB     Time Frame  (Transfer Goal 1, PT) long term goal (LTG)  -SB     Progress/Outcome (Transfer Goal 1, PT) goal ongoing  -SB     Row Name 12/16/21 0901          Gait Training Goal 1 (PT)    Activity/Assistive Device (Gait Training Goal 1, PT) gait (walking locomotion); increase endurance/gait distance; diminish gait deviation; decrease fall risk; improve balance and speed; assistive device use; increase energy conservation; walker, rolling  -SB     [place] Level (Gait Training Goal 1, PT) standby assist  -SB     Distance (Gait Training Goal 1, PT) 100  -SB     Time Frame (Gait Training Goal 1, PT) long term goal (LTG)  -SB     Progress/Outcome (Gait Training Goal 1, PT) goal ongoing  -SB           User Key  (r) = Recorded By, (t) = Taken By, (c) = Cosigned By    Initials Name Provider Type    Lidia Kaufman, PT DPT Physical Therapist               Clinical Impression     Row Name 12/16/21 3309          Pain    Additional Documentation Pain Scale: Numbers Pre/Post-Treatment (Group)  -SB     Row Name 12/16/21 0410          Pain Scale: Numbers Pre/Post-Treatment    Pretreatment Pain Rating 10/10  -SB     Posttreatment Pain Rating 10/10  -SB     Pain Location - Side Right  -SB     Pain Location knee  -SB     Pain Intervention(s) Medication (See MAR); Repositioned; Ambulation/increased activity  -SB     Row Name 12/16/21 4410          Plan of Care Review    Plan of Care Reviewed With patient  -SB     Progress no change  -SB     Outcome Summary PT eval completed. Pt alert and oriented x4, sitting EOB with dtr present upon arrival. Pt with c/o R knee pain and BLE edema. Pt with dec strength in BLE, with greater deficits in RLE due to knee pain. Pt performed sit to stand t/f and gait training with CGA and RW. Pt with significant shuffled gait pattern, with forward flexed posture and dec gait speed. Able to take bigger steps when cued but then returns back to shuffled steps. Pt reqd cues for proper AD placement. Pt typically walks  household distances with use of walker and without assistance. Pt will benefit from skilled PT to improve fxl mobility, endurance and safety. Recommend d/c home with assist and HH. Pt would also benefit from rollator at d/c as she currently only has a standard walker.  -SB     Row Name 12/16/21 0945          Therapy Assessment/Plan (PT)    Patient/Family Therapy Goals Statement (PT) improve function  -SB     Rehab Potential (PT) good, to achieve stated therapy goals  -SB     Criteria for Skilled Interventions Met (PT) yes; meets criteria; skilled treatment is necessary  -SB     Predicted Duration of Therapy Intervention (PT) until d/c or goals achieved  -SB     Row Name 12/16/21 0945          Vital Signs    O2 Delivery Pre Treatment room air  -SB     O2 Delivery Post Treatment room air  -SB     Row Name 12/16/21 0945          Positioning and Restraints    Pre-Treatment Position in bed  -SB     Post Treatment Position bed  -SB     In Bed supine; fowlers; call light within reach; encouraged to call for assist; exit alarm on; side rails up x3; heels elevated; legs elevated  -SB           User Key  (r) = Recorded By, (t) = Taken By, (c) = Cosigned By    Initials Name Provider Type    Lidia Kaufman, PT DPT Physical Therapist               Outcome Measures     Row Name 12/16/21 0945          How much help from another person do you currently need...    Turning from your back to your side while in flat bed without using bedrails? 3  -SB     Moving from lying on back to sitting on the side of a flat bed without bedrails? 3  -SB     Moving to and from a bed to a chair (including a wheelchair)? 3  -SB     Standing up from a chair using your arms (e.g., wheelchair, bedside chair)? 3  -SB     Climbing 3-5 steps with a railing? 3  -SB     To walk in hospital room? 3  -SB     AM-PAC 6 Clicks Score (PT) 18  -SB     Row Name 12/16/21 0950 12/16/21 0945       Functional Assessment    Outcome Measure Options AM-PAC 6 Clicks Daily  Activity (OT)  -MW AM-PAC 6 Clicks Basic Mobility (PT)  -          User Key  (r) = Recorded By, (t) = Taken By, (c) = Cosigned By    Initials Name Provider Type    MW Keesha Powell, OTR/L Occupational Therapist    Lidia Kaufman, PT DPT Physical Therapist                             Physical Therapy Education                 Title: PT OT SLP Therapies (In Progress)     Topic: Physical Therapy (In Progress)     Point: Mobility training (Done)     Learning Progress Summary           Patient Acceptance, E, VU by SB at 12/16/2021 1004    Comment: pt edu on POC, benefits of act, d/c plans, unna boots purpose                   Point: Home exercise program (Not Started)     Learner Progress:  Not documented in this visit.          Point: Body mechanics (Done)     Learning Progress Summary           Patient Acceptance, E, VU by SB at 12/16/2021 1004    Comment: pt edu on POC, benefits of act, d/c plans, unna boots purpose                   Point: Precautions (Done)     Learning Progress Summary           Patient Acceptance, E, VU by SB at 12/16/2021 1004    Comment: pt edu on POC, benefits of act, d/c plans, unna boots purpose                               User Key     Initials Effective Dates Name Provider Type Discipline     06/16/21 -  Lidia Maldonado, PT DPT Physical Therapist PT              PT Recommendation and Plan  Planned Therapy Interventions (PT): balance training, bed mobility training, gait training, home exercise program, patient/family education, wound care, transfer training, strengthening, other (see comments), stair training (edema management)  Plan of Care Reviewed With: patient  Progress: no change  Outcome Summary: PT eval completed. Pt alert and oriented x4, sitting EOB with dtr present upon arrival. Pt with c/o R knee pain and BLE edema. Pt with dec strength in BLE, with greater deficits in RLE due to knee pain. Pt performed sit to stand t/f and gait training with CGA and RW. Pt with  significant shuffled gait pattern, with forward flexed posture and dec gait speed. Able to take bigger steps when cued but then returns back to shuffled steps. Pt reqd cues for proper AD placement. Pt typically walks household distances with use of walker and without assistance. Pt will benefit from skilled PT to improve fxl mobility, endurance and safety. Recommend d/c home with assist and HH. Pt would also benefit from rollator at d/c as she currently only has a standard walker.     Time Calculation:    PT Charges     Row Name 12/16/21 1203             Time Calculation    Start Time 0944  -SB      Stop Time 1057  -SB      Time Calculation (min) 73 min  -SB      PT Received On 12/16/21  -SB      PT Goal Re-Cert Due Date 12/26/21  -SB            User Key  (r) = Recorded By, (t) = Taken By, (c) = Cosigned By    Initials Name Provider Type    SB Lidia Maldonado, PT DPT Physical Therapist              Therapy Charges for Today     Code Description Service Date Service Provider Modifiers Qty    76876255442 HC PT EVAL LOW COMPLEXITY 4 12/16/2021 Lidia Maldonado PT DPT GP 1    [id number] HC PT STAPPING UNNA BOOT 12/16/2021 Lidia Maldonado, PT DPT GP 2          PT G-Codes  Outcome Measure Options: AM-PAC 6 Clicks Daily Activity (OT)  AM-PAC 6 Clicks Score (PT): 18  AM-PAC 6 Clicks Score (OT): 18    MARIANO PugaT  12/16/2021

## 2021-12-16 NOTE — PROGRESS NOTES
[facility] Medicine Services  INPATIENT PROGRESS NOTE    Patient Name: Emiliano Recinos  Date of Admission: 12/15/2021  Today's Date: 12/16/21  Length of Stay: 0  Primary Care Physician: Florentin Costello MD    Subjective   Chief Complaint: Bilateral lower extremity edema  HPI   She was sitting up in bed finishing lunch.  Denies shortness of breath, chest pain or pressure.  States she feels well overall.  She has been urinating well.  She feels as though bilateral lower extremity edema has improved today.  Physical therapy has applied Unna boots to bilateral lower extremities.    Review of Systems   All pertinent negatives and positives are as above. All other systems have been reviewed and are negative unless otherwise stated.     Objective    Temp:  [97.8 °F (36.6 °C)-98.4 °F (36.9 °C)] 97.8 °F (36.6 °C)  Heart Rate:  [55-72] 64  Resp:  [16-18] 18  BP: (113-176)/(53-70) 126/53  Physical Exam  Constitutional:       Appearance: She is well-developed.      Comments: Up in bed.  No acute distress.  Tolerating room air.  No family bedside.  Discussed with her nurse, Peggy.   HENT:      Head: Normocephalic and atraumatic.      Right Ear: Decreased hearing noted.      Left Ear: Decreased hearing noted.   Eyes:      General: No scleral icterus.     Conjunctiva/sclera: Conjunctivae normal.      Pupils: Pupils are equal, round, and reactive to light.   Neck:      Vascular: No JVD.   Cardiovascular:      Rate and Rhythm: Normal rate and regular rhythm.      Heart sounds: Normal heart sounds. No murmur heard.      Pulmonary:      Effort: Pulmonary effort is normal.      Breath sounds: Normal breath sounds. No wheezing or rales.   Abdominal:      General: Bowel sounds are normal. There is no distension.      Palpations: Abdomen is soft.      Tenderness: There is no abdominal tenderness. There is no guarding.   Musculoskeletal:         General: Normal range of motion.      Cervical back:  Neck supple.      Right lower leg: Edema present.      Left lower leg: Edema present.   Lymphadenopathy:      Cervical: No cervical adenopathy.   Skin:     General: Skin is warm and dry.   Neurological:      Mental Status: She is alert and oriented to person, place, and time.      Cranial Nerves: No cranial nerve deficit.   Psychiatric:         Behavior: Behavior normal.       Results Review:  I have reviewed the labs, radiology results, and diagnostic studies.    Laboratory Data:   Results from last 7 days   Lab Units 12/16/21  0618 12/15/21  1439   WBC 10*3/mm3 6.28 7.51   HEMOGLOBIN g/dL 11.0* 10.8*   HEMATOCRIT % 35.4 35.1   PLATELETS 10*3/mm3 280 303        Results from last 7 days   Lab Units 12/16/21  0617 12/15/21  1439   SODIUM mmol/L 141 137   POTASSIUM mmol/L 4.2 4.4   CHLORIDE mmol/L 102 102   CO2 mmol/L 30.0* 28.0   BUN mg/dL 27* 27*   CREATININE mg/dL 0.66 0.51*   CALCIUM mg/dL 9.3 9.1   BILIRUBIN mg/dL  --  0.3   ALK PHOS U/L  --  71   ALT (SGPT) U/L  --  15   AST (SGOT) U/L  --  16   GLUCOSE mg/dL 148* 124*     Radiology Data:   Imaging Results (Last 24 Hours)     Procedure Component Value Units Date/Time    US Venous Doppler Lower Extremity Bilateral (duplex) [[id number]] Collected: 12/15/21 1941     Updated: 12/15/21 1944    Narrative:      History: Swelling       Impression:      Impression: There is no evidence of deep venous thrombosis or  superficial thrombophlebitis of right or left lower extremities.     Comments: Bilateral lower extremity venous duplex exam was performed  using color Doppler flow, Doppler waveform analysis, and grayscale  imaging, with and without compression. There is no evidence of deep  venous thrombosis in the common femoral, superficial femoral, popliteal,  peroneal, anterior tibial, and posterior tibial veins bilaterally. No  thrombus is identified in the saphenofemoral junctions and greater  saphenous veins bilaterally.         This report was finalized on 12/15/2021  19:41 by Dr. Edwar Woods MD.    XR Chest 1 View [[id number]] Collected: 12/15/21 1635     Updated: 12/15/21 1641    Narrative:      Frontal upright radiograph of the chest 12/15/2021 4:29 PM CST     HISTORY: Fluid overload, pedal edema     COMPARISON: Chest exam dated 11/19/2021.     FINDINGS:      1.5 cm nodular opacity in the left lung base, just lateral to the left  heart border. Lungs are otherwise clear. No lung consolidation. No  pleural effusion or pneumothorax. Heart size is stable. Pulmonary  vasculature are nondilated. Left chest wall dual-chamber pacemaker. The  osseous structures and surrounding soft tissues demonstrate no acute  abnormality.       Impression:      1. No evidence of acute infiltrate or pulmonary edema.   2. There is a suspicious 1.5 cm nodular opacity in the left lung base.  This is not resolved from the earlier comparison exam. A small primary  lung malignancy is not excluded.        This report was finalized on 12/15/2021 16:38 by Dr Alex Hong, .          I have reviewed the patient's current medications.     Assessment/Plan     Active Hospital Problems    Diagnosis    • Bilateral lower extremity edema    • Lung nodule    • Acute on chronic diastolic heart failure (HCC)    • CAD (coronary artery disease)    • GERD (gastroesophageal reflux disease)    • HLD (hyperlipidemia)      Plan:  1.  Patient presented on 12/15 as directed by Dr. Abdullahi due to excessive pedal edema.  She had recently been taken off of her diuretic by Dr. Costello.  Unsure why diuretic was discontinued.  .  Chest x-ray did not show pulmonary edema or acute infiltrate.  Venous Doppler bilateral lower extremities negative for thrombosis.    2.  Continue IV Lasix 20 mg twice daily.  Will likely transition to oral tomorrow.  Intake and output.  Daily weights.    3.  Transthoracic echocardiogram.  Discussed with ANNI Woods.    4.  A1c 7.30.  Continue Accu-Cheks and sliding scale insulin.    5.  Continue  physical and Occupational Therapy.  PT placed bilateral lower extremity Unna boots today.    6.  Lovenox for VTE prophylaxis.    Discharge Planning: I expect the patient to be discharged to home with assist and home health likely tomorrow.    Electronically signed by TESSA Cross, 12/16/21, 13:37 CST.

## 2021-12-16 NOTE — PLAN OF CARE
Goal Outcome Evaluation:  Plan of Care Reviewed With: patient        Progress: no change  Outcome Summary: Initial nutrition assessment. Pt identified at nutrition risk r/t non-healing wounds and edema to bilateral lower extremities. She is ordered a cardiac diet. She says her appetite has been better than it has been in a long time. She has been enjoying the hospital food. She says when her appetite is poor at home, she supplements with strawberry or vanila ensure. Since her appetite is good right now, she would prefer to hold off on supplements and get her nutrition from her food. She has consumed 75% of 1 meal. Advised her of alternative selections if needed. Will hold off on supplements at this time and follow to establish intake. If intake is suboptimal, will order Boost Plus as needed.

## 2021-12-17 ENCOUNTER — APPOINTMENT (OUTPATIENT)
Dept: CARDIOLOGY | Facility: HOSPITAL | Age: 86
End: 2021-12-17

## 2021-12-17 ENCOUNTER — HOME HEALTH ADMISSION (OUTPATIENT)
Dept: HOME HEALTH SERVICES | Facility: HOME HEALTHCARE | Age: 86
End: 2021-12-17

## 2021-12-17 VITALS
HEIGHT: 65 IN | RESPIRATION RATE: 16 BRPM | SYSTOLIC BLOOD PRESSURE: 132 MMHG | TEMPERATURE: 98.2 F | WEIGHT: 135 LBS | OXYGEN SATURATION: 99 % | DIASTOLIC BLOOD PRESSURE: 50 MMHG | BODY MASS INDEX: 22.49 KG/M2 | HEART RATE: 67 BPM

## 2021-12-17 LAB
ANION GAP SERPL CALCULATED.3IONS-SCNC: 10 MMOL/L (ref 5–15)
BH CV ECHO MEAS - AO MAX PG (FULL): 4.1 MMHG
BH CV ECHO MEAS - AO MAX PG: 7.3 MMHG
BH CV ECHO MEAS - AO MEAN PG (FULL): 2 MMHG
BH CV ECHO MEAS - AO MEAN PG: 3 MMHG
BH CV ECHO MEAS - AO ROOT AREA (BSA CORRECTED): 1.6
BH CV ECHO MEAS - AO ROOT AREA: 5.9 CM^2
BH CV ECHO MEAS - AO ROOT DIAM: 2.8 CM
BH CV ECHO MEAS - AO V2 MAX: 135 CM/SEC
BH CV ECHO MEAS - AO V2 MEAN: 75.8 CM/SEC
BH CV ECHO MEAS - AO V2 VTI: 28.7 CM
BH CV ECHO MEAS - AVA(I,A): 2.4 CM^2
BH CV ECHO MEAS - AVA(I,D): 2.4 CM^2
BH CV ECHO MEAS - AVA(V,A): 2.1 CM^2
BH CV ECHO MEAS - AVA(V,D): 2.1 CM^2
BH CV ECHO MEAS - BSA(HAYCOCK): 1.7 M^2
BH CV ECHO MEAS - BSA: 1.7 M^2
BH CV ECHO MEAS - BZI_BMI: 22.5 KILOGRAMS/M^2
BH CV ECHO MEAS - BZI_METRIC_HEIGHT: 165.1 CM
BH CV ECHO MEAS - BZI_METRIC_WEIGHT: 61.2 KG
BH CV ECHO MEAS - EDV(CUBED): 139.8 ML
BH CV ECHO MEAS - EDV(MOD-SP4): 83 ML
BH CV ECHO MEAS - EDV(TEICH): 128.9 ML
BH CV ECHO MEAS - EF(CUBED): 78.5 %
BH CV ECHO MEAS - EF(MOD-SP4): 68.7 %
BH CV ECHO MEAS - EF(TEICH): 70.4 %
BH CV ECHO MEAS - ESV(CUBED): 30.1 ML
BH CV ECHO MEAS - ESV(MOD-SP4): 26 ML
BH CV ECHO MEAS - ESV(TEICH): 38.2 ML
BH CV ECHO MEAS - FS: 40.1 %
BH CV ECHO MEAS - IVS/LVPW: 0.87
BH CV ECHO MEAS - IVSD: 0.93 CM
BH CV ECHO MEAS - LA DIMENSION: 3.8 CM
BH CV ECHO MEAS - LA/AO: 1.4
BH CV ECHO MEAS - LAT PEAK E' VEL: 3.4 CM/SEC
BH CV ECHO MEAS - LV DIASTOLIC VOL/BSA (35-75): 49.6 ML/M^2
BH CV ECHO MEAS - LV MASS(C)D: 193.5 GRAMS
BH CV ECHO MEAS - LV MASS(C)DI: 115.6 GRAMS/M^2
BH CV ECHO MEAS - LV MAX PG: 3.2 MMHG
BH CV ECHO MEAS - LV MEAN PG: 1 MMHG
BH CV ECHO MEAS - LV SYSTOLIC VOL/BSA (12-30): 15.5 ML/M^2
BH CV ECHO MEAS - LV V1 MAX: 89.7 CM/SEC
BH CV ECHO MEAS - LV V1 MEAN: 49.8 CM/SEC
BH CV ECHO MEAS - LV V1 VTI: 21.5 CM
BH CV ECHO MEAS - LVIDD: 5.2 CM
BH CV ECHO MEAS - LVIDS: 3.1 CM
BH CV ECHO MEAS - LVLD AP4: 7 CM
BH CV ECHO MEAS - LVLS AP4: 5.6 CM
BH CV ECHO MEAS - LVOT AREA (M): 3.1 CM^2
BH CV ECHO MEAS - LVOT AREA: 3.1 CM^2
BH CV ECHO MEAS - LVOT DIAM: 2 CM
BH CV ECHO MEAS - LVPWD: 1.1 CM
BH CV ECHO MEAS - MED PEAK E' VEL: 4.13 CM/SEC
BH CV ECHO MEAS - MV A MAX VEL: 127 CM/SEC
BH CV ECHO MEAS - MV DEC SLOPE: 261 CM/SEC^2
BH CV ECHO MEAS - MV DEC TIME: 0.32 SEC
BH CV ECHO MEAS - MV E MAX VEL: 73.7 CM/SEC
BH CV ECHO MEAS - MV E/A: 0.58
BH CV ECHO MEAS - MV P1/2T MAX VEL: 84.2 CM/SEC
BH CV ECHO MEAS - MV P1/2T: 94.5 MSEC
BH CV ECHO MEAS - MVA P1/2T LCG: 2.6 CM^2
BH CV ECHO MEAS - MVA(P1/2T): 2.3 CM^2
BH CV ECHO MEAS - RAP SYSTOLE: 10 MMHG
BH CV ECHO MEAS - RVDD: 2.5 CM
BH CV ECHO MEAS - RVSP: 31.5 MMHG
BH CV ECHO MEAS - SI(AO): 101.8 ML/M^2
BH CV ECHO MEAS - SI(CUBED): 65.5 ML/M^2
BH CV ECHO MEAS - SI(LVOT): 40.4 ML/M^2
BH CV ECHO MEAS - SI(MOD-SP4): 34.1 ML/M^2
BH CV ECHO MEAS - SI(TEICH): 54.2 ML/M^2
BH CV ECHO MEAS - SV(AO): 170.5 ML
BH CV ECHO MEAS - SV(CUBED): 109.7 ML
BH CV ECHO MEAS - SV(LVOT): 67.5 ML
BH CV ECHO MEAS - SV(MOD-SP4): 57 ML
BH CV ECHO MEAS - SV(TEICH): 90.7 ML
BH CV ECHO MEAS - TR MAX VEL: 232 CM/SEC
BH CV ECHO MEASUREMENTS AVERAGE E/E' RATIO: 19.58
BUN SERPL-MCNC: 30 MG/DL (ref 8–23)
BUN/CREAT SERPL: 46.2 (ref 7–25)
CALCIUM SPEC-SCNC: 9.6 MG/DL (ref 8.2–9.6)
CHLORIDE SERPL-SCNC: 100 MMOL/L (ref 98–107)
CO2 SERPL-SCNC: 32 MMOL/L (ref 22–29)
CREAT SERPL-MCNC: 0.65 MG/DL (ref 0.57–1)
GFR SERPL CREATININE-BSD FRML MDRD: 84 ML/MIN/1.73
GLUCOSE BLDC GLUCOMTR-MCNC: 173 MG/DL (ref 70–130)
GLUCOSE SERPL-MCNC: 176 MG/DL (ref 65–99)
LEFT ATRIUM VOLUME INDEX: 18.5 ML/M2
LEFT ATRIUM VOLUME: 33.2 CM3
MAXIMAL PREDICTED HEART RATE: 122 BPM
POTASSIUM SERPL-SCNC: 3.9 MMOL/L (ref 3.5–5.2)
SODIUM SERPL-SCNC: 142 MMOL/L (ref 136–145)
STRESS TARGET HR: 104 BPM

## 2021-12-17 PROCEDURE — 93306 TTE W/DOPPLER COMPLETE: CPT

## 2021-12-17 PROCEDURE — 63710000001 INSULIN LISPRO (HUMAN) PER 5 UNITS: Performed by: INTERNAL MEDICINE

## 2021-12-17 PROCEDURE — G0378 HOSPITAL OBSERVATION PER HR: HCPCS

## 2021-12-17 PROCEDURE — 99214 OFFICE O/P EST MOD 30 MIN: CPT | Performed by: NURSE PRACTITIONER

## 2021-12-17 PROCEDURE — 25010000002 PERFLUTREN 6.52 MG/ML SUSPENSION: Performed by: INTERNAL MEDICINE

## 2021-12-17 PROCEDURE — 93306 TTE W/DOPPLER COMPLETE: CPT | Performed by: INTERNAL MEDICINE

## 2021-12-17 PROCEDURE — 82962 GLUCOSE BLOOD TEST: CPT

## 2021-12-17 PROCEDURE — 96376 TX/PRO/DX INJ SAME DRUG ADON: CPT

## 2021-12-17 PROCEDURE — 25010000002 FUROSEMIDE PER 20 MG: Performed by: INTERNAL MEDICINE

## 2021-12-17 PROCEDURE — 80048 BASIC METABOLIC PNL TOTAL CA: CPT | Performed by: NURSE PRACTITIONER

## 2021-12-17 RX ORDER — BUMETANIDE 0.5 MG/1
1 TABLET ORAL 2 TIMES DAILY
Qty: 120 TABLET | Refills: 0 | Status: SHIPPED | OUTPATIENT
Start: 2021-12-17 | End: 2022-01-31

## 2021-12-17 RX ADMIN — PANTOPRAZOLE SODIUM 40 MG: 40 TABLET, DELAYED RELEASE ORAL at 05:40

## 2021-12-17 RX ADMIN — POLYETHYLENE GLYCOL 3350 17 G: 17 POWDER, FOR SOLUTION ORAL at 08:55

## 2021-12-17 RX ADMIN — CARVEDILOL 3.12 MG: 3.12 TABLET, FILM COATED ORAL at 09:03

## 2021-12-17 RX ADMIN — ASPIRIN 81 MG: 81 TABLET, COATED ORAL at 08:56

## 2021-12-17 RX ADMIN — SODIUM CHLORIDE, PRESERVATIVE FREE 10 ML: 5 INJECTION INTRAVENOUS at 09:21

## 2021-12-17 RX ADMIN — FUROSEMIDE 20 MG: 10 INJECTION, SOLUTION INTRAMUSCULAR; INTRAVENOUS at 08:55

## 2021-12-17 RX ADMIN — INSULIN LISPRO 2 UNITS: 100 INJECTION, SOLUTION INTRAVENOUS; SUBCUTANEOUS at 09:19

## 2021-12-17 RX ADMIN — PERFLUTREN 8.48 MG: 6.52 INJECTION, SUSPENSION INTRAVENOUS at 08:11

## 2021-12-17 RX ADMIN — FUROSEMIDE 20 MG: 10 INJECTION, SOLUTION INTRAMUSCULAR; INTRAVENOUS at 03:21

## 2021-12-17 NOTE — THERAPY DISCHARGE NOTE
Acute Care - Physical Therapy Discharge Summary  [facility]       Patient Name: Emiliano Recinos  : 1923  MRN: [id number]    Today's Date: 2021          Referring Physician: Dr. Saunders      Admit Date: 12/15/2021      PT Recommendation and Plan    Visit Dx:    ICD-10-CM ICD-9-CM   1. Bilateral lower extremity edema  R60.0 782.3   2. Impaired mobility  Z74.09 799.89                PT Rehab Goals     Row Name 21 1431             Bed Mobility Goal 1 (PT)    Activity/Assistive Device (Bed Mobility Goal 1, PT) sit to supine; supine to sit; rolling to right; rolling to left  -AB      [place] Level/Cues Needed (Bed Mobility Goal 1, PT) modified independence  -AB      Time Frame (Bed Mobility Goal 1, PT) long term goal (LTG)  -AB      Progress/Outcomes (Bed Mobility Goal 1, PT) goal not met  -AB              Transfer Goal 1 (PT)    Activity/Assistive Device (Transfer Goal 1, PT) sit-to-stand/stand-to-sit; bed-to-chair/chair-to-bed; walker, rolling  -AB      [place] Level/Cues Needed (Transfer Goal 1, PT) standby assist  -AB      Time Frame (Transfer Goal 1, PT) long term goal (LTG)  -AB      Progress/Outcome (Transfer Goal 1, PT) goal not met  -AB              Gait Training Goal 1 (PT)    Activity/Assistive Device (Gait Training Goal 1, PT) gait (walking locomotion); increase endurance/gait distance; diminish gait deviation; decrease fall risk; improve balance and speed; assistive device use; increase energy conservation; walker, rolling  -AB      [place]on Level (Gait Training Goal 1, PT) standby assist  -AB      Distance (Gait Training Goal 1, PT) 100  -AB      Time Frame (Gait Training Goal 1, PT) long term goal (LTG)  -AB      Progress/Outcome (Gait Training Goal 1, PT) goal not met  -AB              Wound Care Goal 1 (PT)    Wound Care Goal 1 (PT) Pt will tolerate unna boots to BLE to improve skin integrity and manage LE edema  -AB      Time Frame (Wound Care Goal 1, PT) long term goal  (LTG)  -AB      Progress/Outcome (Wound Care Goal 1, PT) goal partially met  -AB            User Key  (r) = Recorded By, (t) = Taken By, (c) = Cosigned By    Initials Name Provider Type Discipline    Aliza Askew, PTA Physical Therapy Assistant PT                    PT Discharge Summary  Anticipated Discharge Disposition (PT): home with assist, home with home health  Reason for Discharge: Discharge from facility  Outcomes Achieved: Refer to plan of care for updates on goals achieved  Discharge Destination: Home with assist      Aliza Coelho, LENA   12/17/2021

## 2021-12-17 NOTE — PROGRESS NOTES
Continued Stay Note   [place]     Patient Name: Emiliano Recinos  MRN: [id number]  Today's Date: 12/17/2021    Admit Date: 12/15/2021     Discharge Plan     Row Name 12/17/21 1011       Plan    Plan Home Health    Final Discharge Disposition Code 06 - home with home health care    Final Note Patient is discharged home with orders for home health. RITA spoke to patient and her daughter re home health. Both patient and daughter want [facility], operated by [facility]. RITA made referral to Raven with [facility]. No other discharge planning needs / orders.                Expected Discharge Date and Time     Expected Discharge Date Expected Discharge Time    Dec 17, 2021             ZINA Gonzales

## 2021-12-17 NOTE — NURSING NOTE
Spoke with patient regarding order for home health services  Patient  is agreeable to services.  Confirmed that address and phone number listed is correct. Number left with patient to call if any needs or concerns arise.

## 2021-12-17 NOTE — PROGRESS NOTES
Continued Stay Note  [facility]     Patient Name: Emiliano Recinos  MRN: [id number]  Today's Date: 12/17/2021    Admit Date: 12/15/2021     Discharge Plan     Row Name 12/17/21 1051       Plan    Plan [facility]    Final Discharge Disposition Code 06 - home with home health care    Final Note RITA spoke to Raven with [facility]. She will work on referral and speak to patient/daughter before they leave.    Row Name 12/17/21 1011       Plan    Plan Home Health    Final Discharge Disposition Code 06 - home with home health care    Final Note Patient is discharged home with orders for home health. SW spoke to patient and her daughter re home health. Both patient and daughter want [facility]. SW made referral to Raven with [facility]. No other discharge planning needs / orders.                Expected Discharge Date and Time     Expected Discharge Date Expected Discharge Time    Dec 17, 2021             ZINA Gonzales

## 2021-12-17 NOTE — CONSULTS
[facility]  INPATIENT WOUND CONSULTATION    Today's Date: 12/16/21    Patient Name: Emiliano Recinos  MRN: [id number]  CSN: [id number]  PCP: Florentin Costello MD  Referring Provider: Darion Saunders MD   Attending Provider: Darion Saunders MD  Length of Stay: 0    SUBJECTIVE   Chief Complaint: Lower extremity wounds    HPI: Emiliano Recinos, a 89 y.o.female, presents with a past medical history of myocardial infarction, type 2 diabetes mellitus, and coronary artery disease.  A full past medical history as listed below.  Patient presented to the emergency department due to lower extremity edema.  Patient saw Dr. Abdullahi, cardiologist, for follow-up and due to significant edema he advised her to go to the emergency department for evaluation and treatment.  Patient was seen in the emergency department approximately 1 month ago to worsening lower extremity edema which was treated with diuretics.  Primary care provider discontinued oral diuretics upon follow-up.    Inpatient wound care is consulted due to wounds of bilateral lower extremities.  Patient is found to have a small friction injury of the right lateral ankle ankle that is covered with scab.  Patient and Dr. Saunders reports that patient was vigorously rubbing ankle on bed and has been doing so which is caused this injury.  Patient has pitting edema present of bilateral lower extremities with slight erythema.  Right appears to be more edematous than the left lower extremity.  Patient had echocardiogram in November 2020 which resulted with EF of 61 to 65%.  Lower legs are warm with intact capillary refill.    Visit Dx:    ICD-10-CM ICD-9-CM   1. Bilateral lower extremity edema  R60.0 782.3   2. Impaired mobility  Z74.09 799.89     Patient Active Problem List   Diagnosis   • CAD (coronary artery disease)   • SSS (sick sinus syndrome) ([facility])   • GERD (gastroesophageal reflux disease)   • HLD (hyperlipidemia)   • CAD in native artery   • Stented  coronary artery   • Pedal edema   • NSVT (nonsustained ventricular tachycardia) ([facility])   • Bilateral lower extremity edema   • Lung nodule   • Acute on chronic diastolic heart failure ([facility])       History:   Past Medical History:   Diagnosis Date   • Bradycardia    • CAD in native artery    • Cardiac device in situ 03/29/2011    RCA stent   • Chest pain    • Diabetes mellitus type 2, uncomplicated ([facility])    • GERD (gastroesophageal reflux disease)    • Hard of hearing    • History of myocardial infarction 03/29/2011    EF 45% 2/11 cath   • Hyperlipemia, mixed    • Hypertension, benign    • Palpitation    • SSS (sick sinus syndrome) ([facility])    • Status cardiac pacemaker    • Syncope      Past Surgical History:   Procedure Laterality Date   • BLADDER SUSPENSION      with hysterectomy   • CARDIAC CATHETERIZATION     • CORONARY STENT PLACEMENT      x 1   • HYSTERECTOMY      total   • INSERT / REPLACE / REMOVE PACEMAKER     • PACEMAKER IMPLANTATION       Social History     Socioeconomic History   • Marital status:    Tobacco Use   • Smoking status: Never Smoker   • Smokeless tobacco: Never Used   Vaping Use   • Vaping Use: Never used   Substance and Sexual Activity   • Alcohol use: No   • Drug use: No   • Sexual activity: Defer     Family History   Problem Relation Age of Onset   • Coronary artery disease Mother    • Heart attack Mother        Allergies:  Allergies   Allergen Reactions   • Metoclopramide Swelling     Legs swelled       Medications:    Current Facility-Administered Medications:   •  acetaminophen (TYLENOL) tablet 650 mg, 650 mg, Oral, Q4H PRN **OR** acetaminophen (TYLENOL) 160 MG/5ML solution 650 mg, 650 mg, Oral, Q4H PRN **OR** acetaminophen (TYLENOL) suppository 650 mg, 650 mg, Rectal, Q4H PRN, Darion Saunders MD  •  aspirin EC tablet 81 mg, 81 mg, Oral, Daily, Darion Saunders MD, 81 mg at 12/16/21 0815  •  carvedilol (COREG) tablet 3.125 mg, 3.125 mg, Oral, BID, Darion Saunders MD, 3.125  mg at 12/16/21 2057  •  clopidogrel (PLAVIX) tablet 75 mg, 75 mg, Oral, Daily, Darion Saunders MD, 75 mg at 12/16/21 1727  •  dextrose (D50W) (25 g/50 mL) IV injection 25 g, 25 g, Intravenous, Q15 Min PRN, Darion Saunders MD  •  dextrose (GLUTOSE) oral gel 15 g, 15 g, Oral, Q15 Min PRN, Darion Saunders MD  •  enoxaparin (LOVENOX) syringe 40 mg, 40 mg, Subcutaneous, Q24H, Darion Saunders MD, 40 mg at 12/16/21 2056  •  furosemide (LASIX) injection 20 mg, 20 mg, Intravenous, Q8H, Darion Saunders MD, 20 mg at 12/17/21 0321  •  glucagon (human recombinant) (GLUCAGEN DIAGNOSTIC) injection 1 mg, 1 mg, Subcutaneous, Q15 Min PRN, Darion Saunders MD  •  insulin lispro (humaLOG) injection 2-7 Units, 2-7 Units, Subcutaneous, TID AC, Darion Saunders MD, 2 Units at 12/16/21 1727  •  nitroglycerin (NITROSTAT) SL tablet 0.4 mg, 0.4 mg, Sublingual, Q5 Min PRN, Darion Saunders MD  •  ondansetron (ZOFRAN) tablet 4 mg, 4 mg, Oral, Q6H PRN **OR** ondansetron (ZOFRAN) injection 4 mg, 4 mg, Intravenous, Q6H PRN, Darion Saunders MD  •  pantoprazole (PROTONIX) EC tablet 40 mg, 40 mg, Oral, Q AM, Darion Saunders MD, 40 mg at 12/17/21 0540  •  polyethylene glycol (MIRALAX) packet 17 g, 17 g, Oral, Daily, Darion Saunders MD, 17 g at 12/16/21 0815  •  rosuvastatin (CRESTOR) tablet 10 mg, 10 mg, Oral, Nightly, Darion Saunders MD, 10 mg at 12/16/21 2057  •  [COMPLETED] Insert peripheral IV, , , Once **AND** sodium chloride 0.9 % flush 10 mL, 10 mL, Intravenous, PRN, Darion Saunders MD  •  sodium chloride 0.9 % flush 10 mL, 10 mL, Intravenous, Q12H, Darion Saunders MD, 10 mL at 12/16/21 2059  •  sodium chloride 0.9 % flush 10 mL, 10 mL, Intravenous, PRN, Darion Saunders MD    Review of Systems:  Review of Systems   Constitutional: Positive for fatigue. Negative for chills and fever.   HENT: Negative for rhinorrhea and sore throat.    Respiratory: Negative for cough and shortness of  breath.    Cardiovascular: Positive for leg swelling. Negative for chest pain and palpitations.   Gastrointestinal: Negative for diarrhea, nausea and vomiting.   Genitourinary: Negative for frequency and urgency.   Musculoskeletal: Positive for gait problem and myalgias.   Skin: Positive for color change and wound.   Neurological: Positive for weakness. Negative for dizziness.   Psychiatric/Behavioral: Negative for agitation, behavioral problems and confusion.         OBJECTIVE     Temp:  [97.8 °F (36.6 °C)-98.4 °F (36.9 °C)] 97.8 °F (36.6 °C)  Heart Rate:  [55-72] 64  Resp:  [16-18] 18  BP: (113-176)/(53-70) 126/53    PHYSICAL EXAM  Physical Exam  Vitals and nursing note reviewed.   Constitutional:       General: She is awake.   HENT:      Head: Normocephalic and atraumatic.      Right Ear: Decreased hearing noted.      Left Ear: Decreased hearing noted.   Eyes:      General: Lids are normal. Gaze aligned appropriately.   Cardiovascular:      Rate and Rhythm: Normal rate and regular rhythm.   Pulmonary:      Effort: Pulmonary effort is normal. No respiratory distress.   Abdominal:      General: There is no distension.      Palpations: Abdomen is soft.   Musculoskeletal:      Cervical back: Normal range of motion and neck supple.   Feet:      Right foot:      Skin integrity: Ulcer present.      Left foot:      Skin integrity: Skin breakdown present.      Comments: Friction/shear injuries to bilateral feet with ulceration developed on right lateral ankle and slight skin breakdown present on left medial heel.  Wound of right lateral ankle measures approximately 0.3 x 0.3 cm and is covered with dry scab.  No erythema or drainage from this area no signs of infection.  Skin breakdown on left medial heel has epidermal skin loss with slight discoloration appearing to be a shearing injury.  No drainage or erythema extending from this wound to show signs of infection.  Skin:     General: Skin is warm and dry.      Findings:  Bruising, erythema and wound present.   Neurological:      Mental Status: She is alert and oriented to person, place, and time.      Motor: Weakness present.   Psychiatric:         Attention and Perception: Attention normal.         Mood and Affect: Mood normal.         Behavior: Behavior is cooperative.                                    Results Review:  Results from last 7 days   Lab Units 12/16/21  0618 12/15/21  1439   WBC 10*3/mm3 6.28 7.51   HEMOGLOBIN g/dL 11.0* 10.8*   HEMATOCRIT % 35.4 35.1   PLATELETS 10*3/mm3 280 303               Results from last 7 days   Lab Units 12/16/21  0617 12/15/21  1439   SODIUM mmol/L 141 137   POTASSIUM mmol/L 4.2 4.4   CHLORIDE mmol/L 102 102   CO2 mmol/L 30.0* 28.0   BUN mg/dL 27* 27*   CREATININE mg/dL 0.66 0.51*   CALCIUM mg/dL 9.3 9.1   BILIRUBIN mg/dL  --  0.3   ALK PHOS U/L  --  71   ALT (SGPT) U/L  --  15   AST (SGOT) U/L  --  16   GLUCOSE mg/dL 148* 124*          Imaging Results (Last 72 Hours)     Procedure Component Value Units Date/Time    US Venous Doppler Lower Extremity Bilateral (duplex) [[id number]] Collected: 12/15/21 1941     Updated: 12/15/21 1944    Narrative:      History: Swelling       Impression:      Impression: There is no evidence of deep venous thrombosis or  superficial thrombophlebitis of right or left lower extremities.     Comments: Bilateral lower extremity venous duplex exam was performed  using color Doppler flow, Doppler waveform analysis, and grayscale  imaging, with and without compression. There is no evidence of deep  venous thrombosis in the common femoral, superficial femoral, popliteal,  peroneal, anterior tibial, and posterior tibial veins bilaterally. No  thrombus is identified in the saphenofemoral junctions and greater  saphenous veins bilaterally.         This report was finalized on 12/15/2021 19:41 by Dr. Edwar Woods MD.    XR Chest 1 View [[id number]] Collected: 12/15/21 1635     Updated: 12/15/21 1641    Narrative:       Frontal upright radiograph of the chest 12/15/2021 4:29 PM CST     HISTORY: Fluid overload, pedal edema     COMPARISON: Chest exam dated 11/19/2021.     FINDINGS:      1.5 cm nodular opacity in the left lung base, just lateral to the left  heart border. Lungs are otherwise clear. No lung consolidation. No  pleural effusion or pneumothorax. Heart size is stable. Pulmonary  vasculature are nondilated. Left chest wall dual-chamber pacemaker. The  osseous structures and surrounding soft tissues demonstrate no acute  abnormality.       Impression:      1. No evidence of acute infiltrate or pulmonary edema.   2. There is a suspicious 1.5 cm nodular opacity in the left lung base.  This is not resolved from the earlier comparison exam. A small primary  lung malignancy is not excluded.        This report was finalized on 12/15/2021 16:38 by Dr Alex Hong, .             ASSESSMENT/PLAN       Examination and evaluation of wound(s) was performed.    DIAGNOSIS:   Bilateral lower extremity edema  Impaired mobility  Friction injury of right lateral ankle  Coronary artery disease  Acute on chronic diastolic heart failure      PLAN:   Continue care plan with unna boot application as Dr. Saunders ordered.     Discussed findings and treatment plan including risks, benefits, and treatment options with patient in detail. Patient agreed with treatment plan.      This document has been electronically signed by TESSA Syed on 12/16/2021 at 1030      Time spent in face-to-face evaluation, chart review, planning and education patient minutes with greater than 50% of time spent with patient and in coordination of care

## 2021-12-17 NOTE — PLAN OF CARE
Goal Outcome Evaluation:  Plan of Care Reviewed With: patient        Progress: improving  Outcome Summary: Pt has not c/o pain this shift. IV lasix given. Voiding large amounts in the BSC. Tressa boots to BLE. Resting comfortably. VSS. Safety maintained.

## 2021-12-17 NOTE — PROGRESS NOTES
[facility]  INPATIENT WOUND CARE    PROGRESS NOTE    Today's Date: 12/17/21    Patient Name: Emiliano Recinos  MRN: [id number]  [facility]: [id number]  PCP: Florentin Costello MD  Referring Provider: Darion Saunders MD   Attending Provider: Darion Saunders MD  Length of Stay: 0    SUBJECTIVE   Chief Complaint: Lower extremity wounds and edema    HPI: Emiliano Recinso continues care in room 392.  Unna boots remain in place.  Plans for discharge today.  Patient states legs feel much better with compression in place.  Patient will be going home with home health services ordered.  Discussed compression and the need to continue even after edema has dissipated.  Daughter is at the bedside.    Visit Dx:    ICD-10-CM ICD-9-CM   1. Bilateral lower extremity edema  R60.0 782.3   2. Impaired mobility  Z74.09 799.89     Patient Active Problem List   Diagnosis   • CAD (coronary artery disease)   • SSS (sick sinus syndrome) ([facility])   • GERD (gastroesophageal reflux disease)   • HLD (hyperlipidemia)   • CAD in native artery   • Stented coronary artery   • Pedal edema   • NSVT (nonsustained ventricular tachycardia) ([facility])   • Bilateral lower extremity edema   • Lung nodule   • Acute on chronic diastolic heart failure ([facility])       History:   Past Medical History:   Diagnosis Date   • Bradycardia    • CAD in native artery    • Cardiac device in situ 03/29/2011    RCA stent   • Chest pain    • Diabetes mellitus type 2, uncomplicated ([facility])    • GERD (gastroesophageal reflux disease)    • Hard of hearing    • History of myocardial infarction 03/29/2011    EF 45% 2/11 cath   • Hyperlipemia, mixed    • Hypertension, benign    • Palpitation    • SSS (sick sinus syndrome) ([facility])    • Status cardiac pacemaker    • Syncope      Past Surgical History:   Procedure Laterality Date   • BLADDER SUSPENSION      with hysterectomy   • CARDIAC CATHETERIZATION     • CORONARY STENT PLACEMENT      x 1   • HYSTERECTOMY      total   • INSERT /  REPLACE / REMOVE PACEMAKER     • PACEMAKER IMPLANTATION       Social History     Socioeconomic History   • Marital status:    Tobacco Use   • Smoking status: Never Smoker   • Smokeless tobacco: Never Used   Vaping Use   • Vaping Use: Never used   Substance and Sexual Activity   • Alcohol use: No   • Drug use: No   • Sexual activity: Defer       Allergies:  Allergies   Allergen Reactions   • Metoclopramide Swelling     Legs swelled       Medications:    Current Facility-Administered Medications:   •  acetaminophen (TYLENOL) tablet 650 mg, 650 mg, Oral, Q4H PRN **OR** acetaminophen (TYLENOL) 160 MG/5ML solution 650 mg, 650 mg, Oral, Q4H PRN **OR** acetaminophen (TYLENOL) suppository 650 mg, 650 mg, Rectal, Q4H PRN, Darion Saunders MD  •  aspirin EC tablet 81 mg, 81 mg, Oral, Daily, Darion Saunders MD, 81 mg at 12/17/21 0856  •  carvedilol (COREG) tablet 3.125 mg, 3.125 mg, Oral, BID, Darion Saunders MD, 3.125 mg at 12/17/21 0903  •  clopidogrel (PLAVIX) tablet 75 mg, 75 mg, Oral, Daily, Darion Saunders MD, 75 mg at 12/16/21 1727  •  dextrose (D50W) (25 g/50 mL) IV injection 25 g, 25 g, Intravenous, Q15 Min PRN, Darion Saunders MD  •  dextrose (GLUTOSE) oral gel 15 g, 15 g, Oral, Q15 Min PRN, Darion Saunders MD  •  enoxaparin (LOVENOX) syringe 40 mg, 40 mg, Subcutaneous, Q24H, Darion Saunders MD, 40 mg at 12/16/21 2056  •  furosemide (LASIX) injection 20 mg, 20 mg, Intravenous, Q8H, Darion Saunders MD, 20 mg at 12/17/21 0855  •  glucagon (human recombinant) (GLUCAGEN DIAGNOSTIC) injection 1 mg, 1 mg, Subcutaneous, Q15 Min PRN, Darion Saunders MD  •  insulin lispro (humaLOG) injection 2-7 Units, 2-7 Units, Subcutaneous, TID AC, Darion Saunders MD, 2 Units at 12/17/21 0919  •  nitroglycerin (NITROSTAT) SL tablet 0.4 mg, 0.4 mg, Sublingual, Q5 Min PRN, Darion Saunders MD  •  ondansetron (ZOFRAN) tablet 4 mg, 4 mg, Oral, Q6H PRN **OR** ondansetron (ZOFRAN) injection 4 mg,  4 mg, Intravenous, Q6H PRN, Darion Saunders MD  •  pantoprazole (PROTONIX) EC tablet 40 mg, 40 mg, Oral, Q AM, Darion Saunders MD, 40 mg at 12/17/21 0540  •  polyethylene glycol (MIRALAX) packet 17 g, 17 g, Oral, Daily, Darion Saunders MD, 17 g at 12/17/21 0855  •  rosuvastatin (CRESTOR) tablet 10 mg, 10 mg, Oral, Nightly, Darion Saunders MD, 10 mg at 12/16/21 2057  •  [COMPLETED] Insert peripheral IV, , , Once **AND** sodium chloride 0.9 % flush 10 mL, 10 mL, Intravenous, PRN, Darion Saunders MD  •  sodium chloride 0.9 % flush 10 mL, 10 mL, Intravenous, Q12H, Darion Saunders MD, 10 mL at 12/17/21 0921  •  sodium chloride 0.9 % flush 10 mL, 10 mL, Intravenous, PRN, Darion Saunders MD    Review of Systems:  Review of Systems   Constitutional: Negative for chills and fever.   HENT: Negative for rhinorrhea and sore throat.    Respiratory: Negative for cough and shortness of breath.    Cardiovascular: Positive for leg swelling. Negative for chest pain and palpitations.   Gastrointestinal: Negative for diarrhea, nausea and vomiting.   Genitourinary: Negative for frequency and urgency.   Musculoskeletal: Positive for gait problem and myalgias.   Skin: Positive for color change and wound.   Neurological: Positive for weakness. Negative for dizziness.   Psychiatric/Behavioral: Negative for agitation, behavioral problems and confusion.       OBJECTIVE     Vitals:    12/17/21 0859   BP: 132/50   Pulse: 67   Resp: 16   Temp: 98.2 °F (36.8 °C)   SpO2: 99%       PHYSICAL EXAM  Physical Exam  Vitals and nursing note reviewed.   Constitutional:       General: She is awake.   HENT:      Head: Normocephalic and atraumatic.      Right Ear: Decreased hearing noted.      Left Ear: Decreased hearing noted.   Eyes:      General: Lids are normal. Gaze aligned appropriately.   Cardiovascular:      Rate and Rhythm: Normal rate and regular rhythm.   Pulmonary:      Effort: Pulmonary effort is normal. No  respiratory distress.   Abdominal:      General: There is no distension.      Palpations: Abdomen is soft.   Musculoskeletal:      Cervical back: Normal range of motion and neck supple.   Feet:      Right foot:      Skin integrity: Ulcer present.      Left foot:      Skin integrity: Skin breakdown present.   Skin:     General: Skin is warm and dry.      Findings: Bruising, erythema and wound present.       Comments: Unna boots currently in place.  Capillary refill intact.  Toes are warm.  No pain reported.  Edema lateral lower extremities is decreased today.  Neurological:      Mental Status: She is alert and oriented to person, place, and time.      Motor: Weakness present.   Psychiatric:         Attention and Perception: Attention normal.         Mood and Affect: Mood normal.         Behavior: Behavior is cooperative.          Results Review:  Lab Results (last 48 hours)     Procedure Component Value Units Date/Time    POC Glucose Once [[id number]]  (Abnormal) Collected: 12/17/21 0906    Specimen: Blood Updated: 12/17/21 0924     Glucose 173 mg/dL      Comment: : [id number] Nicole AgueroleMeter ID: [id number]       Basic Metabolic Panel [[id number]]  (Abnormal) Collected: 12/17/21 0615    Specimen: Blood Updated: 12/17/21 0703     Glucose 176 mg/dL      BUN 30 mg/dL      Creatinine 0.65 mg/dL      Sodium 142 mmol/L      Potassium 3.9 mmol/L      Chloride 100 mmol/L      CO2 32.0 mmol/L      Calcium 9.6 mg/dL      eGFR Non African Amer 84 mL/min/1.73      BUN/Creatinine Ratio 46.2     Anion Gap 10.0 mmol/L     Narrative:      GFR Normal >60  Chronic Kidney Disease <60  Kidney Failure <15      POC Glucose Once [[id number]]  (Abnormal) Collected: 12/16/21 1642    Specimen: Blood Updated: 12/16/21 1702     Glucose 151 mg/dL      Comment: : [id number] Rolf PayneenMeter ID: [id number]       POC Glucose Once [[id number]]  (Abnormal) Collected: 12/16/21 1116    Specimen: Blood Updated: 12/16/21 1128     Glucose 220 mg/dL       Comment: : [id number] Rolf WoodsMeter ID: [id number]       POC Glucose Once [[id number]]  (Abnormal) Collected: 12/16/21 0722    Specimen: Blood Updated: 12/16/21 0733     Glucose 141 mg/dL      Comment: : [id number] Rolf WoodsMeter ID: [id number]       Hemoglobin A1c [[id number]]  (Abnormal) Collected: 12/16/21 0617    Specimen: Blood Updated: 12/16/21 0711     Hemoglobin A1C 7.30 %     Narrative:      Hemoglobin A1C Ranges:    Increased Risk for Diabetes  5.7% to 6.4%  Diabetes                     >= 6.5%  Diabetic Goal                < 7.0%    Basic Metabolic Panel [[id number]]  (Abnormal) Collected: 12/16/21 0617    Specimen: Blood Updated: 12/16/21 0705     Glucose 148 mg/dL      BUN 27 mg/dL      Creatinine 0.66 mg/dL      Sodium 141 mmol/L      Potassium 4.2 mmol/L      Chloride 102 mmol/L      CO2 30.0 mmol/L      Calcium 9.3 mg/dL      eGFR Non African Amer 83 mL/min/1.73      BUN/Creatinine Ratio 40.9     Anion Gap 9.0 mmol/L     Narrative:      GFR Normal >60  Chronic Kidney Disease <60  Kidney Failure <15      Phosphorus [[id number]]  (Normal) Collected: 12/16/21 0617    Specimen: Blood Updated: 12/16/21 0705     Phosphorus 4.2 mg/dL     Magnesium [[id number]]  (Normal) Collected: 12/16/21 0617    Specimen: Blood Updated: 12/16/21 0705     Magnesium 2.1 mg/dL     TSH [[id number]]  (Normal) Collected: 12/16/21 0617    Specimen: Blood Updated: 12/16/21 0705     TSH 1.350 uIU/mL     CBC Auto Differential [[id number]]  (Abnormal) Collected: 12/16/21 0618    Specimen: Blood Updated: 12/16/21 0640     WBC 6.28 10*3/mm3      RBC 3.80 10*6/mm3      Hemoglobin 11.0 g/dL      Hematocrit 35.4 %      MCV 93.2 fL      MCH 28.9 pg      MCHC 31.1 g/dL      RDW 14.3 %      RDW-SD 48.5 fl      MPV 8.9 fL      Platelets 280 10*3/mm3      Neutrophil % 62.2 %      Lymphocyte % 27.1 %      Monocyte % 7.0 %      Eosinophil % 2.1 %      Basophil % 0.6 %      Immature Grans % 1.0 %      Neutrophils, Absolute 3.91  10*3/mm3      Lymphocytes, Absolute 1.70 10*3/mm3      Monocytes, Absolute 0.44 10*3/mm3      Eosinophils, Absolute 0.13 10*3/mm3      Basophils, Absolute 0.04 10*3/mm3      Immature Grans, Absolute 0.06 10*3/mm3      nRBC 0.0 /100 WBC     POC Glucose Once [[id number]]  (Normal) Collected: 12/15/21 1835    Specimen: Blood Updated: 12/15/21 1857     Glucose 111 mg/dL      Comment: : [id number] Justice Diamond ID: [id number]       COVID PRE-OP / PRE-PROCEDURE SCREENING ORDER (NO ISOLATION) - Swab, Nasal Cavity [[id number]]  (Normal) Collected: 12/15/21 1532    Specimen: Swab from Nasal Cavity Updated: 12/15/21 1626    Narrative:      The following orders were created for panel order COVID PRE-OP / PRE-PROCEDURE SCREENING ORDER (NO ISOLATION) - Swab, Nasal Cavity.  Procedure                               Abnormality         Status                     ---------                               -----------         ------                     COVID-19,[facility] [facility]...[[id number]]  Normal              Final result                 Please view results for these tests on the individual orders.    COVID-19,[facility] [employer] IN-HOUSE,Nasal Swab No Transport Media 3-4 HR TAT - Swab, Nasal Cavity [[id number]]  (Normal) Collected: 12/15/21 1532    Specimen: Swab from Nasal Cavity Updated: 12/15/21 1626     COVID19 Not Detected    Narrative:      Fact sheet for providers: https://www.fda.gov/media/038274/download     Fact sheet for patients: https://www.fda.gov/media/137776/download    Test performed by PCR.    Consider negative results in combination with clinical observations, patient history, and epidemiological information.    [facility] Draw [[id number]] Collected: 12/15/21 1439    Specimen: Blood Updated: 12/15/21 1545    Narrative:      The following orders were created for panel order [facility] Draw.  Procedure                               Abnormality         Status                     ---------                               -----------          ------                     Green Top (Gel)[[id number]]                                  Final result               Lavender Top[[id number]]                                     Final result               Red Top[[id number]]                                          Final result               Light Blue Top[[id number]]                                   Final result                 Please view results for these tests on the individual orders.    Lavender Top [[id number]] Collected: 12/15/21 1439    Specimen: Blood Updated: 12/15/21 1545     Extra Tube hold for add-on     Comment: Auto resulted       Red Top [[id number]] Collected: 12/15/21 1439    Specimen: Blood Updated: 12/15/21 1545     Extra Tube Hold for add-ons.     Comment: Auto resulted.       Light Blue Top [[id number]] Collected: 12/15/21 1439    Specimen: Blood Updated: 12/15/21 1545     Extra Tube hold for add-on     Comment: Auto resulted       Green Top (Gel) [[id number]] Collected: 12/15/21 1439    Specimen: Blood Updated: 12/15/21 1545     Extra Tube Hold for add-ons.     Comment: Auto resulted.       Comprehensive Metabolic Panel [[id number]]  (Abnormal) Collected: 12/15/21 1439    Specimen: Blood Updated: 12/15/21 1532     Glucose 124 mg/dL      BUN 27 mg/dL      Creatinine 0.51 mg/dL      Sodium 137 mmol/L      Potassium 4.4 mmol/L      Chloride 102 mmol/L      CO2 28.0 mmol/L      Calcium 9.1 mg/dL      Total Protein 6.4 g/dL      Albumin 3.80 g/dL      ALT (SGPT) 15 U/L      AST (SGOT) 16 U/L      Alkaline Phosphatase 71 U/L      Total Bilirubin 0.3 mg/dL      eGFR Non African Amer 111 mL/min/1.73      Globulin 2.6 gm/dL      A/G Ratio 1.5 g/dL      BUN/Creatinine Ratio 52.9     Anion Gap 7.0 mmol/L     Narrative:      GFR Normal >60  Chronic Kidney Disease <60  Kidney Failure <15      BNP [[id number]]  (Normal) Collected: 12/15/21 1439    Specimen: Blood Updated: 12/15/21 1530     proBNP 798.7 pg/mL     Narrative:      Among patients with dyspnea,  NT-proBNP is highly sensitive for the detection of acute congestive heart failure. In addition NT-proBNP of <300 pg/ml effectively rules out acute congestive heart failure with 99% negative predictive value.    Results may be falsely decreased if patient taking Biotin.      CBC & Differential [[id number]]  (Abnormal) Collected: 12/15/21 1439    Specimen: Blood Updated: 12/15/21 1518    Narrative:      The following orders were created for panel order CBC & Differential.  Procedure                               Abnormality         Status                     ---------                               -----------         ------                     CBC Auto Differential[[id number]]        Abnormal            Final result                 Please view results for these tests on the individual orders.    CBC Auto Differential [[id number]]  (Abnormal) Collected: 12/15/21 1439    Specimen: Blood Updated: 12/15/21 1518     WBC 7.51 10*3/mm3      RBC 3.81 10*6/mm3      Hemoglobin 10.8 g/dL      Hematocrit 35.1 %      MCV 92.1 fL      MCH 28.3 pg      MCHC 30.8 g/dL      RDW 14.6 %      RDW-SD 48.8 fl      MPV 9.1 fL      Platelets 303 10*3/mm3      Neutrophil % 66.7 %      Lymphocyte % 24.9 %      Monocyte % 5.7 %      Eosinophil % 1.6 %      Basophil % 0.3 %      Immature Grans % 0.8 %      Neutrophils, Absolute 5.01 10*3/mm3      Lymphocytes, Absolute 1.87 10*3/mm3      Monocytes, Absolute 0.43 10*3/mm3      Eosinophils, Absolute 0.12 10*3/mm3      Basophils, Absolute 0.02 10*3/mm3      Immature Grans, Absolute 0.06 10*3/mm3      nRBC 0.0 /100 WBC         Imaging Results (Last 72 Hours)     Procedure Component Value Units Date/Time    US Venous Doppler Lower Extremity Bilateral (duplex) [[id number]] Collected: 12/15/21 1941     Updated: 12/15/21 1944    Narrative:      History: Swelling       Impression:      Impression: There is no evidence of deep venous thrombosis or  superficial thrombophlebitis of right or left lower  extremities.     Comments: Bilateral lower extremity venous duplex exam was performed  using color Doppler flow, Doppler waveform analysis, and grayscale  imaging, with and without compression. There is no evidence of deep  venous thrombosis in the common femoral, superficial femoral, popliteal,  peroneal, anterior tibial, and posterior tibial veins bilaterally. No  thrombus is identified in the saphenofemoral junctions and greater  saphenous veins bilaterally.         This report was finalized on 12/15/2021 19:41 by Dr. Edwar Woods MD.    XR Chest 1 View [[id number]] Collected: 12/15/21 1635     Updated: 12/15/21 1641    Narrative:      Frontal upright radiograph of the chest 12/15/2021 4:29 PM CST     HISTORY: Fluid overload, pedal edema     COMPARISON: Chest exam dated 11/19/2021.     FINDINGS:      1.5 cm nodular opacity in the left lung base, just lateral to the left  heart border. Lungs are otherwise clear. No lung consolidation. No  pleural effusion or pneumothorax. Heart size is stable. Pulmonary  vasculature are nondilated. Left chest wall dual-chamber pacemaker. The  osseous structures and surrounding soft tissues demonstrate no acute  abnormality.       Impression:      1. No evidence of acute infiltrate or pulmonary edema.   2. There is a suspicious 1.5 cm nodular opacity in the left lung base.  This is not resolved from the earlier comparison exam. A small primary  lung malignancy is not excluded.        This report was finalized on 12/15/2021 16:38 by Dr Alex Hong, .             ASSESSMENT/PLAN       Examination and evaluation of wound(s) was performed.  Discussed care of Unna boots with patient and patient's daughter.      DIAGNOSIS:   Bilateral lower extremity edema  Impaired mobility  Friction injury of right lateral ankle  Coronary artery disease  Acute on chronic diastolic heart failure    PLAN:   Patient is being discharged today with Unna boots in place.  Patient will have home health  at home to provide care.    Discussed findings and treatment plan including risks, benefits, and treatment options with patient and patient's daughter in detail. Patient agreed with treatment plan.      This document has been electronically signed by TESSA Syed on 12/17/2021 11:31 CST       Time spent in face-to-face evaluation, chart review, planning and education 25 minutes with greater than 50% of time spent with patient and in coordination of care.

## 2021-12-17 NOTE — DISCHARGE SUMMARY
[facility] Medicine Services  DISCHARGE SUMMARY       Date of Admission: 12/15/2021  Date of Discharge:  12/17/2021  Primary Care Physician: Florentin Costello MD    Presenting Problem/History of Present Illness:  Bilateral lower extremity edema [R60.0]     Final Discharge Diagnoses:  Active Hospital Problems    Diagnosis    • **Acute on chronic diastolic heart failure (HCC)    • Bilateral lower extremity edema    • Lung nodule    • CAD (coronary artery disease)    • GERD (gastroesophageal reflux disease)    • HLD (hyperlipidemia)        Consults: None.    Procedures Performed: None.    Pertinent Test Results:   Results for orders placed during the hospital encounter of 12/15/21    Adult Transthoracic Echo Complete W/ Cont if Necessary Per Protocol    Interpretation Summary  · Left ventricular systolic function is normal. Left ventricular ejection fraction appears to be 61 - 65%.  · Left ventricular wall thickness is consistent with mild concentric hypertrophy.  · Normal right ventricular cavity size and systolic function noted.  · No significant valvular abnormalities identified on this study.    Imaging Results (Last 72 Hours)     Procedure Component Value Units Date/Time    US Venous Doppler Lower Extremity Bilateral (duplex) [[id number]] Collected: 12/15/21 1941     Updated: 12/15/21 1944    Narrative:      History: Swelling       Impression:      Impression: There is no evidence of deep venous thrombosis or  superficial thrombophlebitis of right or left lower extremities.     Comments: Bilateral lower extremity venous duplex exam was performed  using color Doppler flow, Doppler waveform analysis, and grayscale  imaging, with and without compression. There is no evidence of deep  venous thrombosis in the common femoral, superficial femoral, popliteal,  peroneal, anterior tibial, and posterior tibial veins bilaterally. No  thrombus is identified in the saphenofemoral  junctions and greater  saphenous veins bilaterally.         This report was finalized on 12/15/2021 19:41 by Dr. Edwar Woods MD.    XR Chest 1 View [[id number]] Collected: 12/15/21 1635     Updated: 12/15/21 1641    Narrative:      Frontal upright radiograph of the chest 12/15/2021 4:29 PM CST     HISTORY: Fluid overload, pedal edema     COMPARISON: Chest exam dated 11/19/2021.     FINDINGS:      1.5 cm nodular opacity in the left lung base, just lateral to the left  heart border. Lungs are otherwise clear. No lung consolidation. No  pleural effusion or pneumothorax. Heart size is stable. Pulmonary  vasculature are nondilated. Left chest wall dual-chamber pacemaker. The  osseous structures and surrounding soft tissues demonstrate no acute  abnormality.       Impression:      1. No evidence of acute infiltrate or pulmonary edema.   2. There is a suspicious 1.5 cm nodular opacity in the left lung base.  This is not resolved from the earlier comparison exam. A small primary  lung malignancy is not excluded.        This report was finalized on 12/15/2021 16:38 by Dr Alex Hong, .        Lab Results (last 72 hours)     Procedure Component Value Units Date/Time    Basic Metabolic Panel [[id number]]  (Abnormal) Collected: 12/17/21 0615    Specimen: Blood Updated: 12/17/21 0703     Glucose 176 mg/dL      BUN 30 mg/dL      Creatinine 0.65 mg/dL      Sodium 142 mmol/L      Potassium 3.9 mmol/L      Chloride 100 mmol/L      CO2 32.0 mmol/L      Calcium 9.6 mg/dL      eGFR Non African Amer 84 mL/min/1.73      BUN/Creatinine Ratio 46.2     Anion Gap 10.0 mmol/L     Narrative:      GFR Normal >60  Chronic Kidney Disease <60  Kidney Failure <15      POC Glucose Once [[id number]]  (Abnormal) Collected: 12/16/21 1642    Specimen: Blood Updated: 12/16/21 1702     Glucose 151 mg/dL      Comment: : [id number] Rolf PeggyElvis ID: [id number]       POC Glucose Once [[id number]]  (Abnormal) Collected: 12/16/21 1116    Specimen:  Blood Updated: 12/16/21 1128     Glucose 220 mg/dL      Comment: : [id number] Rolf PanyeenMeter ID: [id number]       POC Glucose Once [[id number]]  (Abnormal) Collected: 12/16/21 0722    Specimen: Blood Updated: 12/16/21 0733     Glucose 141 mg/dL      Comment: : [id number] Rolf ElmerenMeter ID: [id number]       Hemoglobin A1c [[id number]]  (Abnormal) Collected: 12/16/21 0617    Specimen: Blood Updated: 12/16/21 0711     Hemoglobin A1C 7.30 %     Narrative:      Hemoglobin A1C Ranges:    Increased Risk for Diabetes  5.7% to 6.4%  Diabetes                     >= 6.5%  Diabetic Goal                < 7.0%    Basic Metabolic Panel [[id number]]  (Abnormal) Collected: 12/16/21 0617    Specimen: Blood Updated: 12/16/21 0705     Glucose 148 mg/dL      BUN 27 mg/dL      Creatinine 0.66 mg/dL      Sodium 141 mmol/L      Potassium 4.2 mmol/L      Chloride 102 mmol/L      CO2 30.0 mmol/L      Calcium 9.3 mg/dL      eGFR Non African Amer 83 mL/min/1.73      BUN/Creatinine Ratio 40.9     Anion Gap 9.0 mmol/L     Narrative:      GFR Normal >60  Chronic Kidney Disease <60  Kidney Failure <15      Phosphorus [[id number]]  (Normal) Collected: 12/16/21 0617    Specimen: Blood Updated: 12/16/21 0705     Phosphorus 4.2 mg/dL     Magnesium [[id number]]  (Normal) Collected: 12/16/21 0617    Specimen: Blood Updated: 12/16/21 0705     Magnesium 2.1 mg/dL     TSH [[id number]]  (Normal) Collected: 12/16/21 0617    Specimen: Blood Updated: 12/16/21 0705     TSH 1.350 uIU/mL     CBC Auto Differential [[id number]]  (Abnormal) Collected: 12/16/21 0618    Specimen: Blood Updated: 12/16/21 0640     WBC 6.28 10*3/mm3      RBC 3.80 10*6/mm3      Hemoglobin 11.0 g/dL      Hematocrit 35.4 %      MCV 93.2 fL      MCH 28.9 pg      MCHC 31.1 g/dL      RDW 14.3 %      RDW-SD 48.5 fl      MPV 8.9 fL      Platelets 280 10*3/mm3      Neutrophil % 62.2 %      Lymphocyte % 27.1 %      Monocyte % 7.0 %      Eosinophil % 2.1 %      Basophil % 0.6 %      Immature  Grans % 1.0 %      Neutrophils, Absolute 3.91 10*3/mm3      Lymphocytes, Absolute 1.70 10*3/mm3      Monocytes, Absolute 0.44 10*3/mm3      Eosinophils, Absolute 0.13 10*3/mm3      Basophils, Absolute 0.04 10*3/mm3      Immature Grans, Absolute 0.06 10*3/mm3      nRBC 0.0 /100 WBC     POC Glucose Once [[id number]]  (Normal) Collected: 12/15/21 1835    Specimen: Blood Updated: 12/15/21 1857     Glucose 111 mg/dL      Comment: : [id number] Justice Diamond ID: [id number]       COVID PRE-OP / PRE-PROCEDURE SCREENING ORDER (NO ISOLATION) - Swab, Nasal Cavity [[id number]]  (Normal) Collected: 12/15/21 1532    Specimen: Swab from Nasal Cavity Updated: 12/15/21 1626    Narrative:      The following orders were created for panel order COVID PRE-OP / PRE-PROCEDURE SCREENING ORDER (NO ISOLATION) - Swab, Nasal Cavity.  Procedure                               Abnormality         Status                     ---------                               -----------         ------                     COVID-19,[employer] IN-[facility]AY[facility]...[[id number]]  Normal              Final result                 Please view results for these tests on the individual orders.    COVID-19,[employer] IN-HOUSE,Nasal Swab No Transport Media 3-4 HR TAT - Swab, Nasal Cavity [[id number]]  (Normal) Collected: 12/15/21 1532    Specimen: Swab from Nasal Cavity Updated: 12/15/21 1626     COVID19 Not Detected    Narrative:      Fact sheet for providers: https://www.fda.gov/media/280835/download     Fact sheet for patients: https://www.fda.gov/media/493741/download    Test performed by PCR.    Consider negative results in combination with clinical observations, patient history, and epidemiological information.    [place] Draw [[id number]] Collected: 12/15/21 1439    Specimen: Blood Updated: 12/15/21 1545    Narrative:      The following orders were created for panel order [place] Draw.  Procedure                               Abnormality         Status                      ---------                               -----------         ------                     Green Top (Gel)[[id number]]                                  Final result               Lavender Top[[id number]]                                     Final result               Red Top[[id number]]                                          Final result               Light Blue Top[[id number]]                                   Final result                 Please view results for these tests on the individual orders.    Lavender Top [[id number]] Collected: 12/15/21 1439    Specimen: Blood Updated: 12/15/21 1545     Extra Tube hold for add-on     Comment: Auto resulted       Red Top [[id number]] Collected: 12/15/21 1439    Specimen: Blood Updated: 12/15/21 1545     Extra Tube Hold for add-ons.     Comment: Auto resulted.       Light Blue Top [[id number]] Collected: 12/15/21 1439    Specimen: Blood Updated: 12/15/21 1545     Extra Tube hold for add-on     Comment: Auto resulted       Green Top (Gel) [[id number]] Collected: 12/15/21 1439    Specimen: Blood Updated: 12/15/21 1545     Extra Tube Hold for add-ons.     Comment: Auto resulted.       Comprehensive Metabolic Panel [[id number]]  (Abnormal) Collected: 12/15/21 1439    Specimen: Blood Updated: 12/15/21 1532     Glucose 124 mg/dL      BUN 27 mg/dL      Creatinine 0.51 mg/dL      Sodium 137 mmol/L      Potassium 4.4 mmol/L      Chloride 102 mmol/L      CO2 28.0 mmol/L      Calcium 9.1 mg/dL      Total Protein 6.4 g/dL      Albumin 3.80 g/dL      ALT (SGPT) 15 U/L      AST (SGOT) 16 U/L      Alkaline Phosphatase 71 U/L      Total Bilirubin 0.3 mg/dL      eGFR Non African Amer 111 mL/min/1.73      Globulin 2.6 gm/dL      A/G Ratio 1.5 g/dL      BUN/Creatinine Ratio 52.9     Anion Gap 7.0 mmol/L     Narrative:      GFR Normal >60  Chronic Kidney Disease <60  Kidney Failure <15      BNP [[id number]]  (Normal) Collected: 12/15/21 1439    Specimen: Blood Updated: 12/15/21 1530     proBNP 798.7  pg/mL     Narrative:      Among patients with dyspnea, NT-proBNP is highly sensitive for the detection of acute congestive heart failure. In addition NT-proBNP of <300 pg/ml effectively rules out acute congestive heart failure with 99% negative predictive value.    Results may be falsely decreased if patient taking Biotin.      CBC & Differential [[id number]]  (Abnormal) Collected: 12/15/21 1439    Specimen: Blood Updated: 12/15/21 1518    Narrative:      The following orders were created for panel order CBC & Differential.  Procedure                               Abnormality         Status                     ---------                               -----------         ------                     CBC Auto Differential[[id number]]        Abnormal            Final result                 Please view results for these tests on the individual orders.    CBC Auto Differential [[id number]]  (Abnormal) Collected: 12/15/21 1439    Specimen: Blood Updated: 12/15/21 1518     WBC 7.51 10*3/mm3      RBC 3.81 10*6/mm3      Hemoglobin 10.8 g/dL      Hematocrit 35.1 %      MCV 92.1 fL      MCH 28.3 pg      MCHC 30.8 g/dL      RDW 14.6 %      RDW-SD 48.8 fl      MPV 9.1 fL      Platelets 303 10*3/mm3      Neutrophil % 66.7 %      Lymphocyte % 24.9 %      Monocyte % 5.7 %      Eosinophil % 1.6 %      Basophil % 0.3 %      Immature Grans % 0.8 %      Neutrophils, Absolute 5.01 10*3/mm3      Lymphocytes, Absolute 1.87 10*3/mm3      Monocytes, Absolute 0.43 10*3/mm3      Eosinophils, Absolute 0.12 10*3/mm3      Basophils, Absolute 0.02 10*3/mm3      Immature Grans, Absolute 0.06 10*3/mm3      nRBC 0.0 /100 WBC         Chief Complaint on Day of Discharge: Overall, patient reports feeling well.  Denies shortness of breath, chest pain or pressure.  Denies orthopnea, paroxysmal nocturnal dyspnea.  She feels lower extremity edema has improved.  Tolerating a diet.  Denies any acute complaints.  She is eager for discharge home.    Hospital  "Course:  The patient is a 89 y.o. female who presented to [facility] on 12/15 as directed by Dr. Abdullahi due to excessive pedal edema.  She has a past medical history significant for coronary artery disease, sick sinus syndrome status post pacemaker, and type 2 diabetes without insulin dependence.  She had recently been taken off of her diuretic by Dr. Costello.  Unsure why diuretic was discontinued.  .  Chest x-ray did not show pulmonary edema or acute infiltrate.  Venous Doppler bilateral lower extremities negative for thrombosis.  She was started on IV Lasix.  She was admitted to the hospitalist service for further evaluation and management.    She was continued on IV Lasix 20 mg twice daily.  She is -5 L since admission.  Transthoracic echocardiogram showed preserved ejection fraction.  States that Dr. Costello recently took her off Bumex 1 mg daily.  Will transition Lasix to Bumex 0.5 mg oral twice daily at time of discharge.  Physical therapy placed Unna boots to bilateral lower extremities.  She has worked with physical and Occupational Therapy.  Therapy recommends home with assist and home health.    Overall, patient reports feeling well.  Denies shortness of breath, chest pain or pressure.  Denies orthopnea, paroxysmal nocturnal dyspnea.  She feels lower extremity edema has improved.  Tolerating a diet.  Denies any acute complaints.  She is eager for discharge home.  Reports she has an upcoming appointment with Dr. Costello's office on 12/20.  She is to follow-up with TESSA Avila on scheduled appointment 1/31/2022.    Condition on Discharge:  Medically stable.    Physical Exam on Discharge:  /50 (BP Location: Left arm, Patient Position: Lying)   Pulse 67   Temp 98.2 °F (36.8 °C) (Oral)   Resp 16   Ht 165.1 cm (65\")   Wt 61.2 kg (135 lb)   SpO2 99%   BMI 22.47 kg/m²   Physical Exam  Constitutional:       Appearance: She is well-developed.      Comments: Up in bed.  No acute " distress.  Tolerating room air.  Daughter at bedside.   HENT:      Head: Normocephalic and atraumatic.      Right Ear: Decreased hearing noted.      Left Ear: Decreased hearing noted.   Eyes:      General: No scleral icterus.     Conjunctiva/sclera: Conjunctivae normal.      Pupils: Pupils are equal, round, and reactive to light.   Neck:      Vascular: No JVD.   Cardiovascular:      Rate and Rhythm: Normal rate and regular rhythm.      Heart sounds: Normal heart sounds. No murmur heard.       Pulmonary:      Effort: Pulmonary effort is normal.      Breath sounds: Normal breath sounds. No wheezing or rales.   Abdominal:      General: Bowel sounds are normal. There is no distension.      Palpations: Abdomen is soft.      Tenderness: There is no abdominal tenderness. There is no guarding.   Musculoskeletal:         General: Normal range of motion.      Cervical back: Neck supple.      Right lower leg: Edema present.      Left lower leg: Edema present.   Lymphadenopathy:      Cervical: No cervical adenopathy.   Skin:     General: Skin is warm and dry.   Neurological:      Mental Status: She is alert and oriented to person, place, and time.      Cranial Nerves: No cranial nerve deficit.   Psychiatric:         Behavior: Behavior normal.     Discharge Disposition:  Home-Health Care [facility]    Discharge Medications:     Discharge Medications      Changes to Medications      Instructions Start Date   bumetanide 0.5 MG tablet  Commonly known as: BUMEX  What changed:   · medication strength  · when to take this   1 mg, Oral, 2 Times Daily         Continue These Medications      Instructions Start Date   aspirin 81 MG tablet   81 mg, Oral, Daily      carvedilol 3.125 MG tablet  Commonly known as: COREG   3.125 mg, Oral, 2 Times Daily With Meals      clopidogrel 75 MG tablet  Commonly known as: PLAVIX   75 mg, Oral, Daily      glimepiride 1 MG tablet  Commonly known as: AMARYL   1 mg, Oral, 2 Times Daily       HYDROcodone-acetaminophen 5-325 MG per tablet  Commonly known as: NORCO   1 tablet, Oral, 2 Times Daily PRN      MIRALAX PO   17 g, Oral, Daily PRN      nitroglycerin 0.4 MG SL tablet  Commonly known as: NITROSTAT   0.4 mg, Sublingual, Daily      omeprazole 20 MG capsule  Commonly known as: priLOSEC   20 mg, Oral, Daily             Discharge Diet:   Diet Instructions     Diet: Regular, Cardiac, Consistent Carbohydrate      Discharge Diet:  Regular  Cardiac  Consistent Carbohydrate             Activity at Discharge:   Activity Instructions     Activity as Tolerated            Discharge Care Plan/Instructions:   1.  Reports she has an upcoming appointment with Dr. Costello's office on 12/20.    2.  She is to follow-up with TESSA Avila on scheduled appointment 1/31/2022.  3.  Bumex 0.5 mg twice daily.  Check daily weights.  4.  Seek evaluation for worsening symptoms.    Follow-up Appointments:   Future Appointments   Date Time Provider Department Center   1/31/2022  9:00 AM Lyn Conde APRN [facility] CD  [facility]   5/19/2022 10:45 AM [facility]       Test Results Pending at Discharge: None.    Electronically signed by TESSA Cross, 12/17/21, 11:04 CST.    Time: 35 minutes.

## 2021-12-18 ENCOUNTER — READMISSION MANAGEMENT (OUTPATIENT)
Dept: CALL CENTER | Facility: HOSPITAL | Age: 86
End: 2021-12-18

## 2021-12-18 NOTE — OUTREACH NOTE
Prep Survey      Responses   [facility] facility patient discharged from? [facility]   Is LACE score < 7 ? Yes   Emergency Room discharge w/ pulse ox? No   Eligibility Readm Mgmt   Discharge diagnosis Acute on chronic diastolic heart failure    Does the patient have one of the following disease processes/diagnoses(primary or secondary)? CHF   Does the patient have Home health ordered? Yes   What is the Home health agency?  [facility]    Is there a DME ordered? No   Prep survey completed? Yes          Sarai Cummings RN

## 2021-12-19 ENCOUNTER — HOME CARE VISIT (OUTPATIENT)
Dept: HOME HEALTH SERVICES | Facility: CLINIC | Age: 86
End: 2021-12-19

## 2021-12-19 PROCEDURE — G0299 HHS/HOSPICE OF RN EA 15 MIN: HCPCS

## 2021-12-20 VITALS
OXYGEN SATURATION: 98 % | SYSTOLIC BLOOD PRESSURE: 104 MMHG | RESPIRATION RATE: 18 BRPM | HEART RATE: 55 BPM | TEMPERATURE: 97.5 F | DIASTOLIC BLOOD PRESSURE: 50 MMHG

## 2021-12-21 ENCOUNTER — HOME CARE VISIT (OUTPATIENT)
Dept: HOME HEALTH SERVICES | Facility: CLINIC | Age: 86
End: 2021-12-21

## 2021-12-21 ENCOUNTER — READMISSION MANAGEMENT (OUTPATIENT)
Dept: CALL CENTER | Facility: HOSPITAL | Age: 86
End: 2021-12-21

## 2021-12-21 VITALS
TEMPERATURE: 97.6 F | OXYGEN SATURATION: 99 % | DIASTOLIC BLOOD PRESSURE: 54 MMHG | RESPIRATION RATE: 18 BRPM | HEART RATE: 84 BPM | SYSTOLIC BLOOD PRESSURE: 122 MMHG

## 2021-12-21 PROCEDURE — G0151 HHCP-SERV OF PT,EA 15 MIN: HCPCS

## 2021-12-21 NOTE — OUTREACH NOTE
CHF Week 2 Survey      Responses   [facility] patient discharged from? [facility]   Does the patient have one of the following disease processes/diagnoses(primary or secondary)? CHF   Call start time 1415   Call end time 1429   Discharge diagnosis Acute on chronic diastolic heart failure    Is patient permission given to speak with other caregiver? Yes   List who call center can speak with children   Medication alerts for this patient PCP changed bumex to 1po BID. Pt does not want to take the evening dose to avoid urinating all night. But she took both this morning. Suggested speaking with PCP about how early in day can take the doses of Bumex to avoid being up all night urinating.    Meds reviewed with patient/caregiver? Yes   Is the patient having any side effects they believe may be caused by any medication additions or changes? No   Does the patient have all medications ordered at discharge? N/A   Is the patient taking all medications as directed (includes completed medication regime)? Yes   Does the patient have a primary care provider?  Yes   Does the patient have an appointment with their PCP within 7 days of discharge? Yes   Has the patient kept scheduled appointments due by today? Yes   What is the Home health agency?  [facility]    Has home health visited the patient within 72 hours of discharge? Yes   Pulse Ox monitoring None   Psychosocial issues? No   Comments LE edema is going down per pt report. HH is changing the wrappings on LE. Pt is not elevating LE very much.    Did the patient receive a copy of their discharge instructions? Yes   Nursing interventions Reviewed instructions with patient   What is the patient's perception of their health status since discharge? Improving   Nursing interventions Nurse provided patient education   Is the patient weighing daily? Yes   Does the patient have scales? Yes   Daily weight interventions Education provided on importance of daily weight   Is the  patient able to teach back Heart Failure diet management? Yes   Is the patient able to teach back signs and symptoms of worsening condition? (i.e. weight gain, shortness of air, etc.) Yes   If the patient is a current smoker, are they able to teach back resources for cessation? Not a smoker   Is the patient/caregiver able to teach back the hierarchy of who to call/visit for symptoms/problems? PCP, Specialist, Home health nurse, Urgent Care, ED, 911 Yes          Isa Steve RN

## 2021-12-22 ENCOUNTER — HOME CARE VISIT (OUTPATIENT)
Dept: HOME HEALTH SERVICES | Facility: CLINIC | Age: 86
End: 2021-12-22

## 2021-12-22 VITALS
OXYGEN SATURATION: 99 % | HEART RATE: 69 BPM | TEMPERATURE: 97.4 F | DIASTOLIC BLOOD PRESSURE: 56 MMHG | SYSTOLIC BLOOD PRESSURE: 154 MMHG | RESPIRATION RATE: 18 BRPM

## 2021-12-22 PROCEDURE — G0299 HHS/HOSPICE OF RN EA 15 MIN: HCPCS

## 2021-12-23 ENCOUNTER — HOME CARE VISIT (OUTPATIENT)
Dept: HOME HEALTH SERVICES | Facility: CLINIC | Age: 86
End: 2021-12-23

## 2021-12-23 PROCEDURE — G0152 HHCP-SERV OF OT,EA 15 MIN: HCPCS

## 2021-12-24 NOTE — HOME HEALTH
OCCUPATIONAL THERAPY EVALUATION    REASON FOR REFERRAL-   Patient referred due to weakness and decline in ADL's  PRIMARY DIAGNOSIS-   Hypertensive heart disease  SECONDARY DIAGNOSIS-  CHF, DM  SURGICAL PROCEDURES-  none recently    PREVIOUS OCCUPATIONAL THERAPY- none this episode  OXYGEN USE- n/a    CLINICAL FINDINGS:  WOUND / SKIN CONDITION- wrapped B LE  EDEMA-  wrapped B LE  PAIN-     5/10                     LOCATION-    R leg                              CONTROLLED WITH-  pain meds--Norco  MENTAL STATUS-   A/O x 3  COGNITION-   WNL  VISION-  wears glasses   HEARING LOSS-  hearing aides  HEAD CONTROL-   good          TRUNK STRENGTH- fair    EQUIPMENT : Rollator, BSC over toilet    BALANCE: see PT eval for formal assessment  SITTING BALANCE-  good  STANDING BALANCE-  fair-      WEIGHT BEARING STATUS/PRECAUTIONS-   WBAT  ASSISTIVE DEVICE-   rollator and standard walker    MEDICAL NECESSITY- Patient requires skills of OT to address ADL safety and completion as well as IADL tasks. Patient could benefit from UB strengthening and UB HEP.  PATIENT GOAL FOR THIS EPISODE OF CARE-     REHAB POTENTIAL-   good for stated goals    DATE OF NEXT APPOINTMENT WITH DOCTOR- pending  ANTICIPATED DISCHARGE PLAN-   d/c when goals met or max potential achieved  PATIENT / CAREGIVER AGREE WITH DISCHARGE PLAN- yes  COMMUNICATION / CARE COORDINATION-staff re OT eval

## 2021-12-27 ENCOUNTER — HOME CARE VISIT (OUTPATIENT)
Dept: HOME HEALTH SERVICES | Facility: CLINIC | Age: 86
End: 2021-12-27

## 2021-12-27 VITALS
HEART RATE: 60 BPM | RESPIRATION RATE: 18 BRPM | SYSTOLIC BLOOD PRESSURE: 120 MMHG | DIASTOLIC BLOOD PRESSURE: 52 MMHG | OXYGEN SATURATION: 97 % | TEMPERATURE: 97.5 F

## 2021-12-27 VITALS
SYSTOLIC BLOOD PRESSURE: 130 MMHG | DIASTOLIC BLOOD PRESSURE: 52 MMHG | OXYGEN SATURATION: 98 % | TEMPERATURE: 97.6 F | HEART RATE: 71 BPM

## 2021-12-27 PROCEDURE — G0299 HHS/HOSPICE OF RN EA 15 MIN: HCPCS

## 2021-12-27 PROCEDURE — G0157 HHC PT ASSISTANT EA 15: HCPCS

## 2021-12-28 ENCOUNTER — HOME CARE VISIT (OUTPATIENT)
Dept: HOME HEALTH SERVICES | Facility: CLINIC | Age: 86
End: 2021-12-28

## 2021-12-28 VITALS
HEART RATE: 66 BPM | RESPIRATION RATE: 18 BRPM | SYSTOLIC BLOOD PRESSURE: 120 MMHG | DIASTOLIC BLOOD PRESSURE: 48 MMHG | TEMPERATURE: 98.3 F | OXYGEN SATURATION: 98 %

## 2021-12-28 PROCEDURE — G0157 HHC PT ASSISTANT EA 15: HCPCS

## 2021-12-28 NOTE — HOME HEALTH
No falls since last visit. No changes in medication or insurance. Plan for next visit is to educate on gt technique, HEP, and improve balance.

## 2021-12-30 ENCOUNTER — HOME CARE VISIT (OUTPATIENT)
Dept: HOME HEALTH SERVICES | Facility: CLINIC | Age: 86
End: 2021-12-30

## 2021-12-30 VITALS
HEART RATE: 75 BPM | RESPIRATION RATE: 16 BRPM | DIASTOLIC BLOOD PRESSURE: 56 MMHG | SYSTOLIC BLOOD PRESSURE: 144 MMHG | OXYGEN SATURATION: 98 % | TEMPERATURE: 98 F

## 2021-12-30 PROCEDURE — G0299 HHS/HOSPICE OF RN EA 15 MIN: HCPCS

## 2021-12-31 ENCOUNTER — HOME CARE VISIT (OUTPATIENT)
Dept: HOME HEALTH SERVICES | Facility: CLINIC | Age: 86
End: 2021-12-31

## 2021-12-31 VITALS
HEART RATE: 73 BPM | OXYGEN SATURATION: 97 % | DIASTOLIC BLOOD PRESSURE: 64 MMHG | SYSTOLIC BLOOD PRESSURE: 144 MMHG | RESPIRATION RATE: 18 BRPM | TEMPERATURE: 98.2 F

## 2021-12-31 PROCEDURE — G0152 HHCP-SERV OF OT,EA 15 MIN: HCPCS

## 2022-01-03 ENCOUNTER — HOME CARE VISIT (OUTPATIENT)
Dept: HOME HEALTH SERVICES | Facility: CLINIC | Age: 87
End: 2022-01-03

## 2022-01-03 VITALS
DIASTOLIC BLOOD PRESSURE: 58 MMHG | HEART RATE: 71 BPM | RESPIRATION RATE: 18 BRPM | TEMPERATURE: 98.2 F | SYSTOLIC BLOOD PRESSURE: 138 MMHG | OXYGEN SATURATION: 99 %

## 2022-01-03 PROCEDURE — G0157 HHC PT ASSISTANT EA 15: HCPCS

## 2022-01-03 NOTE — HOME HEALTH
Subjective: Patient here this date eating lunch in kitchen on arrival. Patient's family began showing up after OT arrival. Four family members present.    Falls: None    Medication changes: None    Insurance changes: None    Edema: legs wrapped with unna boots    Medical necessity for continued visits: Patient continues to require skills of OT to address safety, strength and ADL's.    Next MD appt: uncertain    Plan for next visit: Plan to address UB HEP and follow up on equipment needs.

## 2022-01-03 NOTE — HOME HEALTH
Pt states no falls or change to medication or insurance since last visit. Plan for next visit is to work on gt mechanics and bal.

## 2022-01-04 ENCOUNTER — HOME CARE VISIT (OUTPATIENT)
Dept: HOME HEALTH SERVICES | Facility: CLINIC | Age: 87
End: 2022-01-04

## 2022-01-04 VITALS
TEMPERATURE: 98.1 F | HEART RATE: 69 BPM | RESPIRATION RATE: 16 BRPM | DIASTOLIC BLOOD PRESSURE: 66 MMHG | SYSTOLIC BLOOD PRESSURE: 126 MMHG | OXYGEN SATURATION: 96 %

## 2022-01-04 PROCEDURE — G0299 HHS/HOSPICE OF RN EA 15 MIN: HCPCS

## 2022-01-05 ENCOUNTER — HOME CARE VISIT (OUTPATIENT)
Dept: HOME HEALTH SERVICES | Facility: CLINIC | Age: 87
End: 2022-01-05

## 2022-01-05 VITALS
DIASTOLIC BLOOD PRESSURE: 52 MMHG | OXYGEN SATURATION: 97 % | RESPIRATION RATE: 18 BRPM | HEART RATE: 76 BPM | TEMPERATURE: 98 F | SYSTOLIC BLOOD PRESSURE: 120 MMHG

## 2022-01-05 PROCEDURE — G0157 HHC PT ASSISTANT EA 15: HCPCS

## 2022-01-05 NOTE — HOME HEALTH
Pt states no falls or changes in medication or insurance. Plan for next visit is to educate in safety awareness, bal, & gt training.

## 2022-01-07 ENCOUNTER — HOME CARE VISIT (OUTPATIENT)
Dept: HOME HEALTH SERVICES | Facility: CLINIC | Age: 87
End: 2022-01-07

## 2022-01-10 ENCOUNTER — HOME CARE VISIT (OUTPATIENT)
Dept: HOME HEALTH SERVICES | Facility: CLINIC | Age: 87
End: 2022-01-10

## 2022-01-10 VITALS
DIASTOLIC BLOOD PRESSURE: 62 MMHG | OXYGEN SATURATION: 96 % | HEART RATE: 62 BPM | TEMPERATURE: 97.6 F | SYSTOLIC BLOOD PRESSURE: 140 MMHG | RESPIRATION RATE: 18 BRPM

## 2022-01-10 PROCEDURE — G0157 HHC PT ASSISTANT EA 15: HCPCS

## 2022-01-10 NOTE — HOME HEALTH
Pt states no falls or changes to medication or insurance. Pt states c/o increased pain which is keeping her awake. Pt states she hasn't been able to do her exercises due to pain. Therapist notified Drea Banerjee RN who states to have pt put on her compression stockings. Pt's daughter assisted pt to rui stockings. Plan for next visit is to perfrom [facility] gt/bal test and monitor HEP and gt program.

## 2022-01-11 ENCOUNTER — HOME CARE VISIT (OUTPATIENT)
Dept: HOME HEALTH SERVICES | Facility: CLINIC | Age: 87
End: 2022-01-11

## 2022-01-11 VITALS
SYSTOLIC BLOOD PRESSURE: 120 MMHG | OXYGEN SATURATION: 98 % | HEART RATE: 66 BPM | RESPIRATION RATE: 18 BRPM | DIASTOLIC BLOOD PRESSURE: 60 MMHG | TEMPERATURE: 97.8 F

## 2022-01-11 PROCEDURE — G0152 HHCP-SERV OF OT,EA 15 MIN: HCPCS

## 2022-01-11 NOTE — HOME HEALTH
SUBJECTIVE: Patient plans to continue sponge bath - due to limited ROM in LEs not able to raise legs enough to clear edge of tub on tub bench.    MEDICATION CHANGES: none reported    FALLS SINCE LAST VISIT: no reported falls    WOUND / SKIN CONDITION: no wounds reported    OXYGEN SAFETY COMPLIANCE: N/A    EXPLANATION OF UNMET GOALS- goals met    DISCHARGE STATUS     TO SELF / TO FAMILY      DISCHARGE CONDITION -   GOOD        DISCHARGE REASON    GOALS MET       INSTRUCTIONS HOME PROGRAM PROVIDED TO (Ms Recinos)          CONTENT- UE ROM          PATIENT CAREGIVER LEVEL OF COMPLIANCE- fair  UNMET NEEDS- N/A  SERVICES CONTINUING- PT       PATIENT AND CAREGIVER (daughter) ARE AGREEABLE WITH DISCHARGE PLAN- yes

## 2022-01-12 ENCOUNTER — HOME CARE VISIT (OUTPATIENT)
Dept: HOME HEALTH SERVICES | Facility: CLINIC | Age: 87
End: 2022-01-12

## 2022-01-12 VITALS
HEART RATE: 70 BPM | TEMPERATURE: 98.5 F | SYSTOLIC BLOOD PRESSURE: 104 MMHG | OXYGEN SATURATION: 99 % | DIASTOLIC BLOOD PRESSURE: 48 MMHG | RESPIRATION RATE: 18 BRPM

## 2022-01-12 PROCEDURE — G0157 HHC PT ASSISTANT EA 15: HCPCS

## 2022-01-13 ENCOUNTER — HOME CARE VISIT (OUTPATIENT)
Dept: HOME HEALTH SERVICES | Facility: CLINIC | Age: 87
End: 2022-01-13

## 2022-01-13 VITALS
SYSTOLIC BLOOD PRESSURE: 122 MMHG | HEART RATE: 67 BPM | TEMPERATURE: 97.4 F | RESPIRATION RATE: 18 BRPM | DIASTOLIC BLOOD PRESSURE: 48 MMHG | OXYGEN SATURATION: 98 %

## 2022-01-13 PROCEDURE — G0299 HHS/HOSPICE OF RN EA 15 MIN: HCPCS

## 2022-01-17 ENCOUNTER — HOME CARE VISIT (OUTPATIENT)
Dept: HOME HEALTH SERVICES | Facility: CLINIC | Age: 87
End: 2022-01-17

## 2022-01-17 VITALS
HEART RATE: 60 BPM | SYSTOLIC BLOOD PRESSURE: 142 MMHG | RESPIRATION RATE: 18 BRPM | OXYGEN SATURATION: 99 % | TEMPERATURE: 97.9 F | DIASTOLIC BLOOD PRESSURE: 56 MMHG

## 2022-01-17 PROCEDURE — G0299 HHS/HOSPICE OF RN EA 15 MIN: HCPCS

## 2022-01-25 ENCOUNTER — HOME CARE VISIT (OUTPATIENT)
Dept: HOME HEALTH SERVICES | Facility: CLINIC | Age: 87
End: 2022-01-25

## 2022-01-25 VITALS
HEART RATE: 63 BPM | RESPIRATION RATE: 16 BRPM | DIASTOLIC BLOOD PRESSURE: 52 MMHG | SYSTOLIC BLOOD PRESSURE: 128 MMHG | OXYGEN SATURATION: 98 % | TEMPERATURE: 98.4 F

## 2022-01-25 PROCEDURE — G0299 HHS/HOSPICE OF RN EA 15 MIN: HCPCS

## 2022-01-28 PROBLEM — I10 PRIMARY HYPERTENSION: Chronic | Status: ACTIVE | Noted: 2022-01-28

## 2022-01-28 PROBLEM — I10 PRIMARY HYPERTENSION: Status: ACTIVE | Noted: 2022-01-28

## 2022-01-28 PROBLEM — I50.32 CHRONIC DIASTOLIC HEART FAILURE (HCC): Chronic | Status: ACTIVE | Noted: 2021-12-15

## 2022-01-28 PROBLEM — Z95.0 PRESENCE OF CARDIAC PACEMAKER: Status: ACTIVE | Noted: 2022-01-28

## 2022-01-28 PROBLEM — I47.29 NSVT (NONSUSTAINED VENTRICULAR TACHYCARDIA) (HCC): Chronic | Status: ACTIVE | Noted: 2018-09-13

## 2022-01-28 PROBLEM — Z95.0 PRESENCE OF CARDIAC PACEMAKER: Chronic | Status: ACTIVE | Noted: 2022-01-28

## 2022-01-28 PROBLEM — Z95.5 PRESENCE OF DRUG COATED STENT IN RIGHT CORONARY ARTERY: Chronic | Status: ACTIVE | Noted: 2018-07-23

## 2022-01-28 PROBLEM — I50.32 CHRONIC DIASTOLIC HEART FAILURE (HCC): Status: ACTIVE | Noted: 2021-12-15

## 2022-01-28 NOTE — PROGRESS NOTES
Chief Complaint  Congestive Heart Failure (Has been feeling well. No SOA. Weighs daily. ) and Edema (BLE. Has improved. )    Subjective          Emiliano Recinos presents to [facility] CARDIOLOGY for routine follow-up of hospital discharge on 12/17/2021 for acute on chronic diastolic congestive heart failure.  She was treated with IV diuresis and discharged home on Bumex 0.5 mg twice daily, however she has not been taking bumex. She has chronic diastolic congestive heart failure, coronary artery disease status post 3.0 x 18 mm Promus drug-eluting stent to the right coronary artery on 3/29/2011 for myocardial infarction, sick sinus syndrome status post pacemaker, nonsustained ventricular tachycardia, hypertension, hyperlipidemia, type 2 diabetes mellitus and GERD. She continues to report increased bilateral lower extremity edema. Patient denies chest pain, shortness of breath, palpitations, dizziness, syncope, orthopnea, PND or decreased stamina.  Patient denies any signs of bleeding.    Congestive Heart Failure  Presents for follow-up visit. Associated symptoms include edema. Pertinent negatives include no abdominal pain, chest pain, chest pressure, claudication, fatigue, muscle weakness, near-syncope, nocturia, orthopnea, palpitations, paroxysmal nocturnal dyspnea, shortness of breath or unexpected weight change. The symptoms have been stable. Her past medical history is significant for CAD. Compliance with total regimen is 51-75%. Compliance with diet is 51-75%. Compliance with exercise is 51-75%. Compliance with medications is %.   Coronary Artery Disease  Presents for follow-up visit. Symptoms include leg swelling. Pertinent negatives include no chest pain, chest pressure, chest tightness, dizziness, muscle weakness, palpitations, shortness of breath or weight gain. Risk factors include hyperlipidemia. Her past medical history is significant for CHF. The symptoms have been stable.  "Compliance with diet is variable. Compliance with exercise is variable. Compliance with medications is good.   Hypertension  This is a chronic problem. The current episode started more than 1 year ago. The problem is controlled. Pertinent negatives include no anxiety, blurred vision, chest pain, headaches, malaise/fatigue, neck pain, orthopnea, palpitations, peripheral edema, PND, shortness of breath or sweats. Risk factors for coronary artery disease include post-menopausal state, dyslipidemia and diabetes mellitus. Current antihypertension treatment includes beta blockers and diuretics. The current treatment provides significant improvement. Hypertensive end-organ damage includes CAD/MI and heart failure.   Hyperlipidemia  This is a chronic problem. The current episode started more than 1 year ago. Pertinent negatives include no chest pain or shortness of breath. She is currently on no antihyperlipidemic treatment. Risk factors for coronary artery disease include post-menopausal, hypertension, dyslipidemia and diabetes mellitus.       Objective   Vital Signs:   /58   Pulse 85   Ht 165.1 cm (65\")   Wt 59 kg (130 lb)   SpO2 100%   BMI 21.63 kg/m²     Vitals and nursing note reviewed.   Constitutional:       General: Not in acute distress.     Appearance: Normal and healthy appearance. Well-developed, normal weight and not in distress. Not diaphoretic.   Eyes:      General: Lids are normal.         Right eye: No discharge.         Left eye: No discharge.      Conjunctiva/sclera: Conjunctivae normal.      Pupils: Pupils are equal, round, and reactive to light.   HENT:      Head: Normocephalic and atraumatic.      Jaw: There is normal jaw occlusion.      Right Ear: External ear normal.      Left Ear: External ear normal.      Nose: Nose normal.   Neck:      Thyroid: No thyromegaly.      Vascular: No carotid bruit, JVD or JVR. JVD normal.      Trachea: Trachea normal. No tracheal deviation.   Pulmonary:      " Effort: Pulmonary effort is normal. No respiratory distress.      Breath sounds: Normal breath sounds. No decreased breath sounds. No wheezing. No rhonchi. No rales.   Chest:      Chest wall: Not tender to palpatation.   Cardiovascular:      PMI at left midclavicular line. Normal rate. Regular rhythm. Normal S1. Normal S2.      Murmurs: There is no murmur.      No gallop. No click. No rub.   Pulses:     Intact distal pulses. No decreased pulses.   Edema:     Peripheral edema present.     Pretibial: 2+ pitting edema of the left pretibial area and 3+ pitting edema of the right pretibial area.     Ankle: 2+ pitting edema of the left ankle and 3+ pitting edema of the right ankle.     Feet: 2+ pitting edema of the left foot and 3+ pitting edema of the right foot.  Abdominal:      General: Bowel sounds are normal. There is no distension.      Palpations: Abdomen is soft.      Tenderness: There is no abdominal tenderness.   Musculoskeletal: Normal range of motion.         General: No tenderness or deformity.      Cervical back: Normal range of motion and neck supple. Skin:     General: Skin is warm and dry.      Coloration: Skin is not pale.      Findings: No erythema or rash.   Neurological:      General: No focal deficit present.      Mental Status: Alert, oriented to person, place, and time and oriented to person, place and time.   Psychiatric:         Attention and Perception: Attention and perception normal.         Mood and Affect: Mood and affect normal.         Speech: Speech normal.         Behavior: Behavior normal.         Thought Content: Thought content normal.         Cognition and Memory: Cognition and memory normal.         Judgment: Judgment normal.        Result Review :   The following data was reviewed by: TESSA Griffith on 01/31/2022:  Common labs    Common Labsle 12/15/21 12/15/21 12/16/21 12/16/21 12/16/21 12/17/21    1439 1439 [id number] [id number]    Glucose  124 (A) 148 (A)   176 (A)   BUN  27  (A) 27 (A)   30 (A)   Creatinine  0.51 (A) 0.66   0.65   eGFR Non  Am  111 83   84   Sodium  137 141   142   Potassium  4.4 4.2   3.9   Chloride  102 102   100   Calcium  9.1 9.3   9.6   Albumin  3.80       Total Bilirubin  0.3       Alkaline Phosphatase  71       AST (SGOT)  16       ALT (SGPT)  15       WBC 7.51    6.28    Hemoglobin 10.8 (A)    11.0 (A)    Hematocrit 35.1    35.4    Platelets 303    280    Hemoglobin A1C    7.30 (A)     (A) Abnormal value            Data reviewed: Cardiology studies 2d echo 12/17/21          Assessment and Plan    Diagnoses and all orders for this visit:    1. Chronic diastolic heart failure (HCC) (Primary)- NYHA class II. Compensated. Start Entresto 24/26 mg twice daily. Decrease bumex to 0.5 mg daily as needed. BMP in one week.  Reviewed signs and symptoms of CHF and what to report with the patient. Patient instructed to restrict sodium and weigh daily. Report weight gain of greater than 2 lbs overnight or 5 lbs in 1 week. Pt verbalized understanding of instructions and plan of care.     2. Coronary artery disease involving native coronary artery of native heart without angina pectoris- no clinical signs of ischemia.     3. Presence of drug coated stent in right coronary artery- 3.0 x 18 mm Promus drug-eluting stent to the right coronary artery on 3/29/2011 for myocardial infarction. Pt continues on aspirin and plavix. Denies bleeding.     4. SSS (sick sinus syndrome) ([facility])- s/p pacemaker.     5. Presence of cardiac pacemaker- interrogated 11/19/21. 11.6% a-paced, <1% v-paced. No events. Normal function, stable thresholds and adequate battery.     6. NSVT (nonsustained ventricular tachycardia) ([facility])- no recurrence on recent device checks.     7. Primary hypertension-  Blood pressures are well controlled. Continue to monitor following above medication changes. Monitor and record daily blood pressure. Report readings consistently higher than 130/90 or consistently lower  than 100/60.     8. Hyperlipidemia LDL goal <70- management per PCP. Pt is not on statin.     9. Type 2 diabetes mellitus without complication, without long-term current use of insulin (HCC)- management per PCP.       Follow Up   Return in about 4 weeks (around 2/28/2022) for Next scheduled follow up.  Patient was given instructions and counseling regarding her condition or for health maintenance advice. Please see specific information pulled into the AVS if appropriate.

## 2022-01-31 ENCOUNTER — OFFICE VISIT (OUTPATIENT)
Dept: CARDIOLOGY | Facility: CLINIC | Age: 87
End: 2022-01-31

## 2022-01-31 VITALS
HEART RATE: 85 BPM | SYSTOLIC BLOOD PRESSURE: 118 MMHG | BODY MASS INDEX: 21.66 KG/M2 | WEIGHT: 130 LBS | DIASTOLIC BLOOD PRESSURE: 58 MMHG | OXYGEN SATURATION: 100 % | HEIGHT: 65 IN

## 2022-01-31 DIAGNOSIS — I47.29 NSVT (NONSUSTAINED VENTRICULAR TACHYCARDIA): Chronic | ICD-10-CM

## 2022-01-31 DIAGNOSIS — Z95.5 PRESENCE OF DRUG COATED STENT IN RIGHT CORONARY ARTERY: Chronic | ICD-10-CM

## 2022-01-31 DIAGNOSIS — E11.9 TYPE 2 DIABETES MELLITUS WITHOUT COMPLICATION, WITHOUT LONG-TERM CURRENT USE OF INSULIN: Chronic | ICD-10-CM

## 2022-01-31 DIAGNOSIS — I50.32 CHRONIC DIASTOLIC HEART FAILURE: Primary | Chronic | ICD-10-CM

## 2022-01-31 DIAGNOSIS — Z95.0 PRESENCE OF CARDIAC PACEMAKER: Chronic | ICD-10-CM

## 2022-01-31 DIAGNOSIS — I10 PRIMARY HYPERTENSION: Chronic | ICD-10-CM

## 2022-01-31 DIAGNOSIS — E78.5 HYPERLIPIDEMIA LDL GOAL <70: ICD-10-CM

## 2022-01-31 DIAGNOSIS — I49.5 SSS (SICK SINUS SYNDROME): Chronic | ICD-10-CM

## 2022-01-31 DIAGNOSIS — I25.10 CORONARY ARTERY DISEASE INVOLVING NATIVE CORONARY ARTERY OF NATIVE HEART WITHOUT ANGINA PECTORIS: Chronic | ICD-10-CM

## 2022-01-31 PROCEDURE — 99214 OFFICE O/P EST MOD 30 MIN: CPT | Performed by: NURSE PRACTITIONER

## 2022-01-31 RX ORDER — SACUBITRIL AND VALSARTAN 24; 26 MG/1; MG/1
1 TABLET, FILM COATED ORAL 2 TIMES DAILY
Qty: 60 TABLET | Refills: 11 | Status: SHIPPED | OUTPATIENT
Start: 2022-01-31

## 2022-01-31 RX ORDER — BUMETANIDE 0.5 MG/1
0.5 TABLET ORAL DAILY PRN
Qty: 90 TABLET | Refills: 3 | Status: SHIPPED | OUTPATIENT
Start: 2022-01-31

## 2022-02-25 NOTE — PROGRESS NOTES
Chief Complaint  Congestive Heart Failure (Started Entresto LOV. States that she has not felt much improvement.), Results (Lab), and Edema (BLE. Has been taking Bumex daily)    Subjective          Emiliano Recinos presents to [facility] CARDIOLOGY for routine follow-up of medication adjustments.  She was started on Entresto 24/26 mg twice daily and Bumex was decreased to 0.5 mg daily as needed at her last office visit on 1/31/2022.  Follow-up BMP revealed normal creatinine and potassium.  She has chronic diastolic congestive heart failure, coronary artery disease status post 3.0 x 18 mm Promus drug-eluting stent to the right coronary artery on 3/29/2011 for myocardial infarction, sick sinus syndrome status post pacemaker, nonsustained ventricular tachycardia, hypertension, hyperlipidemia, type 2 diabetes mellitus and GERD. She continues to report increased bilateral lower extremity edema. Patient denies chest pain, shortness of breath, palpitations, dizziness, syncope, orthopnea, PND or decreased stamina.  Patient denies any signs of bleeding.    Congestive Heart Failure  Presents for follow-up visit. Associated symptoms include edema. Pertinent negatives include no abdominal pain, chest pain, chest pressure, claudication, fatigue, muscle weakness, near-syncope, nocturia, orthopnea, palpitations, paroxysmal nocturnal dyspnea, shortness of breath or unexpected weight change. The symptoms have been stable. Her past medical history is significant for CAD. Compliance with total regimen is 51-75%. Compliance with diet is 51-75%. Compliance with exercise is 51-75%. Compliance with medications is %.   Coronary Artery Disease  Presents for follow-up visit. Symptoms include leg swelling. Pertinent negatives include no chest pain, chest pressure, chest tightness, dizziness, muscle weakness, palpitations, shortness of breath or weight gain. Risk factors include hyperlipidemia. Her past medical history is  "significant for CHF. The symptoms have been stable. Compliance with diet is variable. Compliance with exercise is variable. Compliance with medications is good.   Hypertension  This is a chronic problem. The current episode started more than 1 year ago. The problem is controlled. Pertinent negatives include no anxiety, blurred vision, chest pain, headaches, malaise/fatigue, neck pain, orthopnea, palpitations, peripheral edema, PND, shortness of breath or sweats. Risk factors for coronary artery disease include post-menopausal state, dyslipidemia and diabetes mellitus. Current antihypertension treatment includes beta blockers and diuretics. The current treatment provides significant improvement. Hypertensive end-organ damage includes CAD/MI and heart failure.   Hyperlipidemia  This is a chronic problem. The current episode started more than 1 year ago. Pertinent negatives include no chest pain or shortness of breath. She is currently on no antihyperlipidemic treatment. Risk factors for coronary artery disease include post-menopausal, hypertension, dyslipidemia and diabetes mellitus.       Objective   Vital Signs:   /60   Pulse 67   Ht 165.1 cm (65\")   Wt 58.1 kg (128 lb)   SpO2 98%   BMI 21.30 kg/m²     Vitals and nursing note reviewed.   Constitutional:       General: Not in acute distress.     Appearance: Normal and healthy appearance. Well-developed, normal weight and not in distress. Not diaphoretic.   Eyes:      General: Lids are normal.         Right eye: No discharge.         Left eye: No discharge.      Conjunctiva/sclera: Conjunctivae normal.      Pupils: Pupils are equal, round, and reactive to light.   HENT:      Head: Normocephalic and atraumatic.      Jaw: There is normal jaw occlusion.      Right Ear: External ear normal.      Left Ear: External ear normal.      Nose: Nose normal.   Neck:      Thyroid: No thyromegaly.      Vascular: No carotid bruit, JVD or JVR. JVD normal.      Trachea: " Trachea normal. No tracheal deviation.   Pulmonary:      Effort: Pulmonary effort is normal. No respiratory distress.      Breath sounds: Normal breath sounds. No decreased breath sounds. No wheezing. No rhonchi. No rales.   Chest:      Chest wall: Not tender to palpatation.   Cardiovascular:      PMI at left midclavicular line. Normal rate. Regular rhythm. Normal S1. Normal S2.      Murmurs: There is no murmur.      No gallop. No click. No rub.   Pulses:     Intact distal pulses. No decreased pulses.   Edema:     Peripheral edema present.     Pretibial: trace edema of the left pretibial area and 3+ pitting edema of the right pretibial area.     Ankle: trace edema of the left ankle and 3+ pitting edema of the right ankle.     Feet: trace edema of the left foot and 3+ pitting edema of the right foot.  Abdominal:      General: Bowel sounds are normal. There is no distension.      Palpations: Abdomen is soft.      Tenderness: There is no abdominal tenderness.   Musculoskeletal: Normal range of motion.         General: No tenderness or deformity.      Cervical back: Normal range of motion and neck supple. Skin:     General: Skin is warm and dry.      Coloration: Skin is not pale.      Findings: No erythema or rash.   Neurological:      General: No focal deficit present.      Mental Status: Alert, oriented to person, place, and time and oriented to person, place and time.   Psychiatric:         Attention and Perception: Attention and perception normal.         Mood and Affect: Mood and affect normal.         Speech: Speech normal.         Behavior: Behavior normal.         Thought Content: Thought content normal.         Cognition and Memory: Cognition and memory normal.         Judgment: Judgment normal.        Result Review :   The following data was reviewed by: TESSA Griffith on 01/31/2022:  Common labs    Common Labsle 12/15/21 12/15/21 12/16/21 12/16/21 12/16/21 12/17/21    1439 1439 0617 0617 0618     Glucose  124 (A) 148 (A)   176 (A)   BUN  27 (A) 27 (A)   30 (A)   Creatinine  0.51 (A) 0.66   0.65   eGFR Non  Am  111 83   84   Sodium  137 141   142   Potassium  4.4 4.2   3.9   Chloride  102 102   100   Calcium  9.1 9.3   9.6   Albumin  3.80       Total Bilirubin  0.3       Alkaline Phosphatase  71       AST (SGOT)  16       ALT (SGPT)  15       WBC 7.51    6.28    Hemoglobin 10.8 (A)    11.0 (A)    Hematocrit 35.1    35.4    Platelets 303    280    Hemoglobin A1C    7.30 (A)     (A) Abnormal value              Data reviewed: Cardiology studies 2d echo 12/17/21          Assessment and Plan    Diagnoses and all orders for this visit:    1. Chronic diastolic heart failure (HCC) (Primary)- NYHA class II. Compensated. Reviewed signs and symptoms of CHF and what to report with the patient. Patient instructed to restrict sodium and weigh daily. Report weight gain of greater than 2 lbs overnight or 5 lbs in 1 week. Pt verbalized understanding of instructions and plan of care.     2. Coronary artery disease involving native coronary artery of native heart without angina pectoris- no clinical signs of ischemia.     3. Presence of drug coated stent in right coronary artery- 3.0 x 18 mm Promus drug-eluting stent to the right coronary artery on 3/29/2011 for myocardial infarction. Pt continues on aspirin and plavix. Denies bleeding.     4. SSS (sick sinus syndrome) ([facility])- s/p pacemaker.     5. Presence of cardiac pacemaker- interrogated 11/19/21. 11.6% a-paced, <1% v-paced. No events. Normal function, stable thresholds and adequate battery.     6. NSVT (nonsustained ventricular tachycardia) ([facility])- no recurrence on recent device checks.     7. Primary hypertension-  Blood pressures are well controlled. Continue to monitor following above medication changes. Monitor and record daily blood pressure. Report readings consistently higher than 130/90 or consistently lower than 100/60.     8. Hyperlipidemia LDL goal <70-  management per PCP. Pt is not on statin.     9. Type 2 diabetes mellitus without complication, without long-term current use of insulin (HCC)- management per PCP.       Follow Up   Return in about 3 months (around 5/28/2022) for Next scheduled follow up.  Patient was given instructions and counseling regarding her condition or for health maintenance advice. Please see specific information pulled into the AVS if appropriate.

## 2022-02-28 ENCOUNTER — OFFICE VISIT (OUTPATIENT)
Dept: CARDIOLOGY | Facility: CLINIC | Age: 87
End: 2022-02-28

## 2022-02-28 VITALS
WEIGHT: 128 LBS | DIASTOLIC BLOOD PRESSURE: 60 MMHG | SYSTOLIC BLOOD PRESSURE: 134 MMHG | BODY MASS INDEX: 21.33 KG/M2 | OXYGEN SATURATION: 98 % | HEIGHT: 65 IN | HEART RATE: 67 BPM

## 2022-02-28 DIAGNOSIS — I47.29 NSVT (NONSUSTAINED VENTRICULAR TACHYCARDIA): Chronic | ICD-10-CM

## 2022-02-28 DIAGNOSIS — E78.5 HYPERLIPIDEMIA LDL GOAL <70: Chronic | ICD-10-CM

## 2022-02-28 DIAGNOSIS — Z95.5 PRESENCE OF DRUG COATED STENT IN RIGHT CORONARY ARTERY: Chronic | ICD-10-CM

## 2022-02-28 DIAGNOSIS — I25.10 CORONARY ARTERY DISEASE INVOLVING NATIVE CORONARY ARTERY OF NATIVE HEART WITHOUT ANGINA PECTORIS: Chronic | ICD-10-CM

## 2022-02-28 DIAGNOSIS — I49.5 SSS (SICK SINUS SYNDROME): Chronic | ICD-10-CM

## 2022-02-28 DIAGNOSIS — I50.32 CHRONIC DIASTOLIC HEART FAILURE: Primary | Chronic | ICD-10-CM

## 2022-02-28 DIAGNOSIS — Z95.0 PRESENCE OF CARDIAC PACEMAKER: Chronic | ICD-10-CM

## 2022-02-28 DIAGNOSIS — E11.9 TYPE 2 DIABETES MELLITUS WITHOUT COMPLICATION, WITHOUT LONG-TERM CURRENT USE OF INSULIN: Chronic | ICD-10-CM

## 2022-02-28 DIAGNOSIS — I10 PRIMARY HYPERTENSION: Chronic | ICD-10-CM

## 2022-02-28 PROCEDURE — 99214 OFFICE O/P EST MOD 30 MIN: CPT | Performed by: NURSE PRACTITIONER

## 2022-03-01 ENCOUNTER — TRANSCRIBE ORDERS (OUTPATIENT)
Dept: HOME HEALTH SERVICES | Facility: HOME HEALTHCARE | Age: 87
End: 2022-03-01

## 2022-03-01 ENCOUNTER — HOME HEALTH ADMISSION (OUTPATIENT)
Dept: HOME HEALTH SERVICES | Facility: HOME HEALTHCARE | Age: 87
End: 2022-03-01

## 2022-03-01 DIAGNOSIS — I50.33 ACUTE ON CHRONIC DIASTOLIC HEART FAILURE: Primary | ICD-10-CM

## 2022-03-04 ENCOUNTER — HOME CARE VISIT (OUTPATIENT)
Dept: HOME HEALTH SERVICES | Facility: CLINIC | Age: 87
End: 2022-03-04

## 2022-03-04 PROCEDURE — G0299 HHS/HOSPICE OF RN EA 15 MIN: HCPCS

## 2022-03-07 ENCOUNTER — HOME CARE VISIT (OUTPATIENT)
Dept: HOME HEALTH SERVICES | Facility: CLINIC | Age: 87
End: 2022-03-07

## 2022-03-07 VITALS
HEART RATE: 65 BPM | OXYGEN SATURATION: 97 % | SYSTOLIC BLOOD PRESSURE: 112 MMHG | TEMPERATURE: 97.2 F | DIASTOLIC BLOOD PRESSURE: 50 MMHG | RESPIRATION RATE: 18 BRPM

## 2022-03-07 VITALS
OXYGEN SATURATION: 96 % | DIASTOLIC BLOOD PRESSURE: 58 MMHG | SYSTOLIC BLOOD PRESSURE: 124 MMHG | TEMPERATURE: 96.9 F | HEART RATE: 83 BPM | RESPIRATION RATE: 18 BRPM

## 2022-03-07 PROCEDURE — G0151 HHCP-SERV OF PT,EA 15 MIN: HCPCS

## 2022-03-07 NOTE — HOME HEALTH
Patient reports she is having pain in her groin. She said her x-ray was negative but her MRI showed R hip OA.

## 2022-03-10 ENCOUNTER — HOME CARE VISIT (OUTPATIENT)
Dept: HOME HEALTH SERVICES | Facility: CLINIC | Age: 87
End: 2022-03-10

## 2022-03-10 VITALS
SYSTOLIC BLOOD PRESSURE: 110 MMHG | TEMPERATURE: 99.3 F | RESPIRATION RATE: 18 BRPM | DIASTOLIC BLOOD PRESSURE: 62 MMHG | OXYGEN SATURATION: 93 % | HEART RATE: 77 BPM

## 2022-03-10 PROCEDURE — G0300 HHS/HOSPICE OF LPN EA 15 MIN: HCPCS

## 2022-03-11 ENCOUNTER — HOME CARE VISIT (OUTPATIENT)
Dept: HOME HEALTH SERVICES | Facility: CLINIC | Age: 87
End: 2022-03-11

## 2022-03-11 VITALS
RESPIRATION RATE: 18 BRPM | DIASTOLIC BLOOD PRESSURE: 42 MMHG | OXYGEN SATURATION: 99 % | SYSTOLIC BLOOD PRESSURE: 117 MMHG | HEART RATE: 86 BPM | TEMPERATURE: 99.4 F

## 2022-03-11 PROCEDURE — G0157 HHC PT ASSISTANT EA 15: HCPCS

## 2022-03-11 NOTE — HOME HEALTH
"SUBJECTIVE: Patient states she says she is feeling better today. She does note she has some pain in the right hip/leg. She says she needs a hip replacement but she is \"too old\". Her daughter says she screams when she touches her legs. Patient says they hurt a lot too.     FOCUS OF CARE/SKILLED NEED: strengthening, gait train    TEACHING/INTERVENTIONS: gait mechanics, HEP, off loading/positioning  techniques to help alleviate pressure at buttocks    PROGRESS TOWARD GOALS: Patient exhbits improvement with gait distance and saftey when ambulating up/down steps.    PHYSICIAN CONTACT: NO    INSURANCE CHANGES? NO    FALLS SINCE LAST VISIT? NO    MEDICATION CHANGES SINCE LAST VISIT? NO    PLAN FOR NEXT VISIT: Continue gait, strength, and transfer training."

## 2022-03-11 NOTE — HOME HEALTH
FOCUS OF CARE/SKILLED NEED: Daily weights, CHF home management, Assess edema, Instruct edema, fall prevention, medication administration, emergeny preparedness, assess feet, assess pain, pressure wound prevention.    TEACHING/INTERVENTIONS: Daily weight, SHF home management, Assess edema, FAll prevention, medication administration, emergency preparedness, assessed feet, assessed pain    PROGRESS TOWARD GOALS: Patient is knowledgeable of her medications, purpose, schedule; patient states the edema has decreased some, no lesions noted to ángel lwr extr/feet, patient denies pain today    PHYSICIAN CONTACT: none    FALLS SINCE LAST VISIT?  none    MEDICATION CHANGES SINCE LAST VISIT?  none    PLAN FOR NEXT VISIT: Assess, weight, assess, ángel lwr extr/feet, assess pain, assess edeam, CHF home management, fall prevention, pressure wound prevention

## 2022-03-15 ENCOUNTER — HOME CARE VISIT (OUTPATIENT)
Dept: HOME HEALTH SERVICES | Facility: CLINIC | Age: 87
End: 2022-03-15

## 2022-03-15 VITALS
TEMPERATURE: 98 F | SYSTOLIC BLOOD PRESSURE: 100 MMHG | HEART RATE: 71 BPM | OXYGEN SATURATION: 98 % | DIASTOLIC BLOOD PRESSURE: 46 MMHG | RESPIRATION RATE: 18 BRPM

## 2022-03-15 PROCEDURE — G0157 HHC PT ASSISTANT EA 15: HCPCS

## 2022-03-15 NOTE — HOME HEALTH
No falls or changes to insurance or medication since last visit. Current health set up today w/pt/cg educated. Pt does not have internet, therefore, unable to set up current health system. Next visit to educate on balance, gt training, & progressive ex program.

## 2022-03-17 ENCOUNTER — HOME CARE VISIT (OUTPATIENT)
Dept: HOME HEALTH SERVICES | Facility: CLINIC | Age: 87
End: 2022-03-17

## 2022-03-17 VITALS
TEMPERATURE: 98.4 F | HEART RATE: 77 BPM | OXYGEN SATURATION: 99 % | DIASTOLIC BLOOD PRESSURE: 58 MMHG | SYSTOLIC BLOOD PRESSURE: 92 MMHG | RESPIRATION RATE: 18 BRPM

## 2022-03-17 VITALS
HEART RATE: 75 BPM | DIASTOLIC BLOOD PRESSURE: 40 MMHG | TEMPERATURE: 97.2 F | SYSTOLIC BLOOD PRESSURE: 98 MMHG | OXYGEN SATURATION: 98 % | RESPIRATION RATE: 18 BRPM

## 2022-03-17 PROCEDURE — G0299 HHS/HOSPICE OF RN EA 15 MIN: HCPCS

## 2022-03-17 PROCEDURE — G0157 HHC PT ASSISTANT EA 15: HCPCS

## 2022-03-17 NOTE — HOME HEALTH
FOCUS OF CARE/SKILLED NEED: current health setup and teaching     TEACHING/INTERVENTIONS: AxO x 4  Patient complained of pain in the right hip. VS stable. current health set up teaching with pt daughter who will manage current health.    PROGRESS TOWARD GOALS: ongoing progressing    PHYSICIAN CONTACT: no need    INSURANCE CHANGES? none    FALLS SINCE LAST VISIT? no    MEDICATION CHANGES SINCE LAST VISIT? none    PLAN FOR NEXT VISIT: teaching education. review any issues with current health.

## 2022-03-17 NOTE — HOME HEALTH
Pt states no falls or changes to insurance or medication since last visit Next visit to educate on HEP and gt mechanics.

## 2022-03-21 ENCOUNTER — HOME CARE VISIT (OUTPATIENT)
Dept: HOME HEALTH SERVICES | Facility: CLINIC | Age: 87
End: 2022-03-21

## 2022-03-21 VITALS
RESPIRATION RATE: 18 BRPM | DIASTOLIC BLOOD PRESSURE: 48 MMHG | OXYGEN SATURATION: 97 % | HEART RATE: 67 BPM | SYSTOLIC BLOOD PRESSURE: 100 MMHG | TEMPERATURE: 97.8 F

## 2022-03-21 PROCEDURE — G0157 HHC PT ASSISTANT EA 15: HCPCS

## 2022-03-21 NOTE — HOME HEALTH
Pt states no falls or changes to insurance or medication since last visit. Next visit to edcuate on gt mechanics and balance training.

## 2022-03-22 ENCOUNTER — HOME CARE VISIT (OUTPATIENT)
Dept: HOME HEALTH SERVICES | Facility: CLINIC | Age: 87
End: 2022-03-22

## 2022-03-22 PROCEDURE — G0300 HHS/HOSPICE OF LPN EA 15 MIN: HCPCS

## 2022-03-23 VITALS
BODY MASS INDEX: 20.63 KG/M2 | TEMPERATURE: 97.8 F | SYSTOLIC BLOOD PRESSURE: 102 MMHG | RESPIRATION RATE: 18 BRPM | HEART RATE: 65 BPM | WEIGHT: 124 LBS | DIASTOLIC BLOOD PRESSURE: 58 MMHG | OXYGEN SATURATION: 98 %

## 2022-03-23 NOTE — HOME HEALTH
Pt complaining of pain in rt leg when ambulating 7/10. Appt with Dr. Umair Self on 2/25/22.  Instructed pt on the importance of daily weights, wear like clothing and void each time before weighing for accuracy. Pt v/u of teaching via teach back method. Instructed pt to report a 2lb weight gain in 24hrs or a 5lb weight gain within one week. Pt v/u of teaching via teach back.    Next SNV: Assess feet, instruct on prevention of pressure injuries, instruct on disease process of edema

## 2022-03-25 ENCOUNTER — HOME CARE VISIT (OUTPATIENT)
Dept: HOME HEALTH SERVICES | Facility: CLINIC | Age: 87
End: 2022-03-25

## 2022-03-25 VITALS
TEMPERATURE: 98 F | SYSTOLIC BLOOD PRESSURE: 90 MMHG | RESPIRATION RATE: 18 BRPM | DIASTOLIC BLOOD PRESSURE: 46 MMHG | HEART RATE: 62 BPM | OXYGEN SATURATION: 97 %

## 2022-03-25 PROCEDURE — G0157 HHC PT ASSISTANT EA 15: HCPCS

## 2022-03-25 NOTE — HOME HEALTH
Pt states she saw her family Dr today and he ordered her an antibiotic but she does not have it at this time. Pt denies falls or changes to insurance since last visit.

## 2022-03-29 ENCOUNTER — HOME CARE VISIT (OUTPATIENT)
Dept: HOME HEALTH SERVICES | Facility: CLINIC | Age: 87
End: 2022-03-29

## 2022-03-29 PROCEDURE — G0300 HHS/HOSPICE OF LPN EA 15 MIN: HCPCS

## 2022-03-30 VITALS
SYSTOLIC BLOOD PRESSURE: 102 MMHG | RESPIRATION RATE: 16 BRPM | TEMPERATURE: 98 F | HEART RATE: 79 BPM | BODY MASS INDEX: 20.63 KG/M2 | WEIGHT: 124 LBS | OXYGEN SATURATION: 96 % | DIASTOLIC BLOOD PRESSURE: 54 MMHG

## 2022-03-31 ENCOUNTER — DOCUMENTATION (OUTPATIENT)
Dept: CT IMAGING | Facility: HOSPITAL | Age: 87
End: 2022-03-31

## 2022-04-04 NOTE — HOME HEALTH
Assessed edema to BLE's. 1+ pitting edema noted to BLE's. Reminded pt/cg the importance of daily weights and sodium restrition. Instructed pt/cg to be mindful to elevate BLE's above level of heart for at least 30 min to an hour several times a day as needed and to apply compression stockings to reduce edema.PT is mindful to T&P at least every two hours and to off load heals when lying in bed to prevent the potential for pressure injuries. PT/CG v/u of teachings via teach back method.    Next SNV: Assess edema and daily weights

## 2022-04-11 ENCOUNTER — HOSPITAL ENCOUNTER (EMERGENCY)
Facility: HOSPITAL | Age: 87
Discharge: HOME | End: 2022-04-11
Attending: EMERGENCY MEDICINE | Admitting: EMERGENCY MEDICINE

## 2022-04-11 VITALS
BODY MASS INDEX: 22.32 KG/M2 | SYSTOLIC BLOOD PRESSURE: 125 MMHG | WEIGHT: 126 LBS | HEART RATE: 79 BPM | TEMPERATURE: 98.4 F | DIASTOLIC BLOOD PRESSURE: 80 MMHG | RESPIRATION RATE: 16 BRPM | OXYGEN SATURATION: 99 % | HEIGHT: 63 IN

## 2022-04-11 DIAGNOSIS — D64.9 CHRONIC ANEMIA: ICD-10-CM

## 2022-04-11 DIAGNOSIS — M79.89 LEG SWELLING: Primary | ICD-10-CM

## 2022-04-11 DIAGNOSIS — I50.9 CHRONIC CONGESTIVE HEART FAILURE, UNSPECIFIED HEART FAILURE TYPE: ICD-10-CM

## 2022-04-11 LAB
ALBUMIN SERPL-MCNC: 3.8 G/DL (ref 3.5–5.2)
ALBUMIN/GLOB SERPL: 1.8 G/DL
ALP SERPL-CCNC: 70 U/L (ref 39–117)
ALT SERPL W P-5'-P-CCNC: 14 U/L (ref 1–33)
ANION GAP SERPL CALCULATED.3IONS-SCNC: 9 MMOL/L (ref 5–15)
AST SERPL-CCNC: 17 U/L (ref 1–32)
BASOPHILS # BLD AUTO: 0.01 10*3/MM3 (ref 0–0.2)
BASOPHILS NFR BLD AUTO: 0.2 % (ref 0–1.5)
BILIRUB SERPL-MCNC: 0.6 MG/DL (ref 0–1.2)
BUN SERPL-MCNC: 32 MG/DL (ref 8–23)
BUN/CREAT SERPL: 50.8 (ref 7–25)
CALCIUM SPEC-SCNC: 9.2 MG/DL (ref 8.2–9.6)
CHLORIDE SERPL-SCNC: 103 MMOL/L (ref 98–107)
CO2 SERPL-SCNC: 26 MMOL/L (ref 22–29)
CREAT SERPL-MCNC: 0.63 MG/DL (ref 0.57–1)
DEPRECATED RDW RBC AUTO: 52.3 FL (ref 37–54)
EGFRCR SERPLBLD CKD-EPI 2021: 79.8 ML/MIN/1.73
EOSINOPHIL # BLD AUTO: 0.02 10*3/MM3 (ref 0–0.4)
EOSINOPHIL NFR BLD AUTO: 0.4 % (ref 0.3–6.2)
ERYTHROCYTE [DISTWIDTH] IN BLOOD BY AUTOMATED COUNT: 15.1 % (ref 12.3–15.4)
GLOBULIN UR ELPH-MCNC: 2.1 GM/DL
GLUCOSE SERPL-MCNC: 260 MG/DL (ref 65–99)
HCT VFR BLD AUTO: 33.7 % (ref 34–46.6)
HGB BLD-MCNC: 10.6 G/DL (ref 12–15.9)
IMM GRANULOCYTES # BLD AUTO: 0.03 10*3/MM3 (ref 0–0.05)
IMM GRANULOCYTES NFR BLD AUTO: 0.6 % (ref 0–0.5)
LYMPHOCYTES # BLD AUTO: 1.05 10*3/MM3 (ref 0.7–3.1)
LYMPHOCYTES NFR BLD AUTO: 20.9 % (ref 19.6–45.3)
MCH RBC QN AUTO: 29.4 PG (ref 26.6–33)
MCHC RBC AUTO-ENTMCNC: 31.5 G/DL (ref 31.5–35.7)
MCV RBC AUTO: 93.6 FL (ref 79–97)
MONOCYTES # BLD AUTO: 0.38 10*3/MM3 (ref 0.1–0.9)
MONOCYTES NFR BLD AUTO: 7.6 % (ref 5–12)
NEUTROPHILS NFR BLD AUTO: 3.53 10*3/MM3 (ref 1.7–7)
NEUTROPHILS NFR BLD AUTO: 70.3 % (ref 42.7–76)
NRBC BLD AUTO-RTO: 0 /100 WBC (ref 0–0.2)
NT-PROBNP SERPL-MCNC: 631.9 PG/ML (ref 0–1800)
PLATELET # BLD AUTO: 288 10*3/MM3 (ref 140–450)
PMV BLD AUTO: 9.1 FL (ref 6–12)
POTASSIUM SERPL-SCNC: 4.8 MMOL/L (ref 3.5–5.2)
PROT SERPL-MCNC: 5.9 G/DL (ref 6–8.5)
RBC # BLD AUTO: 3.6 10*6/MM3 (ref 3.77–5.28)
SODIUM SERPL-SCNC: 138 MMOL/L (ref 136–145)
WBC NRBC COR # BLD: 5.02 10*3/MM3 (ref 3.4–10.8)

## 2022-04-11 PROCEDURE — 96374 THER/PROPH/DIAG INJ IV PUSH: CPT

## 2022-04-11 PROCEDURE — 83880 ASSAY OF NATRIURETIC PEPTIDE: CPT | Performed by: EMERGENCY MEDICINE

## 2022-04-11 PROCEDURE — 99283 EMERGENCY DEPT VISIT LOW MDM: CPT

## 2022-04-11 PROCEDURE — 80053 COMPREHEN METABOLIC PANEL: CPT | Performed by: EMERGENCY MEDICINE

## 2022-04-11 PROCEDURE — 85025 COMPLETE CBC W/AUTO DIFF WBC: CPT | Performed by: EMERGENCY MEDICINE

## 2022-04-11 RX ORDER — SODIUM CHLORIDE 0.9 % (FLUSH) 0.9 %
10 SYRINGE (ML) INJECTION AS NEEDED
Status: DISCONTINUED | OUTPATIENT
Start: 2022-04-11 | End: 2022-04-11 | Stop reason: HOSPADM

## 2022-04-11 RX ORDER — BUMETANIDE 0.25 MG/ML
1 INJECTION INTRAMUSCULAR; INTRAVENOUS ONCE
Status: COMPLETED | OUTPATIENT
Start: 2022-04-11 | End: 2022-04-11

## 2022-04-11 RX ADMIN — BUMETANIDE 1 MG: 0.25 INJECTION INTRAMUSCULAR; INTRAVENOUS at 09:41

## 2022-04-11 NOTE — ED PROVIDER NOTES
Subjective   Patient is a 89-year-old female with a history of heart failure who presents to the ER with bilateral lower extremity edema.  Patient states her legs have been swollen for the last several months.  Patient states she was diagnosed with congestive heart failure and was recently prescribed Bumex.  Patient states she stopped her Bumex 2 days ago and her swelling has worsened slightly.  She denies any fever, chest pain, shortness of air, abdominal pain, nausea vomiting diarrhea, urinary changes, neurologic changes.  She also denies orthopnea and proximal nocturnal dyspnea.          Review of Systems   Constitutional: Negative.    HENT: Negative.    Eyes: Negative.    Respiratory: Negative.    Cardiovascular: Positive for leg swelling.   Gastrointestinal: Negative.    Endocrine: Negative.    Genitourinary: Negative.    Musculoskeletal: Negative.    Skin: Negative.    Allergic/Immunologic: Negative.    Neurological: Negative.    Hematological: Negative.    Psychiatric/Behavioral: Negative.    All other systems reviewed and are negative.      Past Medical History:   Diagnosis Date   • Chronic diastolic heart failure ([facility]) 12/15/2021   • Coronary artery disease involving native coronary artery of native heart without angina pectoris 9/6/2016   • Diabetes mellitus type 2, uncomplicated ([facility])    • GERD (gastroesophageal reflux disease)    • Hard of hearing    • History of myocardial infarction 03/29/2011    EF 45% 2/11 cath   • Hyperlipidemia LDL goal <70 9/6/2016   • NSVT (nonsustained ventricular tachycardia) ([facility]) 9/13/2018   • Presence of cardiac pacemaker 1/28/2022   • Presence of drug coated stent in right coronary artery 7/23/2018    3.0 x 18 mm Promus drug-eluting stent to the right coronary artery on 3/29/2011 for myocardial infarction   • Primary hypertension 1/28/2022   • SSS (sick sinus syndrome) ([facility])    • Syncope        Allergies   Allergen Reactions   • Metoclopramide Swelling     Legs swelled        Past Surgical History:   Procedure Laterality Date   • BLADDER SUSPENSION      with hysterectomy   • CARDIAC CATHETERIZATION     • CORONARY STENT PLACEMENT      x 1   • HYSTERECTOMY      total   • INSERT / REPLACE / REMOVE PACEMAKER     • PACEMAKER IMPLANTATION         Family History   Problem Relation Age of Onset   • Coronary artery disease Mother    • Heart attack Mother        Social History     Socioeconomic History   • Marital status:    Tobacco Use   • Smoking status: Never Smoker   • Smokeless tobacco: Never Used   Vaping Use   • Vaping Use: Never used   Substance and Sexual Activity   • Alcohol use: No   • Drug use: No   • Sexual activity: Defer           Objective   Physical Exam  Vitals and nursing note reviewed.   Constitutional:       Appearance: She is well-developed.   HENT:      Head: Normocephalic and atraumatic.   Eyes:      Conjunctiva/sclera: Conjunctivae normal.      Pupils: Pupils are equal, round, and reactive to light.   Cardiovascular:      Rate and Rhythm: Normal rate and regular rhythm.      Heart sounds: Normal heart sounds.   Pulmonary:      Effort: Pulmonary effort is normal.      Breath sounds: Normal breath sounds.   Abdominal:      Palpations: Abdomen is soft.      Tenderness: There is no abdominal tenderness.   Musculoskeletal:         General: No deformity. Normal range of motion.      Cervical back: Normal range of motion.   Skin:     General: Skin is warm.      Comments: 2+ pitting edema bilateral lower extremities   Neurological:      Mental Status: She is alert and oriented to person, place, and time.   Psychiatric:         Behavior: Behavior normal.         Procedures           ED Course      Patient was given Bumex.     Lab Results (last 24 hours)     Procedure Component Value Units Date/Time    CBC & Differential [[id number]]  (Abnormal) Collected: 04/11/22 0908    Specimen: Blood Updated: 04/11/22 0922    Narrative:      The following orders were created for  panel order CBC & Differential.  Procedure                               Abnormality         Status                     ---------                               -----------         ------                     CBC Auto Differential[[id number]]        Abnormal            Final result                 Please view results for these tests on the individual orders.    Comprehensive Metabolic Panel [[id number]]  (Abnormal) Collected: 04/11/22 0908    Specimen: Blood Updated: 04/11/22 0950     Glucose 260 mg/dL      BUN 32 mg/dL      Creatinine 0.63 mg/dL      Sodium 138 mmol/L      Potassium 4.8 mmol/L      Chloride 103 mmol/L      CO2 26.0 mmol/L      Calcium 9.2 mg/dL      Total Protein 5.9 g/dL      Albumin 3.80 g/dL      ALT (SGPT) 14 U/L      AST (SGOT) 17 U/L      Alkaline Phosphatase 70 U/L      Total Bilirubin 0.6 mg/dL      Globulin 2.1 gm/dL      A/G Ratio 1.8 g/dL      BUN/Creatinine Ratio 50.8     Anion Gap 9.0 mmol/L      eGFR 79.8 mL/min/1.73      Comment: National Kidney Foundation and American Society of Nephrology (ASN) Task Force recommended calculation based on the Chronic Kidney Disease Epidemiology Collaboration (CKD-EPI) equation refit without adjustment for race.       Narrative:      GFR Normal >60  Chronic Kidney Disease <60  Kidney Failure <15      BNP [[id number]]  (Normal) Collected: 04/11/22 0908    Specimen: Blood Updated: 04/11/22 0945     proBNP 631.9 pg/mL     Narrative:      Among patients with dyspnea, NT-proBNP is highly sensitive for the detection of acute congestive heart failure. In addition NT-proBNP of <300 pg/ml effectively rules out acute congestive heart failure with 99% negative predictive value.    Results may be falsely decreased if patient taking Biotin.      CBC Auto Differential [[id number]]  (Abnormal) Collected: 04/11/22 0908    Specimen: Blood Updated: 04/11/22 0922     WBC 5.02 10*3/mm3      RBC 3.60 10*6/mm3      Hemoglobin 10.6 g/dL      Hematocrit 33.7 %      MCV 93.6 fL       MCH 29.4 pg      MCHC 31.5 g/dL      RDW 15.1 %      RDW-SD 52.3 fl      MPV 9.1 fL      Platelets 288 10*3/mm3      Neutrophil % 70.3 %      Lymphocyte % 20.9 %      Monocyte % 7.6 %      Eosinophil % 0.4 %      Basophil % 0.2 %      Immature Grans % 0.6 %      Neutrophils, Absolute 3.53 10*3/mm3      Lymphocytes, Absolute 1.05 10*3/mm3      Monocytes, Absolute 0.38 10*3/mm3      Eosinophils, Absolute 0.02 10*3/mm3      Basophils, Absolute 0.01 10*3/mm3      Immature Grans, Absolute 0.03 10*3/mm3      nRBC 0.0 /100 WBC         Labs showed mild hyperglycemia and mild anemia that is chronic for the patient.  Normal BNP and renal function.  I suspect the patient's legs are swelling due to her history of congestive heart failure and venous insufficiency.  This has been chronic for several months.  I have little to no concern for DVT.  Patient states that in the past home health has come to her house and wrapped her legs and this helped the swelling tremendously.  Patient will call Dr. Costello today to see if home health can come back out to wrap her legs.  Advised her to elevate the legs.  Advised her to start her Bumex back.  She had good vital signs here in the ER and no indication for admission at this time.  Patient will be discharged home to follow-up with her PCP and cardiologist.  Return for any worsening swelling, shortness of breath, fever, pain or other concerns.  Patient agreeable.                                      MDM    Final diagnoses:   Leg swelling   Chronic anemia   Chronic congestive heart failure, unspecified heart failure type ([facility])       ED Disposition  ED Disposition     ED Disposition   Discharge    Condition   Stable    Comment   --             Florentin Costello MD  [address]  [address]  [place] [address]  [phone]    Schedule an appointment as soon as possible for a visit       Douglas Abdullahi MD  [address]  [address]  [place] [place] [facility] [address]  [phone]    Schedule an  appointment as soon as possible for a visit            Medication List      Changed    bumetanide 0.5 MG tablet  Commonly known as: BUMEX  Take 1 tablet by mouth Daily As Needed (as needed for increased shortness of breath, swelling and/or weight gain of 2 pounds or more).  What changed:   · how much to take  · when to take this             Shyanne Quinn MD  04/11/22 0431

## 2022-04-13 ENCOUNTER — HOME CARE VISIT (OUTPATIENT)
Dept: HOME HEALTH SERVICES | Facility: HOME HEALTHCARE | Age: 87
End: 2022-04-13

## 2022-04-13 VITALS
SYSTOLIC BLOOD PRESSURE: 108 MMHG | HEART RATE: 70 BPM | OXYGEN SATURATION: 97 % | DIASTOLIC BLOOD PRESSURE: 62 MMHG | TEMPERATURE: 97.6 F | RESPIRATION RATE: 18 BRPM

## 2022-04-13 PROCEDURE — G0300 HHS/HOSPICE OF LPN EA 15 MIN: HCPCS

## 2022-04-13 NOTE — HOME HEALTH
FOCUS OF CARE/SKILLED NEED:     TEACHING/INTERVENTIONS:     PROGRESS TOWARD GOALS:    PHYSICIAN CONTACT:     FALLS SINCE LAST VISIT?    MEDICATION CHANGES SINCE LAST VISIT?    PLAN FOR NEXT VISIT:

## 2022-04-20 ENCOUNTER — HOME CARE VISIT (OUTPATIENT)
Dept: HOME HEALTH SERVICES | Facility: CLINIC | Age: 87
End: 2022-04-20

## 2022-04-20 PROCEDURE — G0299 HHS/HOSPICE OF RN EA 15 MIN: HCPCS

## 2022-04-22 VITALS
HEART RATE: 79 BPM | DIASTOLIC BLOOD PRESSURE: 48 MMHG | RESPIRATION RATE: 18 BRPM | TEMPERATURE: 98.2 F | OXYGEN SATURATION: 98 % | SYSTOLIC BLOOD PRESSURE: 108 MMHG

## 2022-04-22 NOTE — HOME HEALTH
FOCUS OF CARE/SKILLED NEED: assessment of edema in lower extremities other CHF symptom exacerbation    TEACHING/INTERVENTIONS: UNNA boot application and desired outcomes. Edema educating, hydration, and nutrition.     PROGRESS TOWARD GOALS: Progressing, UNNA boots change. Small amount of drainage noted on both lower extremities.     PHYSICIAN CONTACT: none recommenced    INSURANCE CHANGES? none    FALLS SINCE LAST VISIT? denies any falls     MEDICATION CHANGES SINCE LAST VISIT? no changes reported     PLAN FOR NEXT VISIT: continue to monitor edema and draining, and and other SS of CHF

## 2022-04-25 ENCOUNTER — HOME CARE VISIT (OUTPATIENT)
Dept: HOME HEALTH SERVICES | Facility: CLINIC | Age: 87
End: 2022-04-25

## 2022-04-25 VITALS
SYSTOLIC BLOOD PRESSURE: 122 MMHG | DIASTOLIC BLOOD PRESSURE: 52 MMHG | HEART RATE: 66 BPM | TEMPERATURE: 97.9 F | OXYGEN SATURATION: 97 %

## 2022-04-25 PROCEDURE — G0299 HHS/HOSPICE OF RN EA 15 MIN: HCPCS

## 2022-04-25 NOTE — HOME HEALTH
FOCUSED CARE/SKILL NEED: UNNA BOOTS TO BLE, WTIH 3+ EDEMA. MD NOTIFIED WITH NO NEW ORDERS AT THIS TIME. PT AND CG VERBALIZE ELEVATION OF BLE AND USE OF DIURETICS.      PLAN FOR NEXT VISIT: RECERT

## 2022-05-02 ENCOUNTER — HOME CARE VISIT (OUTPATIENT)
Dept: HOME HEALTH SERVICES | Facility: CLINIC | Age: 87
End: 2022-05-02

## 2022-05-02 VITALS
DIASTOLIC BLOOD PRESSURE: 52 MMHG | SYSTOLIC BLOOD PRESSURE: 118 MMHG | HEART RATE: 73 BPM | OXYGEN SATURATION: 98 % | TEMPERATURE: 97.7 F

## 2022-05-02 PROCEDURE — G0299 HHS/HOSPICE OF RN EA 15 MIN: HCPCS

## 2022-05-02 NOTE — HOME HEALTH
60 Day Summary    Home Health need continues for: CHF MANAGEMENT; PT AND DAUGHTER NEEDING REINFORCEMENT OF CHF DISEASE; EXPLAINED SN WAS NOT ABLE TO JUST SEE PT WEEKLY TO ASSESS; EDUCATION REGARDING DW/ELEVATION OF BLE/WEARING COMPRESSION STOCKINGS/ S/S OF WHEN TO CONTACT MD. NO EDEMA TO BLE BELOW KNEES. LOWER BP REPORTED ON LOG. MD CONTACTED WITH NO ORDERS AT THIS TIME.    Primary diagnoses/co-morbidities/recent procedures in past 60 days that impact current episode: CHF    Current level of functional ability: UP WITH SBA AND WALKER    Homebound status and living arrangements: LIVES ALONE, CHILDREN ASSIST WITH CARE    Goals accomplished and/or measurable progress toward unmet goals in past 60 days: ELEVATION OF BLE, DW  Focus of care for next 60 days for each discipline ordered: MANAGEMENT OF CHF CONTINUED    Skin integrity/wound status: SWELLING TO THIGH OF BLE ONLY    Code status: not specified    Most recent fall risk: 8    Estimated date when home care services will end UNKNOWN

## 2022-05-09 ENCOUNTER — HOME CARE VISIT (OUTPATIENT)
Dept: HOME HEALTH SERVICES | Facility: CLINIC | Age: 87
End: 2022-05-09

## 2022-05-09 VITALS
DIASTOLIC BLOOD PRESSURE: 46 MMHG | HEART RATE: 67 BPM | OXYGEN SATURATION: 95 % | TEMPERATURE: 96.9 F | SYSTOLIC BLOOD PRESSURE: 112 MMHG

## 2022-05-09 PROCEDURE — G0299 HHS/HOSPICE OF RN EA 15 MIN: HCPCS

## 2022-05-09 NOTE — HOME HEALTH
FOCUSED CARE/SKILL NEED: ASSESS EDEMA OF BLE-PREVIOUS WEEK EDEMA TO ABOVE KNEES, THIS VISIT EDEMA ONLY BELOW KNEES. UNNA BOOTS APPLIED TO BLE. EDUCATION REINFORCED OF NEED FOR SOME FORM OF COMPRESSION-CONCERNS REGARDING EDEMA ABOVE KNEES WITH USE OF UNNA BOOTS. CG AND PT CONFUSION REGARDING COMPRESSION STOCKINGS WITH EDUCATION REINFORCED. DISCUSSION REGARDING MSW FOR COMMUNITY RESOURCES TO ASSIST WITH DON/DOFF COMPRESSION STOCKINGS. TC TO MD REGARDING MSW ORDER. PT AND CG UNDERSTANDING AND TEACHBACK OF ELEVATION AND DW, CONTINUE NEED FOR REINFORCED EDUCATION OF CHF/HEART HEALTHY DIET AND CHF.      PLAN FOR NEXT VISIT: ASSESS BLE. CONTINUE EDUCATION REGARDING CHF.

## 2022-05-13 ENCOUNTER — HOME CARE VISIT (OUTPATIENT)
Dept: HOME HEALTH SERVICES | Facility: CLINIC | Age: 87
End: 2022-05-13

## 2022-05-13 PROCEDURE — G0155 HHCP-SVS OF CSW,EA 15 MIN: HCPCS

## 2022-05-13 NOTE — HOME HEALTH
SKILLED SERVICES PROVIDED / MEDICAL   ASSESSMENT OF SOCIAL AND EMOTIONAL FACTORS  COUNSELING FOR LONG RANGE PLANNING AND DECISION MAKING  COMMUNITY RESOURCE PLANNING    HOME/SOCIAL ENVIRONMENT-Patient lives alone in her home with several family members living in houses around her. Her daughter was present during MSW visit. Patient seems to have good family support

## 2022-05-16 ENCOUNTER — HOME CARE VISIT (OUTPATIENT)
Dept: HOME HEALTH SERVICES | Facility: CLINIC | Age: 87
End: 2022-05-16

## 2022-05-16 VITALS
TEMPERATURE: 97.2 F | HEART RATE: 61 BPM | DIASTOLIC BLOOD PRESSURE: 50 MMHG | SYSTOLIC BLOOD PRESSURE: 128 MMHG | OXYGEN SATURATION: 98 %

## 2022-05-16 PROCEDURE — G0299 HHS/HOSPICE OF RN EA 15 MIN: HCPCS

## 2022-05-24 ENCOUNTER — HOME CARE VISIT (OUTPATIENT)
Dept: HOME HEALTH SERVICES | Facility: CLINIC | Age: 87
End: 2022-05-24

## 2022-05-24 VITALS
HEART RATE: 63 BPM | SYSTOLIC BLOOD PRESSURE: 104 MMHG | OXYGEN SATURATION: 99 % | DIASTOLIC BLOOD PRESSURE: 48 MMHG | TEMPERATURE: 97.9 F | RESPIRATION RATE: 16 BRPM

## 2022-05-24 VITALS
DIASTOLIC BLOOD PRESSURE: 48 MMHG | SYSTOLIC BLOOD PRESSURE: 104 MMHG | OXYGEN SATURATION: 99 % | TEMPERATURE: 97.9 F | HEART RATE: 63 BPM

## 2022-05-24 PROCEDURE — G0299 HHS/HOSPICE OF RN EA 15 MIN: HCPCS

## 2022-05-24 PROCEDURE — G0152 HHCP-SERV OF OT,EA 15 MIN: HCPCS

## 2022-05-24 NOTE — HOME HEALTH
FOCUSED CARE/SKILL NEED: PRE DC VISIT. NO EDEMA TO BLE. PT WEARING COMPRESSION STOCKINGS AND ELEVATION OF BLE. PT CONTINUES DW WITH LOG.      PLAN FOR NEXT VISIT: SN ORTIZ.

## 2022-05-25 NOTE — HOME HEALTH
OCCUPATIONAL THERAPY EVALUATION    REASON FOR REFERRAL-   Patient referred due to neck pain affecting LB dressing and donning of LULU hose  PRIMARY DIAGNOSIS-   hypertensive heart disease  SECONDARY DIAGNOSIS-  CHF, dementia, HTN  SURGICAL PROCEDURES-  none recently    PREVIOUS OCCUPATIONAL THERAPY- none recently  OXYGEN USE- none    CLINICAL FINDINGS:  WOUND / SKIN CONDITION- no issues  EDEMA-  slight B LE  PAIN-   4/10                      LOCATION-   neck and back                               CONTROLLED WITH-  heat and rest  MENTAL STATUS-   A/Ox3  COGNITION-  WNL    VISION-   wears glasses  HEARING LOSS-   [place]  HEAD CONTROL-  fair--pain and stiffness           TRUNK STRENGTH- fair-     EQUIPMENT : Patient has hospital bed that is not adjustable--no crank at bottom, walker, reacher, BSC by bed.           FUNCTIONAL ENDURANCE FOR SAFE ACTIVITIES OF DAILY LIVING / INSTRUMENTAL ACTIVITIES OF DAILY LIVING:  fair    BALANCE: see PT lucho for formal assessment  SITTING BALANCE- fair-  STANDING BALANCE- fair      WEIGHT BEARING STATUS/PRECAUTIONS-   WBAT  ASSISTIVE DEVICE-   walker    ACTIVITIES OF DAILY LIVING MOBILITY/FALLS RISK ASSESSMENT- at risk for falls due to weakness, impaired gait and balance, dependence on AD      MEDICAL NECESSITY- Skilled OT medically necessary to increase ROM in neck and shoulders and decrease pain in order to increase ADL independnce  PATIENT GOAL FOR THIS EPISODE OF CARE- to be able to have less pain in neck    TODAY'S INTERVENTIONS-   Initiall eval completed. Goals and POC discussed with patient  and ADL training initiated. GENle ROM and massage to neck and shoulders. Patient has limited ROM and increased pain and when she tries to lean forward her neck hurts. Patient reports physician was going to order x ray for her neck, but has not yet done so.    REHAB POTENTIAL-   good for stated goals    DATE OF NEXT APPOINTMENT WITH DOCTOR- pending  PATIENT / CAREGIVER AGREE WITH DISCHARGE PLAN-  yes  COMMUNICATION / CARE COORDINATION- staff re: JOB yepez

## 2022-05-27 ENCOUNTER — CLINICAL SUPPORT NO REQUIREMENTS (OUTPATIENT)
Dept: CARDIOLOGY | Facility: CLINIC | Age: 87
End: 2022-05-27

## 2022-05-27 DIAGNOSIS — I49.5 SSS (SICK SINUS SYNDROME): Chronic | ICD-10-CM

## 2022-05-27 PROCEDURE — 93288 INTERROG EVL PM/LDLS PM IP: CPT | Performed by: INTERNAL MEDICINE

## 2022-05-31 ENCOUNTER — HOME CARE VISIT (OUTPATIENT)
Dept: HOME HEALTH SERVICES | Facility: CLINIC | Age: 87
End: 2022-05-31

## 2022-05-31 VITALS
SYSTOLIC BLOOD PRESSURE: 104 MMHG | TEMPERATURE: 97.3 F | OXYGEN SATURATION: 97 % | HEART RATE: 70 BPM | DIASTOLIC BLOOD PRESSURE: 48 MMHG

## 2022-05-31 PROCEDURE — G0299 HHS/HOSPICE OF RN EA 15 MIN: HCPCS

## 2022-05-31 PROCEDURE — G0152 HHCP-SERV OF OT,EA 15 MIN: HCPCS

## 2022-05-31 NOTE — HOME HEALTH
THE FOLLOWING INSTRUCTIONS WERE REVIEWED UPON DISCHARGE:    Keep your appointment with CARDIOLOGIST on JUNE 9TH, 2022.    Call your doctor if you develop a new or worsening symptom, especially if you develop SOA, CP, TEMPERATURE GREATER THAN 100.4 DEGREES, WT GN 2-3 LBS OVERNIGHT OR 5 LBS IN A WEEKS.    Continue to take the medications prescribed by your doctor. A medication list is attached. Be sure to keep them informed of all medications you take including over the counter herbal, vitamins/minerals and the ones you take only when needed.    Follow the REGUALR, HEART HEALTHY diet as ordered by your doctor.     Continue with home program as instructed by therapist (handouts given).    Continue wound care as ordered. NO WOUND CARE    Community resource referrals (list provided).     Other notes/instructions: CONTINUE DAILY WEIGHTS, DAILY LOG    Instructions given to Patient and Caregiver    Instructions provided per Visit

## 2022-06-01 VITALS
DIASTOLIC BLOOD PRESSURE: 48 MMHG | SYSTOLIC BLOOD PRESSURE: 104 MMHG | TEMPERATURE: 97.3 F | OXYGEN SATURATION: 97 % | RESPIRATION RATE: 18 BRPM | HEART RATE: 70 BPM

## 2022-06-02 ENCOUNTER — HOME CARE VISIT (OUTPATIENT)
Dept: HOME HEALTH SERVICES | Facility: CLINIC | Age: 87
End: 2022-06-02

## 2022-06-02 VITALS
RESPIRATION RATE: 16 BRPM | OXYGEN SATURATION: 98 % | DIASTOLIC BLOOD PRESSURE: 56 MMHG | SYSTOLIC BLOOD PRESSURE: 124 MMHG | HEART RATE: 72 BPM | TEMPERATURE: 98.4 F

## 2022-06-02 PROCEDURE — G0152 HHCP-SERV OF OT,EA 15 MIN: HCPCS

## 2022-06-02 NOTE — HOME HEALTH
Subjective: Patient here with daughter. Patient has a padded neck cushion around her neck. (Releaf neck rest). Patient states her son bought it for her, but she thinks its making her neck stiffness worse.    Falls:none    Medication changes:none    Insurance changes:none    Edema:none    Medical necessity for continued visits: Skilled OT medically necessary to address neck and shoulder pain and flexibility to aide in increasing ADL independence.    Next MD appt:pending    Plan for next visit: Address cervical ROM flexibility and pain.

## 2022-06-02 NOTE — HOME HEALTH
Subjective: Patient states she is having  trouble getting comfortable in her bed.    Falls: none reported    Medication changes: none reported    Insurance changes: none reported    Edema: none noted     Medical necessity for continued visits Skilled OT is recommended to continue in order to increase strength for ADL participation.    Next MD appt:6/9/22-- Dr. Jefferson PA    Plan for next visit: Plan to continue with UB strengthening and ROM.

## 2022-06-06 ENCOUNTER — HOME CARE VISIT (OUTPATIENT)
Dept: HOME HEALTH SERVICES | Facility: CLINIC | Age: 87
End: 2022-06-06

## 2022-06-06 VITALS
RESPIRATION RATE: 18 BRPM | TEMPERATURE: 98.4 F | SYSTOLIC BLOOD PRESSURE: 102 MMHG | HEART RATE: 66 BPM | DIASTOLIC BLOOD PRESSURE: 42 MMHG | OXYGEN SATURATION: 99 %

## 2022-06-06 PROCEDURE — G0152 HHCP-SERV OF OT,EA 15 MIN: HCPCS

## 2022-06-08 NOTE — HOME HEALTH
Subjective: Patient states she got up late today    Falls: none    Medication changes:none    Insurance changes:none    Edema:none--wearing compression hose    Medical necessity for continued visits: Skilled OT medically necessary to aide in increasing ROM and decreasing pain in B UE    Next MD appt: Thursday morning    Plan for next visit: Address HEP, shower skills

## 2022-06-09 ENCOUNTER — OFFICE VISIT (OUTPATIENT)
Dept: CARDIOLOGY | Facility: CLINIC | Age: 87
End: 2022-06-09

## 2022-06-09 VITALS
WEIGHT: 126 LBS | HEART RATE: 64 BPM | OXYGEN SATURATION: 99 % | SYSTOLIC BLOOD PRESSURE: 120 MMHG | BODY MASS INDEX: 22.32 KG/M2 | HEIGHT: 63 IN | DIASTOLIC BLOOD PRESSURE: 62 MMHG

## 2022-06-09 DIAGNOSIS — Z95.5 PRESENCE OF DRUG COATED STENT IN RIGHT CORONARY ARTERY: Chronic | ICD-10-CM

## 2022-06-09 DIAGNOSIS — I50.32 CHRONIC DIASTOLIC HEART FAILURE: Chronic | ICD-10-CM

## 2022-06-09 DIAGNOSIS — Z95.0 PRESENCE OF CARDIAC PACEMAKER: Chronic | ICD-10-CM

## 2022-06-09 DIAGNOSIS — K21.9 GASTROESOPHAGEAL REFLUX DISEASE, UNSPECIFIED WHETHER ESOPHAGITIS PRESENT: Primary | ICD-10-CM

## 2022-06-09 DIAGNOSIS — I25.10 CORONARY ARTERY DISEASE INVOLVING NATIVE CORONARY ARTERY OF NATIVE HEART WITHOUT ANGINA PECTORIS: Chronic | ICD-10-CM

## 2022-06-09 DIAGNOSIS — I49.5 SSS (SICK SINUS SYNDROME): Chronic | ICD-10-CM

## 2022-06-09 DIAGNOSIS — I10 PRIMARY HYPERTENSION: Chronic | ICD-10-CM

## 2022-06-09 DIAGNOSIS — I47.29 NSVT (NONSUSTAINED VENTRICULAR TACHYCARDIA): Chronic | ICD-10-CM

## 2022-06-09 PROCEDURE — 99214 OFFICE O/P EST MOD 30 MIN: CPT | Performed by: INTERNAL MEDICINE

## 2022-06-09 NOTE — PROGRESS NOTES
Referring Provider: Florentin Costello MD    Reason for Follow-up Visit:Here for routine follow up   coronary artery disease   stented coronary artery   sick sinus syndrome s/p pacemaker   Cardiac workup test results as below: echo, stress test    non sustained ventricular tachycardia that is brief on device check in past   Massive pedal edema   Last visit went to ER and discharged after IV diuretics     Subjective    Overall better   Leg swelling much better   Some chest wall pain   Declined to go to ER   Here with her 81 year old daughter     No palpitation  Decreased effort tolerance    No presyncope or syncope  Compliant with medications      Easy fatiguability     Joint pain in small, medium and large joints   Chronic low back pain      BP well controlled at home.     No bleeding, excessive bruising, gait instability or fall risks       Device interrogations show no new incriminating arrhythmia           History of present illness:  Emiliano Recinos is a 89 y.o. yo female with coronary artery disease stented coronary artery who presents today for   Chief Complaint   Patient presents with   • Congestive Heart Failure     4 MON FU    • Chest Pain   .    History  Past Medical History:   Diagnosis Date   • Chronic diastolic heart failure ([facility]) 12/15/2021   • Coronary artery disease involving native coronary artery of native heart without angina pectoris 9/6/2016   • Diabetes mellitus type 2, uncomplicated ([facility])    • GERD (gastroesophageal reflux disease)    • Hard of hearing    • History of myocardial infarction 03/29/2011    EF 45% 2/11 cath   • Hyperlipidemia LDL goal <70 9/6/2016   • NSVT (nonsustained ventricular tachycardia) ([facility]) 9/13/2018   • Presence of cardiac pacemaker 1/28/2022   • Presence of drug coated stent in right coronary artery 7/23/2018    3.0 x 18 mm Promus drug-eluting stent to the right coronary artery on 3/29/2011 for myocardial infarction   • Primary hypertension 1/28/2022   • SSS (sick  sinus syndrome) ([facility])    • Syncope    ,   Past Surgical History:   Procedure Laterality Date   • BLADDER SUSPENSION      with hysterectomy   • CARDIAC CATHETERIZATION     • CORONARY STENT PLACEMENT      x 1   • HYSTERECTOMY      total   • INSERT / REPLACE / REMOVE PACEMAKER     • PACEMAKER IMPLANTATION     ,   Family History   Problem Relation Age of Onset   • Coronary artery disease Mother    • Heart attack Mother    ,   Social History     Tobacco Use   • Smoking status: Never Smoker   • Smokeless tobacco: Never Used   Vaping Use   • Vaping Use: Never used   Substance Use Topics   • Alcohol use: No   • Drug use: No   ,     Medications  Current Outpatient Medications   Medication Sig Dispense Refill   • aluminum-magnesium hydroxide-simethicone (MAALOX/MYLANTA) 200-200-20 MG/5ML suspension Take 10 mL by mouth As Needed for Indigestion.     • aspirin 81 MG chewable tablet Chew 81 mg Daily.     • bumetanide (BUMEX) 0.5 MG tablet Take 1 tablet by mouth Daily As Needed (as needed for increased shortness of breath, swelling and/or weight gain of 2 pounds or more). (Patient taking differently: Take 1 mg by mouth 2 (Two) Times a Day.) 90 tablet 3   • carvedilol (COREG) 3.125 MG tablet Take 3.125 mg by mouth 2 (Two) Times a Day With Meals.     • clopidogrel (PLAVIX) 75 MG tablet Take 75 mg by mouth daily.     • glimepiride (AMARYL) 1 MG tablet Take 1 mg by mouth 2 (Two) Times a Day.     • HYDROcodone-acetaminophen (NORCO) 5-325 MG per tablet Take 1 tablet by mouth 2 (Two) Times a Day As Needed.     • nitroglycerin (NITROSTAT) 0.4 MG SL tablet Place 0.4 mg under the tongue daily.     • omeprazole (PriLOSEC) 20 MG capsule Take 20 mg by mouth daily.     • Polyethylene Glycol 3350 (MIRALAX PO) Take 17 g by mouth Daily As Needed.     • sacubitril-valsartan (Entresto) 24-26 MG tablet Take 1 tablet by mouth 2 (Two) Times a Day. 60 tablet 11   • sacubitril-valsartan (Entresto) 24-26 MG tablet Take 1 tablet by mouth 2 (Two) Times a  "Day.       No current facility-administered medications for this visit.       Allergies:  Metoclopramide    Review of Systems  Review of Systems   Constitutional: Positive for malaise/fatigue.   HENT: Negative.    Eyes: Negative.    Cardiovascular: Positive for leg swelling. Negative for chest pain, claudication, cyanosis, dyspnea on exertion, irregular heartbeat, near-syncope, orthopnea, palpitations, paroxysmal nocturnal dyspnea and syncope.   Respiratory: Negative.    Endocrine: Negative.    Hematologic/Lymphatic: Negative.    Skin: Negative.    Musculoskeletal: Positive for arthritis and joint pain.   Gastrointestinal: Negative for anorexia.   Genitourinary: Negative.    Neurological: Positive for weakness.   Psychiatric/Behavioral: Negative.       Objective     Physical Exam:  /62   Pulse 64   Ht 160 cm (62.99\")   Wt 57.2 kg (126 lb)   SpO2 99%   BMI 22.33 kg/m²   Physical Exam   Constitutional: She appears well-developed.   HENT:   Head: Normocephalic.   Neck: Normal carotid pulses and no JVD present. No tracheal tenderness present. Carotid bruit is not present. No tracheal deviation present.   Cardiovascular: Regular rhythm, normal heart sounds and normal pulses.   Pulmonary/Chest: Effort normal. No stridor.   Abdominal: Soft. She exhibits no distension. There is no abdominal tenderness.   Musculoskeletal:      Right lower le+ Pitting Edema present.      Left lower le+ Pitting Edema present.   Neurological: She is alert. No cranial nerve deficit or sensory deficit.   Skin: Skin is warm.   Psychiatric: Her speech is normal and behavior is normal.       Results Review:      Results for orders placed during the hospital encounter of 12/15/21    Adult Transthoracic Echo Complete W/ Cont if Necessary Per Protocol    Interpretation Summary  · Left ventricular systolic function is normal. Left ventricular ejection fraction appears to be 61 - 65%.  · Left ventricular wall thickness is consistent " with mild concentric hypertrophy.  · Normal right ventricular cavity size and systolic function noted.  · No significant valvular abnormalities identified on this study.     US Venous Doppler Lower Extremity Bilateral (duplex) [[id number]] (Order [id number])  Order  Status: Final result           Study Notes       Tatum Coats on 12/15/2021  3:54 PM   BLE no thrombus visualized               Appointment Information      PACS Images     Radiology Images    Study Result    Narrative & Impression   History: Swelling     IMPRESSION:  Impression: There is no evidence of deep venous thrombosis or  superficial thrombophlebitis of right or left lower extremities.     Comments: Bilateral lower extremity venous duplex exam was performed  using color Doppler flow, Doppler waveform analysis, and grayscale  imaging, with and without compression. There is no evidence of deep  venous thrombosis in the common femoral, superficial femoral, popliteal,  peroneal, anterior tibial, and posterior tibial veins bilaterally. No  thrombus is identified in the saphenofemoral junctions and greater  saphenous veins bilaterally.           History: Swelling     IMPRESSION:  Impression: There is no evidence of deep venous thrombosis or  superficial thrombophlebitis of right or left lower extremities.     Comments: Bilateral lower extremity venous duplex exam was performed  using color Doppler flow, Doppler waveform analysis, and grayscale  imaging, with and without compression. There is no evidence of deep  venous thrombosis in the common femoral, superficial femoral, popliteal,  peroneal, anterior tibial, and posterior tibial veins bilaterally. No  thrombus is identified in the saphenofemoral junctions and greater  saphenous veins bilaterally.         This report was finalized on 11/19/2021 14:05 by Dr. Edwar Woods MD.    Results for orders placed during the hospital encounter of 12/15/21    Adult Transthoracic Echo Complete W/ Cont if  Necessary Per Protocol    Interpretation Summary  · Left ventricular systolic function is normal. Left ventricular ejection fraction appears to be 61 - 65%.  · Left ventricular wall thickness is consistent with mild concentric hypertrophy.  · Normal right ventricular cavity size and systolic function noted.  · No significant valvular abnormalities identified on this study.  myocardial perfusion scan  3/17    · Myocardial perfusion imaging indicates a normal myocardial perfusion study with no evidence of ischemia.  · Impressions are consistent with a low risk study.  · Left ventricular ejection fraction is hyperdynamic (Calculated EF > 70%).  · Raw images reviewed with the following abnormalities noted: scanned with arms down.        Procedures  ____________________________________________________________________________________________________________________________________________  Health maintenance and recommendations  Similar recommendations as last visit     Offered to give patient  a copy      Questions were encouraged, asked and answered to the patient's  understanding and satisfaction. Questions if any regarding current medications and side effects, need for refills and importance of compliance to medications stressed.    Reviewed available prior notes, consults, prior visits, laboratory findings, radiology and cardiology relevant reports. Updated chart as applicable. I have reviewed the patient's medical history in detail and updated the computerized patient record as relevant.      Updated patient regarding any new or relevant abnormalities on review of records or any new findings on physical exam. Mentioned to patient about purpose of visit and desirable health short and long term goals and objectives.    Primary to monitor CBC CMP Lipid panel and TSH as  applicable    ___________________________________________________________________________________________________________________________________________      Diagnoses and all orders for this visit:    1. Gastroesophageal reflux disease, unspecified whether esophagitis present (Primary)    2. Coronary artery disease involving native coronary artery of native heart without angina pectoris    3. NSVT (nonsustained ventricular tachycardia) ([facility])    4. Presence of cardiac pacemaker    5. Presence of drug coated stent in right coronary artery    6. Primary hypertension    7. SSS (sick sinus syndrome) ([facility])    8. Chronic diastolic heart failure ([facility])         Plan      Overall doing well no new cardiovascular symptoms and therefore no additional cardiac testing is required prior to next visit  If any interim issues arise will call me for further evaluation.     Patient expressed understanding  Encouraged and answered all questions   Discussed with the patient and all questioned fully answered. She will call me if any problems arise.   Discussed results of prior testing with patient : echo and prior negative DVT study   as well electrocardiogram from today       Strict low sodium diet     I support the patient's decision to take the Covid -19 vaccine   Had required complete course   No major issues   Now fully immunized    Had booster too      Keep LDL below 70 mg/dl. Monitor liver and renal functions.   Monitor CBC, CMP, TSH (as indicated) and Lipid Panel by primary     S/L NTG PRN for chest pain, call me or go to ER if has to use S/L nitroglycerin     Check BP and heart rates twice daily initially till blood pressures and heart rates under good control and then at least 3x / week,   If blood pressures continue to be well-controlled then can check week a month  at home and bring a recording for review next visit  If BP >130/85 or < 100/60 persistently over 3 reading 30 mins apart or if heart rates persistently above 100  bpm or less than 55 bpm call sooner for evaluation and advise     Monitor for any signs of bleeding including red or dark stools as well as easy bruisabilty. Fall precautions.   Monitor cardiac rhythm device function regularly per established schedule or PRN      Follow up with Lyn ZARATE , Deni ZARATE, Cherise Paula PA-C  or myself              Return in about 4 months (around 10/9/2022).

## 2022-06-10 ENCOUNTER — HOME CARE VISIT (OUTPATIENT)
Dept: HOME HEALTH SERVICES | Facility: CLINIC | Age: 87
End: 2022-06-10

## 2022-06-10 PROCEDURE — G0152 HHCP-SERV OF OT,EA 15 MIN: HCPCS

## 2022-06-13 ENCOUNTER — HOME CARE VISIT (OUTPATIENT)
Dept: HOME HEALTH SERVICES | Facility: CLINIC | Age: 87
End: 2022-06-13

## 2022-06-13 VITALS
TEMPERATURE: 98.5 F | SYSTOLIC BLOOD PRESSURE: 100 MMHG | DIASTOLIC BLOOD PRESSURE: 44 MMHG | HEART RATE: 78 BPM | OXYGEN SATURATION: 97 % | RESPIRATION RATE: 18 BRPM

## 2022-06-13 PROCEDURE — G0152 HHCP-SERV OF OT,EA 15 MIN: HCPCS

## 2022-06-13 NOTE — HOME HEALTH
Subjective: Patient states she doesnt think her neck and shoulders are feeling any better with the exercises. Patient states she believes the [place] patches feel better than anything.    Falls:none reported    Medication changes: none reported    Insurance changes:none reported    Edema: L ankle    Medical necessity for continued visits: Skilled OT medically necessary to address shower set up and continue UB HEP.    Next MD appt:pending    Plan for next visit: Shower set up,

## 2022-06-13 NOTE — HOME HEALTH
Subjective: Patient states she had a shower over the weekend. Daughter helped her in and she stood.     Falls: none reported    Medication changes:none reported    Insurance changes:none reported    Edema:none reported    Medical necessity for continued visits: Skilled OT medically necessary to address tub transfer and d/c next visit.    Next MD appt: 6/17/22    Plan for next visit:Plan to d/c and practice tub transfer.

## 2022-06-16 ENCOUNTER — HOME CARE VISIT (OUTPATIENT)
Dept: HOME HEALTH SERVICES | Facility: CLINIC | Age: 87
End: 2022-06-16

## 2022-06-16 VITALS
DIASTOLIC BLOOD PRESSURE: 44 MMHG | RESPIRATION RATE: 18 BRPM | OXYGEN SATURATION: 96 % | HEART RATE: 64 BPM | SYSTOLIC BLOOD PRESSURE: 98 MMHG | TEMPERATURE: 98.3 F

## 2022-06-16 PROCEDURE — G0152 HHCP-SERV OF OT,EA 15 MIN: HCPCS

## 2022-06-17 NOTE — HOME HEALTH
Subjective: Patient reports she is hoping the doctor will find out what is wrong with her neck. Patient states she has gotten minimal relief from OT.    Falls:none    Medication changes:none    Insurance changes:none     Edema: R LE--compression stockings on    Medical necessity for continued visits: d/c this date    Next MD appt: Dr. Costello 6/17/22

## 2022-06-20 NOTE — PROGRESS NOTES
I have reviewed the notes, assessments, and/or procedures performed by  Brianda Corona PA-C, I concur with her  documentation  of Emiliano Recinos.     Routing to pcp as fyi. Patient requested covid testing due to his symptoms.

## 2022-07-11 ENCOUNTER — DOCUMENTATION (OUTPATIENT)
Dept: CT IMAGING | Facility: HOSPITAL | Age: 87
End: 2022-07-11

## 2022-07-11 NOTE — PROGRESS NOTES
I spoke with patients daughter about incidental findings on previous imaging just to touch base with her and see if Dr. Costello had discussed any f/u imaging. She said that he had not. I told her I would deactivate any tracking on my end. She stated that they probably would not proceed with f/u due to patients age.

## 2022-07-24 ENCOUNTER — APPOINTMENT (OUTPATIENT)
Dept: CT IMAGING | Facility: HOSPITAL | Age: 87
End: 2022-07-24

## 2022-07-24 ENCOUNTER — HOSPITAL ENCOUNTER (EMERGENCY)
Facility: HOSPITAL | Age: 87
Discharge: HOME | End: 2022-07-24
Attending: EMERGENCY MEDICINE | Admitting: EMERGENCY MEDICINE

## 2022-07-24 VITALS
WEIGHT: 118 LBS | HEART RATE: 65 BPM | HEIGHT: 64 IN | DIASTOLIC BLOOD PRESSURE: 61 MMHG | SYSTOLIC BLOOD PRESSURE: 120 MMHG | BODY MASS INDEX: 20.14 KG/M2 | RESPIRATION RATE: 18 BRPM | TEMPERATURE: 97.7 F | OXYGEN SATURATION: 98 %

## 2022-07-24 DIAGNOSIS — M54.2 CHRONIC NECK PAIN: Primary | ICD-10-CM

## 2022-07-24 DIAGNOSIS — R60.0 LOWER EXTREMITY EDEMA: ICD-10-CM

## 2022-07-24 DIAGNOSIS — R73.9 HYPERGLYCEMIA: ICD-10-CM

## 2022-07-24 DIAGNOSIS — N28.9 MILD RENAL INSUFFICIENCY: ICD-10-CM

## 2022-07-24 DIAGNOSIS — G89.29 CHRONIC NECK PAIN: Primary | ICD-10-CM

## 2022-07-24 LAB
ALBUMIN SERPL-MCNC: 3.5 G/DL (ref 3.5–5.2)
ALBUMIN/GLOB SERPL: 1.5 G/DL
ALP SERPL-CCNC: 61 U/L (ref 39–117)
ALT SERPL W P-5'-P-CCNC: 14 U/L (ref 1–33)
ANION GAP SERPL CALCULATED.3IONS-SCNC: 8 MMOL/L (ref 5–15)
AST SERPL-CCNC: 15 U/L (ref 1–32)
BASOPHILS # BLD AUTO: 0.04 10*3/MM3 (ref 0–0.2)
BASOPHILS NFR BLD AUTO: 0.5 % (ref 0–1.5)
BILIRUB SERPL-MCNC: 0.3 MG/DL (ref 0–1.2)
BUN SERPL-MCNC: 51 MG/DL (ref 8–23)
BUN/CREAT SERPL: 45.5 (ref 7–25)
CALCIUM SPEC-SCNC: 8.8 MG/DL (ref 8.2–9.6)
CHLORIDE SERPL-SCNC: 98 MMOL/L (ref 98–107)
CO2 SERPL-SCNC: 29 MMOL/L (ref 22–29)
CREAT SERPL-MCNC: 1.12 MG/DL (ref 0.57–1)
CRP SERPL-MCNC: 0.3 MG/DL (ref 0–0.5)
DEPRECATED RDW RBC AUTO: 49.1 FL (ref 37–54)
EGFRCR SERPLBLD CKD-EPI 2021: 44.3 ML/MIN/1.73
EOSINOPHIL # BLD AUTO: 0.14 10*3/MM3 (ref 0–0.4)
EOSINOPHIL NFR BLD AUTO: 1.7 % (ref 0.3–6.2)
ERYTHROCYTE [DISTWIDTH] IN BLOOD BY AUTOMATED COUNT: 14.3 % (ref 12.3–15.4)
GLOBULIN UR ELPH-MCNC: 2.4 GM/DL
GLUCOSE SERPL-MCNC: 321 MG/DL (ref 65–99)
HCT VFR BLD AUTO: 34.8 % (ref 34–46.6)
HGB BLD-MCNC: 11.1 G/DL (ref 12–15.9)
IMM GRANULOCYTES # BLD AUTO: 0.21 10*3/MM3 (ref 0–0.05)
IMM GRANULOCYTES NFR BLD AUTO: 2.6 % (ref 0–0.5)
LYMPHOCYTES # BLD AUTO: 1.01 10*3/MM3 (ref 0.7–3.1)
LYMPHOCYTES NFR BLD AUTO: 12.3 % (ref 19.6–45.3)
MCH RBC QN AUTO: 29.8 PG (ref 26.6–33)
MCHC RBC AUTO-ENTMCNC: 31.9 G/DL (ref 31.5–35.7)
MCV RBC AUTO: 93.3 FL (ref 79–97)
MONOCYTES # BLD AUTO: 0.59 10*3/MM3 (ref 0.1–0.9)
MONOCYTES NFR BLD AUTO: 7.2 % (ref 5–12)
NEUTROPHILS NFR BLD AUTO: 6.23 10*3/MM3 (ref 1.7–7)
NEUTROPHILS NFR BLD AUTO: 75.7 % (ref 42.7–76)
NRBC BLD AUTO-RTO: 0 /100 WBC (ref 0–0.2)
PLATELET # BLD AUTO: 246 10*3/MM3 (ref 140–450)
PMV BLD AUTO: 9.1 FL (ref 6–12)
POTASSIUM SERPL-SCNC: 4.5 MMOL/L (ref 3.5–5.2)
PROT SERPL-MCNC: 5.9 G/DL (ref 6–8.5)
RBC # BLD AUTO: 3.73 10*6/MM3 (ref 3.77–5.28)
SODIUM SERPL-SCNC: 135 MMOL/L (ref 136–145)
WBC NRBC COR # BLD: 8.22 10*3/MM3 (ref 3.4–10.8)

## 2022-07-24 PROCEDURE — 80053 COMPREHEN METABOLIC PANEL: CPT | Performed by: EMERGENCY MEDICINE

## 2022-07-24 PROCEDURE — 36415 COLL VENOUS BLD VENIPUNCTURE: CPT

## 2022-07-24 PROCEDURE — 86140 C-REACTIVE PROTEIN: CPT | Performed by: EMERGENCY MEDICINE

## 2022-07-24 PROCEDURE — 85025 COMPLETE CBC W/AUTO DIFF WBC: CPT | Performed by: EMERGENCY MEDICINE

## 2022-07-24 PROCEDURE — 99283 EMERGENCY DEPT VISIT LOW MDM: CPT

## 2022-07-24 PROCEDURE — 72125 CT NECK SPINE W/O DYE: CPT

## 2022-07-24 PROCEDURE — 70450 CT HEAD/BRAIN W/O DYE: CPT

## 2022-07-24 NOTE — ED PROVIDER NOTES
Subjective   Patient with history of neck pain for 2 months no history of trauma no history of fall she lives by herself her elderly daughter takes care of her she is having neck pain and some headache and also numbness in the right first second and third finger no other focal neurological deficits  Constant urine no paresthesias able to walk around home with a walker.  Pain has been there is for the past 3 months      Neck Pain  Pain location:  Unable to specify  Quality:  Aching  Pain radiates to:  Does not radiate  Pain severity:  Severe  Pain is:  Same all the time  Onset quality:  Gradual  Timing:  Constant  Progression:  Unchanged  Chronicity:  New  Context: not fall, not jumping from heights, not lifting a heavy object, not MCA, not MVC, not pedestrian accident and not recent injury    Relieved by:  Nothing  Worsened by:  Position, bending and twisting  Ineffective treatments:  None tried  Associated symptoms: no bladder incontinence, no bowel incontinence, no chest pain, no fever, no headaches, no leg pain, no numbness, no paresis, no photophobia, no syncope, no tingling, no visual change, no weakness and no weight loss    Risk factors: no hx of head and neck radiation, no hx of osteoporosis, no recent head injury and no recurrent falls        Review of Systems   Constitutional: Negative.  Negative for fever and weight loss.   HENT: Negative.    Eyes: Negative.  Negative for photophobia.   Respiratory: Negative.    Cardiovascular: Negative.  Negative for chest pain and syncope.   Gastrointestinal: Negative.  Negative for bowel incontinence.   Genitourinary: Negative for bladder incontinence.   Musculoskeletal: Positive for neck pain. Negative for back pain.   Skin: Negative.    Neurological: Negative.  Negative for tingling, weakness, numbness and headaches.   All other systems reviewed and are negative.      Past Medical History:   Diagnosis Date   • Chronic diastolic heart failure ([facility]) 12/15/2021   •  Coronary artery disease involving native coronary artery of native heart without angina pectoris 9/6/2016   • Diabetes mellitus type 2, uncomplicated ([facility])    • GERD (gastroesophageal reflux disease)    • Hard of hearing    • History of myocardial infarction 03/29/2011    EF 45% 2/11 cath   • Hyperlipidemia LDL goal <70 9/6/2016   • NSVT (nonsustained ventricular tachycardia) ([facility]) 9/13/2018   • Presence of cardiac pacemaker 1/28/2022   • Presence of drug coated stent in right coronary artery 7/23/2018    3.0 x 18 mm Promus drug-eluting stent to the right coronary artery on 3/29/2011 for myocardial infarction   • Primary hypertension 1/28/2022   • SSS (sick sinus syndrome) ([facility])    • Syncope        Allergies   Allergen Reactions   • Metoclopramide Swelling     Legs swelled       Past Surgical History:   Procedure Laterality Date   • BLADDER SUSPENSION      with hysterectomy   • CARDIAC CATHETERIZATION     • CORONARY STENT PLACEMENT      x 1   • HYSTERECTOMY      total   • INSERT / REPLACE / REMOVE PACEMAKER     • PACEMAKER IMPLANTATION         Family History   Problem Relation Age of Onset   • Coronary artery disease Mother    • Heart attack Mother        Social History     Socioeconomic History   • Marital status:    Tobacco Use   • Smoking status: Never Smoker   • Smokeless tobacco: Never Used   Vaping Use   • Vaping Use: Never used   Substance and Sexual Activity   • Alcohol use: No   • Drug use: No   • Sexual activity: Defer           Objective   Physical Exam  Vitals and nursing note reviewed. Exam conducted with a chaperone present.   Constitutional:       General: She is awake.      Appearance: Normal appearance. She is well-developed. She is not ill-appearing.   HENT:      Head: Normocephalic and atraumatic.   Eyes:      General: Lids are normal.      Conjunctiva/sclera: Conjunctivae normal.      Pupils: Pupils are equal, round, and reactive to light.   Cardiovascular:      Rate and Rhythm: Normal  rate and regular rhythm.      Chest Wall: PMI is not displaced.      Pulses: Normal pulses.      Heart sounds: Normal heart sounds.   Pulmonary:      Effort: Pulmonary effort is normal.      Breath sounds: Normal breath sounds. No decreased breath sounds.   Abdominal:      General: Abdomen is flat. Bowel sounds are normal.      Palpations: Abdomen is soft.      Tenderness: There is no abdominal tenderness.   Musculoskeletal:         General: Normal range of motion.      Cervical back: Full passive range of motion without pain, normal range of motion and neck supple.      Comments: Patient came into the ED with complaints of neck pain musculoskeletal examination unremarkable no focal neurological deficits   Skin:     General: Skin is warm and dry.      Capillary Refill: Capillary refill takes less than 2 seconds.   Neurological:      General: No focal deficit present.      Mental Status: She is alert and oriented to person, place, and time.      Cranial Nerves: Cranial nerves are intact. No cranial nerve deficit or facial asymmetry.      Motor: Motor function is intact. No weakness.      Deep Tendon Reflexes: Reflexes are normal and symmetric.      Reflex Scores:       Bicep reflexes are 2+ on the right side and 2+ on the left side.       Patellar reflexes are 2+ on the right side and 2+ on the left side.     Comments: L-spine T-spine is negative for step-off laxity or tenderness.  Cervical spine is somewhat tender.   Psychiatric:         Behavior: Behavior is cooperative.         Procedures           ED Course  ED Course as of 07/24/22 1420   Sun Jul 24, 2022   1415 Patient has some lower extremity edema bilaterally pitting there is no evidence of any calf tenderness there is no cellulitis there is no evidence of any DVT.  Her neck CTs are negative.  CT of the head is negative.  Has got mild renal insufficiency.  And blood sugar is elevated evidence of DKA. [TS]   1415 I have discussed this at length with the  patient and her daughter at this age drastically doing something to decrease the lower extremity edema is not going to be helpful and beneficial for the patient.  She has some mild renal insufficiency probably caused by diabetic nephropathy blood sugar is elevated but can be controlled at home there is no evidence of DKA and there is no anion gap there is no metabolic acidosis and there is no vomiting.  I will place her on Voltaren gel for neck pain advised using LULU hoses for the legs and discharged home with a follow-up [TS]   1416 Risks and benefits of treatments given and alternative treatment options discussed with patient/family. I answered all the questions in simple, plain language, and there was voiced understanding and agreement with plan of care. There were no further questions. Differential diagnosis discussed. Patient/family was advised that the practice of medicine is not always an exact science, and sometimes tests, physical exam, or history may not show the underlying conditions with certainty. Additionally, the condition may change or show itself later after initial presentation. There was also expressed understanding and agreement with this limitation of emergency medicine practice. Patient/family was asked to return to ED if any problem or issues or if condition worsens or does not improved. Patient/family agreed to follow up with PCP/specialist as advised, or return to ED if unable to see a provider in a timely fashion for continued symptoms.  [TS]      ED Course User Index  [TS] Robinson Michelle MD                                           [facility]  Number of Diagnoses or Management Options  Diagnosis management comments: Patient with chronic neck pain with some cervical radiculopathy i.e. numbness in the right first second and third finger.  No other focal deficits associate with this.  No urinary incontinence       Amount and/or Complexity of Data Reviewed  Clinical lab tests: ordered and  reviewed  Tests in the radiology section of CPT®: ordered and reviewed  Tests in the medicine section of CPT®: reviewed and ordered    Risk of Complications, Morbidity, and/or Mortality  Presenting problems: moderate  Diagnostic procedures: moderate  Management options: moderate        Final diagnoses:   Chronic neck pain   Lower extremity edema   Mild renal insufficiency   Hyperglycemia       ED Disposition  ED Disposition     ED Disposition   Discharge    Condition   Stable    Comment   --             No follow-up provider specified.       Medication List      New Prescriptions    Diclofenac Sodium 1 % gel gel  Commonly known as: VOLTAREN  Apply 4 g topically to the appropriate area as directed 3 (Three) Times a Day for 7 days.        Changed    bumetanide 0.5 MG tablet  Commonly known as: BUMEX  Take 1 tablet by mouth Daily As Needed (as needed for increased shortness of breath, swelling and/or weight gain of 2 pounds or more).  What changed:   · how much to take  · when to take this           Where to Get Your Medications      These medications were sent to [place], [place] - [address] [place] [address] - [phone] [facility] [phone] [address] [address]    Phone: [phone]   · Diclofenac Sodium 1 % gel gel          Robinson Michelle MD  07/24/22 1131       Robinson Michelle MD  07/24/22 1420       Robinson Michelle MD  07/24/22 1425

## 2022-12-19 RX ORDER — CARVEDILOL 3.12 MG/1
TABLET ORAL
Qty: 180 TABLET | Refills: 0 | Status: SHIPPED | OUTPATIENT
Start: 2022-12-19
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
## 2025-06-12 NOTE — TELEPHONE ENCOUNTER
Faxed    Instructions: This plan will send the code FBSE to the PM system.  DO NOT or CHANGE the price. Price (Do Not Change): 0.00 Detail Level: Simple